# Patient Record
Sex: MALE | Race: WHITE | NOT HISPANIC OR LATINO | URBAN - METROPOLITAN AREA
[De-identification: names, ages, dates, MRNs, and addresses within clinical notes are randomized per-mention and may not be internally consistent; named-entity substitution may affect disease eponyms.]

---

## 2017-06-05 ENCOUNTER — OUTPATIENT (OUTPATIENT)
Dept: OUTPATIENT SERVICES | Facility: HOSPITAL | Age: 67
LOS: 1 days | Discharge: HOME | End: 2017-06-05

## 2017-06-28 DIAGNOSIS — R06.02 SHORTNESS OF BREATH: ICD-10-CM

## 2017-06-28 DIAGNOSIS — I25.10 ATHEROSCLEROTIC HEART DISEASE OF NATIVE CORONARY ARTERY WITHOUT ANGINA PECTORIS: ICD-10-CM

## 2017-07-19 ENCOUNTER — OUTPATIENT (OUTPATIENT)
Dept: OUTPATIENT SERVICES | Facility: HOSPITAL | Age: 67
LOS: 1 days | Discharge: HOME | End: 2017-07-19

## 2017-07-26 DIAGNOSIS — I25.10 ATHEROSCLEROTIC HEART DISEASE OF NATIVE CORONARY ARTERY WITHOUT ANGINA PECTORIS: ICD-10-CM

## 2017-07-26 DIAGNOSIS — R94.39 ABNORMAL RESULT OF OTHER CARDIOVASCULAR FUNCTION STUDY: ICD-10-CM

## 2017-07-26 DIAGNOSIS — I10 ESSENTIAL (PRIMARY) HYPERTENSION: ICD-10-CM

## 2019-04-15 ENCOUNTER — INPATIENT (INPATIENT)
Facility: HOSPITAL | Age: 69
LOS: 6 days | Discharge: HOME | End: 2019-04-22
Attending: SURGERY | Admitting: SURGERY
Payer: MEDICARE

## 2019-04-15 VITALS
RESPIRATION RATE: 20 BRPM | SYSTOLIC BLOOD PRESSURE: 96 MMHG | DIASTOLIC BLOOD PRESSURE: 53 MMHG | HEART RATE: 126 BPM | TEMPERATURE: 98 F | OXYGEN SATURATION: 98 %

## 2019-04-15 DIAGNOSIS — Z98.890 OTHER SPECIFIED POSTPROCEDURAL STATES: Chronic | ICD-10-CM

## 2019-04-15 LAB
ALBUMIN SERPL ELPH-MCNC: 4.5 G/DL — SIGNIFICANT CHANGE UP (ref 3.5–5.2)
ALP SERPL-CCNC: 95 U/L — SIGNIFICANT CHANGE UP (ref 30–115)
ALT FLD-CCNC: 12 U/L — SIGNIFICANT CHANGE UP (ref 0–41)
ANION GAP SERPL CALC-SCNC: 18 MMOL/L — HIGH (ref 7–14)
ANION GAP SERPL CALC-SCNC: 26 MMOL/L — HIGH (ref 7–14)
APTT BLD: 25.3 SEC — LOW (ref 27–39.2)
AST SERPL-CCNC: 13 U/L — SIGNIFICANT CHANGE UP (ref 0–41)
BASE EXCESS BLDV CALC-SCNC: 8.5 MMOL/L — HIGH (ref -2–2)
BASOPHILS # BLD AUTO: 0.01 K/UL — SIGNIFICANT CHANGE UP (ref 0–0.2)
BASOPHILS # BLD AUTO: 0.05 K/UL — SIGNIFICANT CHANGE UP (ref 0–0.2)
BASOPHILS NFR BLD AUTO: 0.1 % — SIGNIFICANT CHANGE UP (ref 0–1)
BASOPHILS NFR BLD AUTO: 0.2 % — SIGNIFICANT CHANGE UP (ref 0–1)
BILIRUB SERPL-MCNC: 0.9 MG/DL — SIGNIFICANT CHANGE UP (ref 0.2–1.2)
BLD GP AB SCN SERPL QL: SIGNIFICANT CHANGE UP
BUN SERPL-MCNC: 66 MG/DL — CRITICAL HIGH (ref 10–20)
BUN SERPL-MCNC: 71 MG/DL — CRITICAL HIGH (ref 10–20)
CA-I SERPL-SCNC: 1.03 MMOL/L — LOW (ref 1.12–1.3)
CALCIUM SERPL-MCNC: 8.2 MG/DL — LOW (ref 8.5–10.1)
CALCIUM SERPL-MCNC: 9.7 MG/DL — SIGNIFICANT CHANGE UP (ref 8.5–10.1)
CHLORIDE SERPL-SCNC: 83 MMOL/L — LOW (ref 98–110)
CHLORIDE SERPL-SCNC: 91 MMOL/L — LOW (ref 98–110)
CO2 SERPL-SCNC: 29 MMOL/L — SIGNIFICANT CHANGE UP (ref 17–32)
CO2 SERPL-SCNC: 29 MMOL/L — SIGNIFICANT CHANGE UP (ref 17–32)
CREAT SERPL-MCNC: 5.3 MG/DL — CRITICAL HIGH (ref 0.7–1.5)
CREAT SERPL-MCNC: 7 MG/DL — CRITICAL HIGH (ref 0.7–1.5)
EOSINOPHIL # BLD AUTO: 0.03 K/UL — SIGNIFICANT CHANGE UP (ref 0–0.7)
EOSINOPHIL # BLD AUTO: 0.07 K/UL — SIGNIFICANT CHANGE UP (ref 0–0.7)
EOSINOPHIL NFR BLD AUTO: 0.1 % — SIGNIFICANT CHANGE UP (ref 0–8)
EOSINOPHIL NFR BLD AUTO: 0.4 % — SIGNIFICANT CHANGE UP (ref 0–8)
GAS PNL BLDV: 137 MMOL/L — SIGNIFICANT CHANGE UP (ref 136–145)
GAS PNL BLDV: SIGNIFICANT CHANGE UP
GLUCOSE SERPL-MCNC: 109 MG/DL — HIGH (ref 70–99)
GLUCOSE SERPL-MCNC: 135 MG/DL — HIGH (ref 70–99)
HCO3 BLDV-SCNC: 34 MMOL/L — HIGH (ref 22–29)
HCT VFR BLD CALC: 43.3 % — SIGNIFICANT CHANGE UP (ref 42–52)
HCT VFR BLD CALC: 54.3 % — HIGH (ref 42–52)
HCT VFR BLDA CALC: 54.2 % — HIGH (ref 34–44)
HGB BLD CALC-MCNC: 17.7 G/DL — SIGNIFICANT CHANGE UP (ref 14–18)
HGB BLD-MCNC: 13.9 G/DL — LOW (ref 14–18)
HGB BLD-MCNC: 17.5 G/DL — SIGNIFICANT CHANGE UP (ref 14–18)
IMM GRANULOCYTES NFR BLD AUTO: 0.7 % — HIGH (ref 0.1–0.3)
IMM GRANULOCYTES NFR BLD AUTO: 0.8 % — HIGH (ref 0.1–0.3)
INR BLD: 1.09 RATIO — SIGNIFICANT CHANGE UP (ref 0.65–1.3)
LACTATE BLDV-MCNC: 2.5 MMOL/L — HIGH (ref 0.5–1.6)
LACTATE SERPL-SCNC: 2.8 MMOL/L — HIGH (ref 0.5–2.2)
LIDOCAIN IGE QN: 19 U/L — SIGNIFICANT CHANGE UP (ref 7–60)
LYMPHOCYTES # BLD AUTO: 1.3 K/UL — SIGNIFICANT CHANGE UP (ref 1.2–3.4)
LYMPHOCYTES # BLD AUTO: 1.55 K/UL — SIGNIFICANT CHANGE UP (ref 1.2–3.4)
LYMPHOCYTES # BLD AUTO: 5.7 % — LOW (ref 20.5–51.1)
LYMPHOCYTES # BLD AUTO: 8.9 % — LOW (ref 20.5–51.1)
MAGNESIUM SERPL-MCNC: 2.1 MG/DL — SIGNIFICANT CHANGE UP (ref 1.8–2.4)
MAGNESIUM SERPL-MCNC: 2.5 MG/DL — HIGH (ref 1.8–2.4)
MCHC RBC-ENTMCNC: 26.1 PG — LOW (ref 27–31)
MCHC RBC-ENTMCNC: 26.3 PG — LOW (ref 27–31)
MCHC RBC-ENTMCNC: 32.1 G/DL — SIGNIFICANT CHANGE UP (ref 32–37)
MCHC RBC-ENTMCNC: 32.2 G/DL — SIGNIFICANT CHANGE UP (ref 32–37)
MCV RBC AUTO: 81 FL — SIGNIFICANT CHANGE UP (ref 80–94)
MCV RBC AUTO: 82 FL — SIGNIFICANT CHANGE UP (ref 80–94)
MONOCYTES # BLD AUTO: 1.67 K/UL — HIGH (ref 0.1–0.6)
MONOCYTES # BLD AUTO: 1.94 K/UL — HIGH (ref 0.1–0.6)
MONOCYTES NFR BLD AUTO: 8.5 % — SIGNIFICANT CHANGE UP (ref 1.7–9.3)
MONOCYTES NFR BLD AUTO: 9.6 % — HIGH (ref 1.7–9.3)
NEUTROPHILS # BLD AUTO: 13.88 K/UL — HIGH (ref 1.4–6.5)
NEUTROPHILS # BLD AUTO: 19.32 K/UL — HIGH (ref 1.4–6.5)
NEUTROPHILS NFR BLD AUTO: 80.2 % — HIGH (ref 42.2–75.2)
NEUTROPHILS NFR BLD AUTO: 84.8 % — HIGH (ref 42.2–75.2)
NRBC # BLD: 0 /100 WBCS — SIGNIFICANT CHANGE UP (ref 0–0)
NRBC # BLD: 0 /100 WBCS — SIGNIFICANT CHANGE UP (ref 0–0)
PCO2 BLDV: 46 MMHG — SIGNIFICANT CHANGE UP (ref 41–51)
PH BLDV: 7.48 — HIGH (ref 7.26–7.43)
PHOSPHATE SERPL-MCNC: 6 MG/DL — HIGH (ref 2.1–4.9)
PLATELET # BLD AUTO: 196 K/UL — SIGNIFICANT CHANGE UP (ref 130–400)
PLATELET # BLD AUTO: 309 K/UL — SIGNIFICANT CHANGE UP (ref 130–400)
PO2 BLDV: 38 MMHG — SIGNIFICANT CHANGE UP (ref 20–40)
POTASSIUM BLDV-SCNC: 4.2 MMOL/L — SIGNIFICANT CHANGE UP (ref 3.3–5.6)
POTASSIUM SERPL-MCNC: 4.3 MMOL/L — SIGNIFICANT CHANGE UP (ref 3.5–5)
POTASSIUM SERPL-MCNC: 4.6 MMOL/L — SIGNIFICANT CHANGE UP (ref 3.5–5)
POTASSIUM SERPL-SCNC: 4.3 MMOL/L — SIGNIFICANT CHANGE UP (ref 3.5–5)
POTASSIUM SERPL-SCNC: 4.6 MMOL/L — SIGNIFICANT CHANGE UP (ref 3.5–5)
PROT SERPL-MCNC: 8.1 G/DL — HIGH (ref 6–8)
PROTHROM AB SERPL-ACNC: 12.5 SEC — SIGNIFICANT CHANGE UP (ref 9.95–12.87)
RBC # BLD: 5.28 M/UL — SIGNIFICANT CHANGE UP (ref 4.7–6.1)
RBC # BLD: 6.7 M/UL — HIGH (ref 4.7–6.1)
RBC # FLD: 15 % — HIGH (ref 11.5–14.5)
RBC # FLD: 16.4 % — HIGH (ref 11.5–14.5)
SAO2 % BLDV: 71 % — SIGNIFICANT CHANGE UP
SODIUM SERPL-SCNC: 138 MMOL/L — SIGNIFICANT CHANGE UP (ref 135–146)
SODIUM SERPL-SCNC: 138 MMOL/L — SIGNIFICANT CHANGE UP (ref 135–146)
TROPONIN T SERPL-MCNC: <0.01 NG/ML — SIGNIFICANT CHANGE UP
TYPE + AB SCN PNL BLD: SIGNIFICANT CHANGE UP
WBC # BLD: 17.32 K/UL — HIGH (ref 4.8–10.8)
WBC # BLD: 22.8 K/UL — HIGH (ref 4.8–10.8)
WBC # FLD AUTO: 17.32 K/UL — HIGH (ref 4.8–10.8)
WBC # FLD AUTO: 22.8 K/UL — HIGH (ref 4.8–10.8)

## 2019-04-15 PROCEDURE — 99291 CRITICAL CARE FIRST HOUR: CPT

## 2019-04-15 PROCEDURE — 71045 X-RAY EXAM CHEST 1 VIEW: CPT | Mod: 26,77

## 2019-04-15 PROCEDURE — 99285 EMERGENCY DEPT VISIT HI MDM: CPT | Mod: AI

## 2019-04-15 PROCEDURE — 74176 CT ABD & PELVIS W/O CONTRAST: CPT | Mod: 26

## 2019-04-15 PROCEDURE — 71045 X-RAY EXAM CHEST 1 VIEW: CPT | Mod: 26

## 2019-04-15 PROCEDURE — 93010 ELECTROCARDIOGRAM REPORT: CPT

## 2019-04-15 RX ORDER — KETOROLAC TROMETHAMINE 30 MG/ML
15 SYRINGE (ML) INJECTION EVERY 6 HOURS
Qty: 0 | Refills: 0 | Status: DISCONTINUED | OUTPATIENT
Start: 2019-04-15 | End: 2019-04-15

## 2019-04-15 RX ORDER — SODIUM CHLORIDE 9 MG/ML
500 INJECTION, SOLUTION INTRAVENOUS ONCE
Qty: 0 | Refills: 0 | Status: COMPLETED | OUTPATIENT
Start: 2019-04-15 | End: 2019-04-15

## 2019-04-15 RX ORDER — HYDROMORPHONE HYDROCHLORIDE 2 MG/ML
0.5 INJECTION INTRAMUSCULAR; INTRAVENOUS; SUBCUTANEOUS EVERY 6 HOURS
Qty: 0 | Refills: 0 | Status: DISCONTINUED | OUTPATIENT
Start: 2019-04-15 | End: 2019-04-16

## 2019-04-15 RX ORDER — SODIUM CHLORIDE 9 MG/ML
1000 INJECTION, SOLUTION INTRAVENOUS
Qty: 0 | Refills: 0 | Status: DISCONTINUED | OUTPATIENT
Start: 2019-04-15 | End: 2019-04-15

## 2019-04-15 RX ORDER — HEPARIN SODIUM 5000 [USP'U]/ML
5000 INJECTION INTRAVENOUS; SUBCUTANEOUS EVERY 8 HOURS
Qty: 0 | Refills: 0 | Status: DISCONTINUED | OUTPATIENT
Start: 2019-04-15 | End: 2019-04-19

## 2019-04-15 RX ORDER — SODIUM CHLORIDE 9 MG/ML
1000 INJECTION INTRAMUSCULAR; INTRAVENOUS; SUBCUTANEOUS
Qty: 0 | Refills: 0 | Status: DISCONTINUED | OUTPATIENT
Start: 2019-04-15 | End: 2019-04-15

## 2019-04-15 RX ORDER — MORPHINE SULFATE 50 MG/1
2 CAPSULE, EXTENDED RELEASE ORAL EVERY 4 HOURS
Qty: 0 | Refills: 0 | Status: DISCONTINUED | OUTPATIENT
Start: 2019-04-15 | End: 2019-04-15

## 2019-04-15 RX ORDER — SODIUM CHLORIDE 9 MG/ML
1000 INJECTION, SOLUTION INTRAVENOUS
Qty: 0 | Refills: 0 | Status: DISCONTINUED | OUTPATIENT
Start: 2019-04-15 | End: 2019-04-16

## 2019-04-15 RX ORDER — SODIUM CHLORIDE 9 MG/ML
1000 INJECTION, SOLUTION INTRAVENOUS ONCE
Qty: 0 | Refills: 0 | Status: COMPLETED | OUTPATIENT
Start: 2019-04-15 | End: 2019-04-15

## 2019-04-15 RX ORDER — CHLORHEXIDINE GLUCONATE 213 G/1000ML
1 SOLUTION TOPICAL
Qty: 0 | Refills: 0 | Status: DISCONTINUED | OUTPATIENT
Start: 2019-04-15 | End: 2019-04-19

## 2019-04-15 RX ORDER — IOHEXOL 300 MG/ML
30 INJECTION, SOLUTION INTRAVENOUS ONCE
Qty: 0 | Refills: 0 | Status: COMPLETED | OUTPATIENT
Start: 2019-04-15 | End: 2019-04-15

## 2019-04-15 RX ORDER — PANTOPRAZOLE SODIUM 20 MG/1
40 TABLET, DELAYED RELEASE ORAL DAILY
Qty: 0 | Refills: 0 | Status: DISCONTINUED | OUTPATIENT
Start: 2019-04-15 | End: 2019-04-19

## 2019-04-15 RX ORDER — SODIUM CHLORIDE 9 MG/ML
1000 INJECTION INTRAMUSCULAR; INTRAVENOUS; SUBCUTANEOUS ONCE
Qty: 0 | Refills: 0 | Status: COMPLETED | OUTPATIENT
Start: 2019-04-15 | End: 2019-04-15

## 2019-04-15 RX ORDER — CEFOTETAN DISODIUM 1 G
2 VIAL (EA) INJECTION ONCE
Qty: 0 | Refills: 0 | Status: COMPLETED | OUTPATIENT
Start: 2019-04-15 | End: 2019-04-15

## 2019-04-15 RX ADMIN — Medication 100 GRAM(S): at 19:07

## 2019-04-15 RX ADMIN — IOHEXOL 30 MILLILITER(S): 300 INJECTION, SOLUTION INTRAVENOUS at 14:05

## 2019-04-15 RX ADMIN — PANTOPRAZOLE SODIUM 40 MILLIGRAM(S): 20 TABLET, DELAYED RELEASE ORAL at 23:08

## 2019-04-15 RX ADMIN — SODIUM CHLORIDE 1000 MILLILITER(S): 9 INJECTION, SOLUTION INTRAVENOUS at 15:35

## 2019-04-15 RX ADMIN — SODIUM CHLORIDE 500 MILLILITER(S): 9 INJECTION, SOLUTION INTRAVENOUS at 18:12

## 2019-04-15 RX ADMIN — SODIUM CHLORIDE 1000 MILLILITER(S): 9 INJECTION, SOLUTION INTRAVENOUS at 22:21

## 2019-04-15 RX ADMIN — HEPARIN SODIUM 5000 UNIT(S): 5000 INJECTION INTRAVENOUS; SUBCUTANEOUS at 23:08

## 2019-04-15 RX ADMIN — SODIUM CHLORIDE 1000 MILLILITER(S): 9 INJECTION, SOLUTION INTRAVENOUS at 23:04

## 2019-04-15 RX ADMIN — SODIUM CHLORIDE 2000 MILLILITER(S): 9 INJECTION INTRAMUSCULAR; INTRAVENOUS; SUBCUTANEOUS at 13:10

## 2019-04-15 NOTE — H&P ADULT - HISTORY OF PRESENT ILLNESS
HPI:    68M PMH HTN, DLD, GERD, PSH panc Ca s/p whipple procedure 2010 presents to the ED c/o 5 DAYS OF n/v and abd pain. Pt states the abdominal pain is focal, located to the mid abdomen, and is mild. Nonradiating. Pt states that he maintained a liquid diet for the past 5 days but had green emesis throughout. Pt states that he has had this hernia since his surg in '10 and he can usually "move it and put it back in" and never reports it being "stuck" until now. Pt says he was passing gas until this AM.     Last BM 2 days ago. Last c-scope 3y ago, last CT scan in Nov 18, both normal. Pt describes intermittent episodes of hematochezia over the past few months which were worked up by [place] [facility] [place]; negative EGD, healing on capsule endoscopy, pt does not know exact location. Pt denies hematochezia and hematemesis at this time, along w/ fever, chills, chest pain. HPI:    68M PMH HTN, DLD, GERD, PSH panc Ca s/p whipple procedure 2010 presents to the ED c/o 5 DAYS OF n/v and abd pain. Pt states the abdominal pain is focal, located to the mid abdomen, and is mild. Nonradiating. Pt states that he maintained a liquid diet for the past 5 days but had green emesis throughout. Pt states that he has had this hernia since his surg in '10 and he can usually "move it and put it back in" and never reports it being "stuck" until now. Pt does, however, describe an episode of similar abdominal pain and "dry heaving" in Feb of this year.     Pt says he was passing gas until this AM. Last BM 2 days ago. Last c-scope 3y ago, last CT scan in Nov 18, both normal. Pt describes intermittent episodes of hematochezia over the past few months which were worked up by his physicians at [facility] [place]; negative EGD, healing on capsule endoscopy, pt does not know exact location. Pt denies hematochezia and hematemesis at this time, along w/ fever, chills, chest pain.

## 2019-04-15 NOTE — ED PROVIDER NOTE - OBJECTIVE STATEMENT
67 Y/O M HTN, DLD, GERD, PANCREATIC CA S/P WHIPPLE PROCEDURE 2010, C/O 5 DAYS OF N/V AND ABD PAIN. ABD PAIN IS LOWER ABD AND IS CONSTANT AND NONRADIATING. PT ONLY ABLE TO TOLERATE LIQUIDS X 4-5 DAYS. LAST BM 2 DAYS AGO WAS NORMAL. NO FEVER, CHILLS. NO HEMATEMESIS. PT WITH 2 SIMILAR EPISODES OVER THE PAST 3 MONTHS AND PT HAD AN EGD WITH FIRST EPISODE WHICH WAS REPORTEDLY NORMAL. NORMAL URINATION. NO BACK PAIN. NO CP, SOB, COUGH.

## 2019-04-15 NOTE — CONSULT NOTE ADULT - SUBJECTIVE AND OBJECTIVE BOX
EVELIO LLAMAS  [id number]  68y Male with pertinent pmhx/pshx of pancreatic cancer s/p whipple 2010 presents with cc of mid abd pain x5 days PTA. He also reports vomiting "green" for the past 5 days and a "bulge" in his mid abdomen since his whipple procedure which he is usually able to reduce on his own but has been unsuccessful lately.  Other pmhx as stated below     On arrival to ED:  - BP stable, + tachycardia  - Hernia reduced by Surgical resident  - Slight diffuse TTP on exam, - peritoneal signs  - NGT placed with copious billious op  -WBC 22, Cr 7 (no hx of CKD, baseline normal), venous lactate 2.5, afebrile  - CT abdomen: Moderate gastric distention and multiple dilated loops of small bowel   extending to the level of a moderate size paraumbilical hernia containing   a short segment of thickened small bowel, compatible with small bowel   obstruction    Patient given 4L of IVF with plans for OR tonight/vladimir    SICU consulted for close monitoring in the setting of SBO, new onset DAISY    Admit Date: 4/15  ICU Date: 4/15  OR Date: ?    No Known Allergies    PAST MEDICAL & SURGICAL HISTORY:  HLD (hyperlipidemia)  HTN (hypertension)  History of pancreatic surgery    Home Medications:    DVT PTX:  Hep SQ    GI PTX: PPI     ***Tubes/Lines/Drains  ***  Peripheral IV  Hathaway      REVIEW OF SYSTEMS    [x ] A ten-point review of systems was otherwise negative except as noted.  [ ] Due to altered mental status/intubation, subjective information were not able to be obtained from the patient. History was obtained, to the extent possible, from review of the chart and collateral sources of information.    Daily     Daily     Diet, NPO (04-15-19 @ 20:21)      CURRENT MEDS:  Neurologic Medications  morphine  - Injectable 2 milliGRAM(s) IV Push every 4 hours PRN Moderate Pain (4 - 6)  Gastrointestinal Medications  lactated ringers. 1000 milliLiter(s) IV Continuous <Continuous>  pantoprazole  Injectable 40 milliGRAM(s) IV Push daily  Hematologic/Oncologic Medications  heparin  Injectable 5000 Unit(s) SubCutaneous every 8 hours  Topical/Other Medications  chlorhexidine 4% Liquid 1 Application(s) Topical <User Schedule>      PHYSICAL EXAM:    General/Neuro  Alert, oriented  Lungs:      clear to auscultation, Normal expansion/effort.     Cardiovascular : S1, S2. +tachycardia   Cardiac Rhythm: Normal Sinus Rhythm    GI: Abdomen soft, non distended, slight diffuse TTP, (-) peritoneal, no hernia appreciated on exam, well-healed midline laparotomy scar.    Extremities: Extremities warm, pink, well-perfused.     Derm: Good skin turgor, no skin breakdown.      :   Hathaway catheter in place.      CXR:     LABS:  CAPILLARY BLOOD GLUCOSE                              13.9   17.32 )-----------( 196      ( 15 Apr 2019 22:50 )             43.3       04-15    138  |  83<L>  |  66<HH>  ----------------------------<  135<H>  4.6   |  29  |  7.0<HH>    Ca    9.7      15 Apr 2019 13:10  Mg     2.5     04-15    TPro  8.1<H>  /  Alb  4.5  /  TBili  0.9  /  DBili  x   /  AST  13  /  ALT  12  /  AlkPhos  95  04-15      PT/INR - ( 15 Apr 2019 19:45 )   PT: 12.50 sec;   INR: 1.09 ratio         PTT - ( 15 Apr 2019 19:45 )  PTT:25.3 sec  CARDIAC MARKERS ( 15 Apr 2019 13:10 )  x     / <0.01 ng/mL / x     / x     / x EVELIO LLAMAS  [id number]  68y Male with pertinent pmhx/pshx of pancreatic cancer s/p whipple 2010 presents with cc of mid abd pain x5 days PTA. He also reports vomiting "green" for the past 5 days and a "bulge" in his mid abdomen since his whipple procedure which he is usually able to reduce on his own but has been unsuccessful lately. Last BM 2 days PTA ,passing gas.    Of note, patient also reports hx of previous GI bleed, but is a poor historian and is unable at providing details. Other pmhx as stated below     On arrival to ED:  - BP stable, + tachycardia  - Hernia reduced by Surgical resident  - Slight diffuse TTP on exam, - peritoneal signs  - NGT placed with copious billious op  -WBC 22, Cr 7 (no hx of CKD, baseline normal), venous lactate 2.5, afebrile  - CT abdomen: Moderate gastric distention and multiple dilated loops of small bowel   extending to the level of a moderate size paraumbilical hernia containing   a short segment of thickened small bowel, compatible with small bowel   obstruction    Patient given 4L of IVF with plans for OR tonight/vladimir    SICU consulted for close monitoring in the setting of SBO, new onset DAISY    Admit Date: 4/15  ICU Date: 4/15  OR Date: ?    No Known Allergies    PAST MEDICAL & SURGICAL HISTORY:  HLD (hyperlipidemia)  HTN (hypertension)  History of pancreatic surgery    Home Medications:    DVT PTX:  Hep SQ    GI PTX: PPI     ***Tubes/Lines/Drains  ***  Peripheral IV  Hathaway      REVIEW OF SYSTEMS    [x ] A ten-point review of systems was otherwise negative except as noted.  [ ] Due to altered mental status/intubation, subjective information were not able to be obtained from the patient. History was obtained, to the extent possible, from review of the chart and collateral sources of information.    Daily     Daily     Diet, NPO (04-15-19 @ 20:21)      CURRENT MEDS:  Neurologic Medications  morphine  - Injectable 2 milliGRAM(s) IV Push every 4 hours PRN Moderate Pain (4 - 6)  Gastrointestinal Medications  lactated ringers. 1000 milliLiter(s) IV Continuous <Continuous>  pantoprazole  Injectable 40 milliGRAM(s) IV Push daily  Hematologic/Oncologic Medications  heparin  Injectable 5000 Unit(s) SubCutaneous every 8 hours  Topical/Other Medications  chlorhexidine 4% Liquid 1 Application(s) Topical <User Schedule>      PHYSICAL EXAM:    General/Neuro  Alert, oriented  Lungs:      clear to auscultation, Normal expansion/effort.     Cardiovascular : S1, S2. +tachycardia   Cardiac Rhythm: Normal Sinus Rhythm    GI: Abdomen soft, non distended, slight diffuse TTP, (-) peritoneal, no hernia appreciated on exam, well-healed midline laparotomy scar.    Extremities: Extremities warm, pink, well-perfused.     Derm: Good skin turgor, no skin breakdown.      :   Hathaway catheter in place.      CXR:     LABS:  CAPILLARY BLOOD GLUCOSE                              13.9   17.32 )-----------( 196      ( 15 Apr 2019 22:50 )             43.3       04-15    138  |  83<L>  |  66<HH>  ----------------------------<  135<H>  4.6   |  29  |  7.0<HH>    Ca    9.7      15 Apr 2019 13:10  Mg     2.5     04-15    TPro  8.1<H>  /  Alb  4.5  /  TBili  0.9  /  DBili  x   /  AST  13  /  ALT  12  /  AlkPhos  95  04-15      PT/INR - ( 15 Apr 2019 19:45 )   PT: 12.50 sec;   INR: 1.09 ratio         PTT - ( 15 Apr 2019 19:45 )  PTT:25.3 sec  CARDIAC MARKERS ( 15 Apr 2019 13:10 )  x     / <0.01 ng/mL / x     / x     / x EVELIO LLAMAS  [id number]  68y Male with pertinent pmhx/pshx of pancreatic cancer s/p whipple 2010 presents with cc of mid abd pain x5 days PTA. He also reports vomiting "green" for the past 5 days and a "bulge" in his mid abdomen since his whipple procedure which he is usually able to reduce on his own but has been unsuccessful lately. Last BM 2 days PTA ,passing gas.    Of note, patient also reports hx of previous GI bleed, but is a poor historian and is unable at providing details. Other pmhx as stated below     On arrival to ED:  - BP stable, + tachycardia  - Hernia reduced by Surgical resident  - Slight diffuse TTP on exam, - peritoneal signs  - NGT placed with copious billious op  -WBC 22, Cr 7 (no hx of CKD, baseline normal), venous lactate 2.5, afebrile  - CT abdomen: Moderate gastric distention and multiple dilated loops of small bowel   extending to the level of a moderate size paraumbilical hernia containing   a short segment of thickened small bowel, compatible with small bowel   obstruction    Patient given 4L of IVF with plans for OR tonight/vladimir    SICU consulted for close monitoring in the setting of SBO likely 2/2 partially reducible hernia, new onset DAISY    Admit Date: 4/15  ICU Date: 4/15  OR Date: ?    No Known Allergies    PAST MEDICAL & SURGICAL HISTORY:  HLD (hyperlipidemia)  HTN (hypertension)  History of pancreatic surgery    Home Medications:    DVT PTX:  Hep SQ    GI PTX: PPI     ***Tubes/Lines/Drains  ***  Peripheral IV  Hathaway      REVIEW OF SYSTEMS    [x ] A ten-point review of systems was otherwise negative except as noted.  [ ] Due to altered mental status/intubation, subjective information were not able to be obtained from the patient. History was obtained, to the extent possible, from review of the chart and collateral sources of information.    Daily     Daily     Diet, NPO (04-15-19 @ 20:21)      CURRENT MEDS:  Neurologic Medications  morphine  - Injectable 2 milliGRAM(s) IV Push every 4 hours PRN Moderate Pain (4 - 6)  Gastrointestinal Medications  lactated ringers. 1000 milliLiter(s) IV Continuous <Continuous>  pantoprazole  Injectable 40 milliGRAM(s) IV Push daily  Hematologic/Oncologic Medications  heparin  Injectable 5000 Unit(s) SubCutaneous every 8 hours  Topical/Other Medications  chlorhexidine 4% Liquid 1 Application(s) Topical <User Schedule>      PHYSICAL EXAM:    General/Neuro  Alert, oriented  Lungs:      clear to auscultation, Normal expansion/effort.     Cardiovascular : S1, S2. +tachycardia   Cardiac Rhythm: Normal Sinus Rhythm    GI: Abdomen soft, non distended, slight diffuse TTP, (-) peritoneal, no hernia appreciated on exam, well-healed midline laparotomy scar.    Extremities: Extremities warm, pink, well-perfused.     Derm: Good skin turgor, no skin breakdown.      :   Hathaway catheter in place.      CXR:     LABS:  CAPILLARY BLOOD GLUCOSE                              13.9   17.32 )-----------( 196      ( 15 Apr 2019 22:50 )             43.3       04-15    138  |  83<L>  |  66<HH>  ----------------------------<  135<H>  4.6   |  29  |  7.0<HH>    Ca    9.7      15 Apr 2019 13:10  Mg     2.5     04-15    TPro  8.1<H>  /  Alb  4.5  /  TBili  0.9  /  DBili  x   /  AST  13  /  ALT  12  /  AlkPhos  95  04-15      PT/INR - ( 15 Apr 2019 19:45 )   PT: 12.50 sec;   INR: 1.09 ratio         PTT - ( 15 Apr 2019 19:45 )  PTT:25.3 sec  CARDIAC MARKERS ( 15 Apr 2019 13:10 )  x     / <0.01 ng/mL / x     / x     / x

## 2019-04-15 NOTE — ED PROVIDER NOTE - NS ED ROS FT
Constitutional:  See HPI.  Eyes:  No visual changes, eye pain or discharge.  ENMT:  No hearing changes, pain, discharge or infections. No neck pain or stiffness.  Cardiac:  No chest pain, SOB or edema. No chest pain with exertion.  Respiratory:  No cough or respiratory distress. No hemoptysis. No history of asthma or RAD.  GI:  see hpi  :  No dysuria, frequency or burning.  MS:  No myalgia, muscle weakness, joint pain or back pain.  Neuro:  No headache or weakness.  No LOC.  Skin:  No skin rash.   Endocrine: No history of thyroid disease or diabetes.  Except as documented in the HPI,  all other systems are negative.

## 2019-04-15 NOTE — ED PROVIDER NOTE - CLINICAL SUMMARY MEDICAL DECISION MAKING FREE TEXT BOX
67 Y/O M PMHX AS DOCUMENTED C/O 5 DAYS OF ABD PAIN, N/V. LABS WITH LEUKOCYTOSIS AND DAISY/DEHYDRATION. CT SCVAN WITH HERNIA/SBO. SURGERY AND NEPHROLOGY CONSULTED. IVFS GIVEN. ABX GIVEN. HUNT PLACED. PT ADMITTED TO SICU.

## 2019-04-15 NOTE — H&P ADULT - ASSESSMENT
Assessment:  68y Male patient admitted w/ incarcerated incisional hernia causing SBO    Plan:    NPO  IVF  ABX  NGT  Preop for OR  Please obtain Type & Screen, coags  DVT/GI ppx  OOBAT  IS  Pain control    Date/Time: 04-15-19 @ 19:14 Assessment:  68y Male patient admitted w/ incarcerated incisional hernia causing SBO    Plan:    Admit to SICU for optimization  NPO  IVF  ABX  NGT  Preop for OR  Please obtain Type & Screen, coags  DVT/GI ppx  OOBAT  IS  Pain control    Date/Time: 04-15-19 @ 19:14 Assessment:  68y Male patient admitted w/ incarcerated incisional hernia causing SBO    Plan:    Admit to SICU for optimization  NPO  IVF  ABX  NGT  Preop for OR  Please obtain Type & Screen, coags  DVT/GI ppx  OOBAT  IS  Pain control  Senior resident on call note : patient was seen and examined. Discussed the case with Dr. Gaspar and he agrees with the plan:  69 y/o m with SBO : secondary to partial recuabale w/o peritoneal sign inc ional hernia p/w abd pain/n/v for 4 days , wbc 21 , exam : reducible  hernia,passig gas , had bm 3 days ago .   LA : 2.5 , emi: cre 7 , bu 66.  - agree on above plan : - serial exam/ICU/ repeat labs q 4 hrs .   - plan for OR after optimizing  renal function or if exam changes.  - npo/ngt to LCS/Hathaway:strict is and os s  - scd/hsq/iv abx       Date/Time: 04-15-19 @ 19:14

## 2019-04-15 NOTE — ED ADULT NURSE REASSESSMENT NOTE - NS ED NURSE REASSESS COMMENT FT1
Pt reassessed A/O times 4 Vs retaken stable CT of abdomen order is done report filing slight better ED attending made aware on going nursing observation. .

## 2019-04-15 NOTE — CONSULT NOTE ADULT - SUBJECTIVE AND OBJECTIVE BOX
NEPHROLOGY CONSULTATION NOTE    Patient is a 69 Y/O M with pmh HTN, DLD, GERD, PANCREATIC CA S/P WHIPPLE PROCEDURE in 2010  presented to hospital with N/V and lower Abd Pain for 5 days. Abd pain is constant and non-radiating. Patient also mentions decreased oral intake, decreased urine output and few episodes of diarrhea and very little amount of blood in vomit. Pt denies any chest pain, SOB, fever, chills, dizziness. Pt had 2 similar episodes in last 2 months.    Patient takes valsartan and amlodipine for BP.      PAST MEDICAL & SURGICAL HISTORY:  HTN (hypertension)    Allergies:  No Known Allergies    Home Medications:    Hospital Medications:   MEDICATIONS  (STANDING):      SOCIAL HISTORY:  Denies ETOH,Smoking,   FAMILY HISTORY:        REVIEW OF SYSTEMS:    All other review of systems is negative unless indicated above.    VITALS:  T(F): 97.4 (04-15-19 @ 15:34), Max: 99.4 (04-15-19 @ 13:24)  HR: 97 (04-15-19 @ 15:34)  BP: 108/89 (04-15-19 @ 15:34)  RR: 20 (04-15-19 @ 15:34)  SpO2: 96% (04-15-19 @ 15:34)        I&O's Detail        PHYSICAL EXAM:  Constitutional: NAD  Respiratory: CTAB, no wheezes, rales or rhonchi  Cardiovascular: S1, S2, RRR  Gastrointestinal: BS+, soft, NT/ND  Extremities: No peripheral edema  Neurological: A/O x 3  : + spencer.     Vascular Access:    LABS:  04-15    138  |  83<L>  |  66<HH>  ----------------------------<  135<H>  4.6   |  29  |  7.0<HH>    Ca    9.7      15 Apr 2019 13:10  Mg     2.5     04-15    TPro  8.1<H>  /  Alb  4.5  /  TBili  0.9  /  DBili      /  AST  13  /  ALT  12  /  AlkPhos  95  04-15    Creatinine Trend: 7.0 <--                        17.5   22.80 )-----------( 309      ( 15 Apr 2019 13:10 )             54.3     Troponin T, Serum: <0.01 ng/mL (04.15.19 @ 13:10)    Blood Gas Venous - Lactate: 2.5 mmoL/L (04.15.19 @ 13:11)  Blood Gas Profile - Venous (04.15.19 @ 13:11)    pH, Venous: 7.48    pCO2, Venous: 46 mmHg    pO2, Venous: 38 mmHg    HCO3, Venous: 34 mmoL/L    Base Excess, Venous: 8.5 mmoL/L    Oxygen Saturation, Venous: 71 %      < from: 12 Lead ECG (04.15.19 @ 13:21) >  Diagnosis Line Sinus tachycardia with Premature atrial complexes  Possible Left atrial enlargement  Borderline ECG    < end of copied text >    Urine Studies:        RADIOLOGY & ADDITIONAL STUDIES:

## 2019-04-15 NOTE — CONSULT NOTE ADULT - ASSESSMENT
Patient with DAISY presented to hospital for n/v, abdominal pain, decreased oral intake, decreased uo.  PMH HTN, GERD, Pancreatic ca s/p whipple procedure    # DAISY (last serum creatinine = 1.06 noted in 2017)   - likely prerenal due to decreased oral intake plus valsartan   - No acute indication for RRT at the moment.   - BP noted on lower side, no need for anti-HTN meds for now   - VBG suggestive of high AG metabolic acidemia plus respiratory alkalosis plus metabolic alkalosis on delta/delta. but can not be completely certain with VBG   - hold valsartan for now, No ACE/ARBs   - cont. IVF with NS at 75 cc/hr   - Strict I/O   - check UA, urine creatinine, Na, urea   - check renal bladder sono   - repeat BMP, trend creatinine   - Avoid nephrotoxins and hypotension   - will follow

## 2019-04-15 NOTE — ED PROVIDER NOTE - CARE PLAN
Principal Discharge DX:	SBO (small bowel obstruction)  Secondary Diagnosis:	Incarcerated hernia Principal Discharge DX:	SBO (small bowel obstruction)  Secondary Diagnosis:	Incarcerated hernia  Secondary Diagnosis:	DAISY (acute kidney injury)  Secondary Diagnosis:	Dehydration  Secondary Diagnosis:	Vomiting

## 2019-04-15 NOTE — ED PROVIDER NOTE - PROGRESS NOTE DETAILS
NEPHROLOGY CONSULTED, SECOND LITER OF FLUID INFUSING. PT COMPLETED ORAL CONTRAST FOR CT SCAN. HUNT PLACED, 20 CC DARK URINE RETURNED. SECOND LITER IVFS COMPLETED. NEPHROLOGY FELLOW AT BEDSIDE. CT SCAN D/W RADIOLOGIST. SURGERY CONSULTED. ABX ORDERED. SURGICAL RESIDENT AT PTS BEDSIDE.

## 2019-04-15 NOTE — H&P ADULT - NSHPPHYSICALEXAM_GEN_ALL_CORE
Vitals: T(F): 97.4 (04-15-19 @ 15:34), Max: 99.4 (04-15-19 @ 13:24)  HR: 97 (04-15-19 @ 15:34)  BP: 108/89 (04-15-19 @ 15:34) (80/50 - 108/89)  RR: 20 (04-15-19 @ 15:34)  SpO2: 96% (04-15-19 @ 15:34)      Physical Examination:  General Appearance: NAD  HEENT: EOMI, sclera non-icteric.  Heart: RRR   Lungs: CTABL.   Abdomen:  Soft, nondistended. Pinpoint tenderness over palpable incarcerated bowel loop beneath well-healed midline laparotomy scar. Otherwise nontender.  MSK/Extremities: Warm & well-perfused.   Skin: Warm, dry. No jaundice.

## 2019-04-15 NOTE — CONSULT NOTE ADULT - ASSESSMENT
ASSESSMENT/PLAN: 68yMale with hx of panc ca s/p whipple in 2010 presents with SBO likely 2/2 to hernia     Neurologic:  Pain control:  -     Respiratory:  - No acute issues  - Nc, good oxygen sat  - IS/PT    Cardiovascular:  Hx of HTN:   - No meds on file but reports taking amlodipine, valsartan and a statin (need to do a med rec)  - BP currently soft do will not restart and meds at present    - No other CV issues    Gastrointestinal/Nutrition:  SBO:   - NGT in place  - Will replace op with 1/2 LR q4-6 hours  - Strict NPO  - Monitor abdominal exam  - PPI  - Possible OR tonight/vladimir    Ventral hernia?  - Patient states was unable to reduce for past couple days (may have caused SBO?)  - Currently reduced, monitor    Genitourinary/Renal:  DAISY:  - Cr 7 on arrival ,lactate 2.5 recieved 4L IVF in ED  - F/u repeat labs  - IVF  - Hathaway, strict I/Os     Hematologic:  - No acute issues  - Monitor H/H  - Hep Sq    Infectious Disease:  Leukocytosis:   - Monitor  - Currently no need for abx    Endocrine:  - No hx of DM  - FS q6h while NPO    Disposition: SICU ASSESSMENT/PLAN: 68yMale with hx of panc ca s/p whipple in 2010 presents with SBO likely 2/2 to hernia     Neurologic:  Pain control:  - Dilaudid prn      Respiratory:  - No acute issues  - Nc, good oxygen sat  - IS/PT    Cardiovascular:  Hx of HTN:   - No meds on file but reports taking amlodipine, valsartan and a statin (need to do a med rec)  - BP currently soft do will not restart and meds at present    - No other CV issues    Gastrointestinal/Nutrition:  SBO:   - NGT in place  - Will replace op with 1/2 LR q4-6 hours  - Strict NPO  - Monitor abdominal exam  - PPI  - Possible OR tonight/vladimir    Ventral hernia?  - Patient states was unable to reduce for past couple days (may have caused SBO?)  - Currently reduced, monitor    Genitourinary/Renal:  DAISY:  - Cr 7 on arrival ,lactate 2.5 recieved 4L IVF in ED  - F/u repeat labs  - IVF  - Hathaway, strict I/Os     Hematologic:  - No acute issues  - Monitor H/H  - Hep Sq    Infectious Disease:  Leukocytosis:   - Monitor  - Currently no need for abx    Endocrine:  - No hx of DM  - FS q6h while NPO    Disposition: SICU ASSESSMENT/PLAN: 68yMale with hx of panc ca s/p whipple in 2010 presents with SBO likely 2/2 to hernia     Neurologic:  Pain control:  - Dilaudid prn      Respiratory:  - No acute issues  - Nc, good oxygen sat  - IS/PT    Cardiovascular:  Hx of HTN:   - No meds on file but reports taking amlodipine, valsartan and a statin (need to do a med rec)  - BP currently soft do will not restart and meds at present    - No other CV issues    Gastrointestinal/Nutrition:  SBO:   - NGT in place  - Will replace op with 1/2 LR q4-6 hours  - Strict NPO  - Monitor abdominal exam  - PPI  - Possible OR tonight/vladimir    Ventral hernia?  - Patient states was unable to reduce for past couple days (may have caused SBO?)  - Currently reduced, monitor    Genitourinary/Renal:  DAISY:  - Cr 7 on arrival ,lactate 2.5 recieved 4L IVF in ED  - F/u repeat labs  - IVF  - Hathaway, strict I/Os     Hematologic:  - No acute issues  - Monitor H/H  - Hep Sq    Infectious Disease:  Leukocytosis:   - Monitor  - Cipro/Flagyl    Endocrine:  - No hx of DM  - FS q6h while NPO    Disposition: SICU ASSESSMENT/PLAN: 68yMale with hx of panc ca s/p whipple in 2010 presents with SBO likely 2/2 to hernia/incarcerated      Neurologic:  Pain control:  - Dilaudid prn      Respiratory:  - No acute issues  - Nc, good oxygen sat  - IS/PT    Cardiovascular:  Hx of HTN:   - No meds on file but reports taking amlodipine, valsartan and a statin (need to do a med rec)  - BP currently soft do will not restart and meds at present    - No other CV issues    Gastrointestinal/Nutrition:  SBO:   - NGT in place  - Will replace op with 1/2 LR q4-6 hours  - Strict NPO  - Monitor abdominal exam  - PPI  - Possible OR tonight/vladimir    Ventral hernia/incarcerated hernia?  - Patient states was unable to reduce for past couple days (may have caused SBO?)  - Currently reduced, monitor    Genitourinary/Renal:  DAISY:  - Cr 7 on arrival ,lactate 2.5 recieved 4L IVF in ED  - F/u repeat labs  - IVF  - Hathaway, strict I/Os     Hematologic:  - No acute issues  - Monitor H/H  - Hep Sq    Infectious Disease:  Leukocytosis:   - Monitor  - Cipro/Flagyl    Endocrine:  - No hx of DM  - FS q6h while NPO    Disposition: SICU ASSESSMENT/PLAN: 68yMale with hx of panc ca s/p whipple in 2010 presents with SBO likely 2/2 to hernia/incarcerated      Neurologic:  Pain control:  - Dilaudid prn      Respiratory:  - No acute issues  - Nc, good oxygen sat  - IS/PT    Cardiovascular:  Hx of HTN:   - No meds on file but reports taking amlodipine, valsartan and a statin (need to do a med rec)  - BP currently soft do will not restart and meds at present    - No other CV issues    Gastrointestinal/Nutrition:  SBO:   - NGT in place  - Will replace op with 1/2 LR q4-6 hours  - Strict NPO  - Monitor abdominal exam  - PPI  - Possible OR tonight/vladimir    Partially reducible ventral hernia  - Patient states was unable to reduce for past couple days (may have caused SBO?)  - Currently not appreciated on exam,    Genitourinary/Renal:  DAISY:  - Cr 7 on arrival ,lactate 2.5 recieved 4L IVF in ED  - F/u repeat labs  - IVF  - Hathaway, strict I/Os     Hematologic:  - No acute issues  - Monitor H/H  - Hep Sq    Infectious Disease:  Leukocytosis:   - Monitor  - Cipro/Flagyl    Endocrine:  - No hx of DM  - FS q6h while NPO    Disposition: SICU

## 2019-04-15 NOTE — H&P ADULT - NSCORESITESY/N_GEN_A_CORE_RD
HPI:  The patient returns for another Cervical epidural steroid injection today.  They have received >50% improvement since their last injection with a pain level of 5/10 at its worst recently.    Conservative measures tried in the interim     Exam:  There were no vitals taken for this visit.  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  Physical therapy exercises at home as prescribed by physical therapy or from the pain clinic handout (given to the patient).  Continuation of these exercises every day, or multiple times per week, even when the patient has good pain relief, was stressed to the patient as a preventative measure to decrease the frequency and severity of future pain episodes.  2.  Continue pain medicines as already prescribed.  If patient not currently taking any, it is recommended to begin Acetaminophen 1000 mg po q 8 hours.  If other medicines containing Acetaminophen are currently prescribed, maintain daily dose at 3000mg.    3.  If they can tolerate NSAIDS, it is recommended to take Ibuprofen 600 mg po q 6 hours for 7 days during pain exacerbations.  Alternatively, they may substitute an NSAID of their choice (e.g. Aleve)  4.  Heat and ice to the affected area as tolerated for pain control.  It was discussed that heating pads can cause burns.  5.  Low impact exercise such as walking or water exercise was recommended to maintain overall health and aid in weight control.   6.  Follow up as needed for subsequent injections.  7.  Patient was counseled to abstain from tobacco products.      
No

## 2019-04-15 NOTE — ED PROVIDER NOTE - PHYSICAL EXAMINATION
CONSTITUTIONAL: Well-appearing; well-nourished; in no apparent distress.   HEAD: Normocephalic; atraumatic.   EYES: PERRL; EOM intact. Conjunctiva normal B/L.   ENT: Normal pharynx with no tonsillar hypertrophy. + dry mucous membranes.  NECK: Supple; non-tender; no cervical lymphadenopathy.   CHEST: Normal chest excursion with respiration.   CARDIOVASCULAR: Tachycardia S1, S2; no murmurs, rubs, or gallops.   RESPIRATORY: Normal chest excursion with respiration; breath sounds clear and equal bilaterally; no wheezes, rhonchi, or rales.  GI/: Normal bowel sounds; + distended; non-tender.  BACK: No evidence of trauma or deformity. Non-tender to palpation. No CVA tenderness.   EXT: Normal ROM in all four extremities; non-tender to palpation; distal pulses are normal. No leg edema B/L.   SKIN: Normal for age and race; warm; dry; good turgor.  NEURO: A & O x 4; CN 2-12 intact. Grossly unremarkable.

## 2019-04-15 NOTE — H&P ADULT - NSHPLABSRESULTS_GEN_ALL_CORE
Labs:                        17.5   22.80 )-----------( 309      ( 15 Apr 2019 13:10 )             54.3     04-15    138  |  83<L>  |  66<HH>  ----------------------------<  135<H>  4.6   |  29  |  7.0<HH>    Ca    9.7      15 Apr 2019 13:10  Mg     2.5     04-15    TPro  8.1<H>  /  Alb  4.5  /  TBili  0.9  /  DBili  x   /  AST  13  /  ALT  12  /  AlkPhos  95  04-15    LIVER FUNCTIONS - ( 15 Apr 2019 13:10 )  Alb: 4.5 g/dL / Pro: 8.1 g/dL / ALK PHOS: 95 U/L / ALT: 12 U/L / AST: 13 U/L / GGT: x               CARDIAC MARKERS ( 15 Apr 2019 13:10 )  x     / <0.01 ng/mL / x     / x     / x          Imaging:     < from: CT Abdomen and Pelvis w/ Oral Cont (04.15.19 @ 17:38) >    1. Moderate gastric distention and multiple dilated loops of small bowel   extending to the level of a moderate size paraumbilical hernia containing   a short segment of thickenedsmall bowel, compatible with small bowel   obstruction; correlation for signs of incarceration is suggested.    2. Status post Whipple procedure with moderate pneumobilia, overall   unchanged since September 14, 2010.    < end of copied text >

## 2019-04-15 NOTE — H&P ADULT - ATTENDING COMMENTS
pt examined multiple times on 4/15  no abdominal pain or tenderness  reducible incisional hernia.   NG tube green bilious 800 ml  Hathaway dark urine     Pt with severe DAISY with likely a combination of dehydration and intrarenal  continue resuscitation.  Serial abdominal exam.   nephrology consult.

## 2019-04-15 NOTE — ED ADULT NURSE NOTE - NSIMPLEMENTINTERV_GEN_ALL_ED
Implemented All Fall with Harm Risk Interventions:  [place] to call system. Call bell, personal items and telephone within reach. Instruct patient to call for assistance. Room bathroom lighting operational. Non-slip footwear when patient is off stretcher. Physically safe environment: no spills, clutter or unnecessary equipment. Stretcher in lowest position, wheels locked, appropriate side rails in place. Provide visual cue, wrist band, yellow gown, etc. Monitor gait and stability. Monitor for mental status changes and reorient to person, place, and time. Review medications for side effects contributing to fall risk. Reinforce activity limits and safety measures with patient and family. Provide visual clues: red socks.

## 2019-04-16 LAB
ANION GAP SERPL CALC-SCNC: 13 MMOL/L — SIGNIFICANT CHANGE UP (ref 7–14)
ANION GAP SERPL CALC-SCNC: 14 MMOL/L — SIGNIFICANT CHANGE UP (ref 7–14)
BUN SERPL-MCNC: 56 MG/DL — HIGH (ref 10–20)
BUN SERPL-MCNC: 63 MG/DL — CRITICAL HIGH (ref 10–20)
CALCIUM SERPL-MCNC: 8.2 MG/DL — LOW (ref 8.5–10.1)
CALCIUM SERPL-MCNC: 8.3 MG/DL — LOW (ref 8.5–10.1)
CHLORIDE SERPL-SCNC: 96 MMOL/L — LOW (ref 98–110)
CHLORIDE SERPL-SCNC: 96 MMOL/L — LOW (ref 98–110)
CO2 SERPL-SCNC: 30 MMOL/L — SIGNIFICANT CHANGE UP (ref 17–32)
CO2 SERPL-SCNC: 31 MMOL/L — SIGNIFICANT CHANGE UP (ref 17–32)
CREAT SERPL-MCNC: 2.8 MG/DL — HIGH (ref 0.7–1.5)
CREAT SERPL-MCNC: 3.6 MG/DL — HIGH (ref 0.7–1.5)
GAS PNL BLDA: SIGNIFICANT CHANGE UP
GAS PNL BLDV: SIGNIFICANT CHANGE UP
GLUCOSE BLDC GLUCOMTR-MCNC: 91 MG/DL — SIGNIFICANT CHANGE UP (ref 70–99)
GLUCOSE BLDC GLUCOMTR-MCNC: 96 MG/DL — SIGNIFICANT CHANGE UP (ref 70–99)
GLUCOSE SERPL-MCNC: 108 MG/DL — HIGH (ref 70–99)
GLUCOSE SERPL-MCNC: 116 MG/DL — HIGH (ref 70–99)
HCT VFR BLD CALC: 39.9 % — LOW (ref 42–52)
HCV AB S/CO SERPL IA: 0.14 S/CO — SIGNIFICANT CHANGE UP (ref 0–0.99)
HCV AB SERPL-IMP: SIGNIFICANT CHANGE UP
HGB BLD-MCNC: 13 G/DL — LOW (ref 14–18)
INR BLD: 1.2 RATIO — SIGNIFICANT CHANGE UP (ref 0.65–1.3)
MAGNESIUM SERPL-MCNC: 2 MG/DL — SIGNIFICANT CHANGE UP (ref 1.8–2.4)
MCHC RBC-ENTMCNC: 26.6 PG — LOW (ref 27–31)
MCHC RBC-ENTMCNC: 32.6 G/DL — SIGNIFICANT CHANGE UP (ref 32–37)
MCV RBC AUTO: 81.6 FL — SIGNIFICANT CHANGE UP (ref 80–94)
NRBC # BLD: 0 /100 WBCS — SIGNIFICANT CHANGE UP (ref 0–0)
PHOSPHATE SERPL-MCNC: 4 MG/DL — SIGNIFICANT CHANGE UP (ref 2.1–4.9)
PLATELET # BLD AUTO: 158 K/UL — SIGNIFICANT CHANGE UP (ref 130–400)
POTASSIUM SERPL-MCNC: 3.6 MMOL/L — SIGNIFICANT CHANGE UP (ref 3.5–5)
POTASSIUM SERPL-MCNC: 4.2 MMOL/L — SIGNIFICANT CHANGE UP (ref 3.5–5)
POTASSIUM SERPL-SCNC: 3.6 MMOL/L — SIGNIFICANT CHANGE UP (ref 3.5–5)
POTASSIUM SERPL-SCNC: 4.2 MMOL/L — SIGNIFICANT CHANGE UP (ref 3.5–5)
PROTHROM AB SERPL-ACNC: 13.8 SEC — HIGH (ref 9.95–12.87)
RBC # BLD: 4.89 M/UL — SIGNIFICANT CHANGE UP (ref 4.7–6.1)
RBC # FLD: 14.8 % — HIGH (ref 11.5–14.5)
SODIUM SERPL-SCNC: 140 MMOL/L — SIGNIFICANT CHANGE UP (ref 135–146)
SODIUM SERPL-SCNC: 140 MMOL/L — SIGNIFICANT CHANGE UP (ref 135–146)
WBC # BLD: 13.39 K/UL — HIGH (ref 4.8–10.8)
WBC # FLD AUTO: 13.39 K/UL — HIGH (ref 4.8–10.8)

## 2019-04-16 PROCEDURE — 71045 X-RAY EXAM CHEST 1 VIEW: CPT | Mod: 26

## 2019-04-16 PROCEDURE — 99233 SBSQ HOSP IP/OBS HIGH 50: CPT

## 2019-04-16 PROCEDURE — 99291 CRITICAL CARE FIRST HOUR: CPT

## 2019-04-16 PROCEDURE — 93010 ELECTROCARDIOGRAM REPORT: CPT

## 2019-04-16 RX ORDER — ATORVASTATIN CALCIUM 80 MG/1
0 TABLET, FILM COATED ORAL
Qty: 30 | Refills: 0 | COMMUNITY

## 2019-04-16 RX ORDER — POTASSIUM CHLORIDE 20 MEQ
10 PACKET (EA) ORAL ONCE
Qty: 0 | Refills: 0 | Status: DISCONTINUED | OUTPATIENT
Start: 2019-04-16 | End: 2019-04-16

## 2019-04-16 RX ORDER — AZELASTINE 137 UG/1
0 SPRAY, METERED NASAL
Qty: 30 | Refills: 0 | COMMUNITY

## 2019-04-16 RX ORDER — SODIUM CHLORIDE 9 MG/ML
500 INJECTION, SOLUTION INTRAVENOUS ONCE
Qty: 0 | Refills: 0 | Status: COMPLETED | OUTPATIENT
Start: 2019-04-16 | End: 2019-04-16

## 2019-04-16 RX ORDER — METRONIDAZOLE 500 MG
500 TABLET ORAL EVERY 8 HOURS
Qty: 0 | Refills: 0 | Status: DISCONTINUED | OUTPATIENT
Start: 2019-04-16 | End: 2019-04-16

## 2019-04-16 RX ORDER — SODIUM CHLORIDE 9 MG/ML
1000 INJECTION, SOLUTION INTRAVENOUS
Qty: 0 | Refills: 0 | Status: DISCONTINUED | OUTPATIENT
Start: 2019-04-16 | End: 2019-04-16

## 2019-04-16 RX ORDER — METRONIDAZOLE 500 MG
500 TABLET ORAL ONCE
Qty: 0 | Refills: 0 | Status: COMPLETED | OUTPATIENT
Start: 2019-04-16 | End: 2019-04-16

## 2019-04-16 RX ORDER — CIPROFLOXACIN LACTATE 400MG/40ML
VIAL (ML) INTRAVENOUS
Qty: 0 | Refills: 0 | Status: DISCONTINUED | OUTPATIENT
Start: 2019-04-16 | End: 2019-04-16

## 2019-04-16 RX ORDER — METRONIDAZOLE 500 MG
TABLET ORAL
Qty: 0 | Refills: 0 | Status: DISCONTINUED | OUTPATIENT
Start: 2019-04-16 | End: 2019-04-16

## 2019-04-16 RX ORDER — CIPROFLOXACIN LACTATE 400MG/40ML
200 VIAL (ML) INTRAVENOUS ONCE
Qty: 0 | Refills: 0 | Status: COMPLETED | OUTPATIENT
Start: 2019-04-16 | End: 2019-04-16

## 2019-04-16 RX ORDER — POTASSIUM CHLORIDE 20 MEQ
20 PACKET (EA) ORAL ONCE
Qty: 0 | Refills: 0 | Status: DISCONTINUED | OUTPATIENT
Start: 2019-04-16 | End: 2019-04-16

## 2019-04-16 RX ORDER — SODIUM CHLORIDE 9 MG/ML
1000 INJECTION, SOLUTION INTRAVENOUS
Qty: 0 | Refills: 0 | Status: DISCONTINUED | OUTPATIENT
Start: 2019-04-16 | End: 2019-04-17

## 2019-04-16 RX ORDER — SODIUM CHLORIDE 9 MG/ML
500 INJECTION, SOLUTION INTRAVENOUS
Qty: 0 | Refills: 0 | Status: DISCONTINUED | OUTPATIENT
Start: 2019-04-16 | End: 2019-04-16

## 2019-04-16 RX ORDER — POTASSIUM CHLORIDE 20 MEQ
20 PACKET (EA) ORAL
Qty: 0 | Refills: 0 | Status: COMPLETED | OUTPATIENT
Start: 2019-04-16 | End: 2019-04-16

## 2019-04-16 RX ADMIN — Medication 100 MILLIGRAM(S): at 01:52

## 2019-04-16 RX ADMIN — SODIUM CHLORIDE 125 MILLILITER(S): 9 INJECTION, SOLUTION INTRAVENOUS at 00:05

## 2019-04-16 RX ADMIN — Medication 100 MILLIGRAM(S): at 06:24

## 2019-04-16 RX ADMIN — Medication 50 MILLIEQUIVALENT(S): at 06:27

## 2019-04-16 RX ADMIN — SODIUM CHLORIDE 150 MILLILITER(S): 9 INJECTION, SOLUTION INTRAVENOUS at 03:28

## 2019-04-16 RX ADMIN — HEPARIN SODIUM 5000 UNIT(S): 5000 INJECTION INTRAVENOUS; SUBCUTANEOUS at 05:16

## 2019-04-16 RX ADMIN — SODIUM CHLORIDE 150 MILLILITER(S): 9 INJECTION, SOLUTION INTRAVENOUS at 10:46

## 2019-04-16 RX ADMIN — SODIUM CHLORIDE 600 MILLILITER(S): 9 INJECTION, SOLUTION INTRAVENOUS at 03:48

## 2019-04-16 RX ADMIN — Medication 100 MILLIGRAM(S): at 03:25

## 2019-04-16 RX ADMIN — PANTOPRAZOLE SODIUM 40 MILLIGRAM(S): 20 TABLET, DELAYED RELEASE ORAL at 12:06

## 2019-04-16 RX ADMIN — HEPARIN SODIUM 5000 UNIT(S): 5000 INJECTION INTRAVENOUS; SUBCUTANEOUS at 13:59

## 2019-04-16 RX ADMIN — Medication 50 MILLIEQUIVALENT(S): at 08:19

## 2019-04-16 RX ADMIN — SODIUM CHLORIDE 75 MILLILITER(S): 9 INJECTION, SOLUTION INTRAVENOUS at 18:59

## 2019-04-16 RX ADMIN — Medication 100 MILLIGRAM(S): at 13:59

## 2019-04-16 RX ADMIN — HEPARIN SODIUM 5000 UNIT(S): 5000 INJECTION INTRAVENOUS; SUBCUTANEOUS at 21:10

## 2019-04-16 NOTE — PROGRESS NOTE ADULT - SUBJECTIVE AND OBJECTIVE BOX
EVELIO LLAMAS  [id number]  68y Male    Indication for ICU admission:    Admit Date:04-15-19  ICU Date:  OR Date:    No Known Allergies    PAST MEDICAL & SURGICAL HISTORY:  HLD (hyperlipidemia)  HTN (hypertension)  History of pancreatic surgery    Home Medications:  amLODIPine 10 mg oral tablet: 1 tab(s) orally once a day (16 Apr 2019 02:15)  atorvastatin 10 mg oral tablet: 1 tab(s) orally once a day (16 Apr 2019 02:16)  ferrous sulfate 325 mg (65 mg elemental iron) oral delayed release tablet: 1 tab(s) orally every other day (16 Apr 2019 02:18)  lansoprazole 15 mg oral delayed release capsule: 1 cap(s) orally once a day (16 Apr 2019 02:16)  valsartan-hydrochlorothiazide 320mg-25mg oral tablet: 1 tab(s) orally once a day (16 Apr 2019 02:15)  Zenpep 15,000 units-47,000 units-63,000 units oral delayed release capsule: 1 tab(s) orally once a day, As Needed (16 Apr 2019 02:17)        24HRS EVENT:  1d        DVT PTX:     GI PTX:    ***Tubes/Lines/Drains  ***  Peripheral IV  Central Venous Line     	Date   Arterial Line		                Date   [] PICC:         	[] Midline		[] Mediport             Urinary Catheter		Indication: Strict I&O    Date Placed:       REVIEW OF SYSTEMS    [ ] A ten-point review of systems was otherwise negative except as noted.  [ ] Due to altered mental status/intubation, subjective information were not able to be obtained from the patient. History was obtained, to the extent possible, from review of the chart and collateral sources of information. EVELIO LLAMAS  [id number]  68y Male    Indication for ICU admission: SBO 2/2 incarcerated ventral hernia with DAISY    Admit Date:04-15-19  ICU Date: 4/15/2019  OR Date: 4/15/2019    No Known Allergies    PAST MEDICAL & SURGICAL HISTORY:  AMY   HLD (hyperlipidemia)  HTN (hypertension)  History of pancreatic surgery    Home Medications:  amLODIPine 10 mg oral tablet: 1 tab(s) orally once a day (16 Apr 2019 02:15)  atorvastatin 10 mg oral tablet: 1 tab(s) orally once a day (16 Apr 2019 02:16)  ferrous sulfate 325 mg (65 mg elemental iron) oral delayed release tablet: 1 tab(s) orally every other day (16 Apr 2019 02:18)  lansoprazole 15 mg oral delayed release capsule: 1 cap(s) orally once a day (16 Apr 2019 02:16)  valsartan-hydrochlorothiazide 320mg-25mg oral tablet: 1 tab(s) orally once a day (16 Apr 2019 02:15)  Zenpep 15,000 units-47,000 units-63,000 units oral delayed release capsule: 1 tab(s) orally once a day, As Needed (16 Apr 2019 02:17)        24HRS EVENT:       GI PTX: ppi    ***Tubes/Lines/Drains  ***  Peripheral IV          Urinary Catheter		Indication: Strict I&O    Date Placed: 4/15/2019      REVIEW OF SYSTEMS    [x ] A ten-point review of systems was otherwise negative except as noted.  [ ] Due to altered mental status/intubation, subjective information were not able to be obtained from the patient. History was obtained, to the extent possible, from review of the chart and collateral sources of information.        Daily     Daily     Diet, NPO (04-15-19 @ 20:21)      CURRENT MEDS:  Neurologic Medications    Respiratory Medications    Cardiovascular Medications    Gastrointestinal Medications  lactated ringers. 1000 milliLiter(s) IV Continuous <Continuous>  pantoprazole  Injectable 40 milliGRAM(s) IV Push daily    Genitourinary Medications    Hematologic/Oncologic Medications  heparin  Injectable 5000 Unit(s) SubCutaneous every 8 hours    Antimicrobial/Immunologic Medications  ciprofloxacin   IVPB      metroNIDAZOLE  IVPB      metroNIDAZOLE  IVPB 500 milliGRAM(s) IV Intermittent every 8 hours    Endocrine/Metabolic Medications    Topical/Other Medications  chlorhexidine 4% Liquid 1 Application(s) Topical <User Schedule>      ICU Vital Signs Last 24 Hrs  T(C): 37.2 (16 Apr 2019 12:00), Max: 37.4 (15 Apr 2019 13:24)  T(F): 98.9 (16 Apr 2019 12:00), Max: 99.4 (15 Apr 2019 13:24)  HR: 103 (16 Apr 2019 12:00) (93 - 115)  BP: 129/70 (16 Apr 2019 12:00) (80/50 - 145/66)  BP(mean): --  ABP: --  ABP(mean): --  RR: 18 (16 Apr 2019 12:00) (18 - 20)  SpO2: 95% (16 Apr 2019 12:00) (95% - 97%)      Adult Advanced Hemodynamics Last 24 Hrs  CVP(mm Hg): --  CVP(cm H2O): --  CO: --  CI: --  PA: --  PA(mean): --  PCWP: --  SVR: --  SVRI: --  PVR: --  PVRI: --      ABG - ( 16 Apr 2019 04:56 )  pH, Arterial: 7.45  pH, Blood: x     /  pCO2: 48    /  pO2: 66    / HCO3: 33    / Base Excess: 8.1   /  SaO2: 93                  I&O's Summary    15 Apr 2019 07:01  -  16 Apr 2019 07:00  --------------------------------------------------------  IN: 2000 mL / OUT: 3240 mL / NET: -1240 mL    16 Apr 2019 07:01  -  16 Apr 2019 12:32  --------------------------------------------------------  IN: 850 mL / OUT: 1010 mL / NET: -160 mL      I&O's Detail    15 Apr 2019 07:01  -  16 Apr 2019 07:00  --------------------------------------------------------  IN:    IV PiggyBack: 300 mL    Lactated Ringers IV Bolus: 500 mL    lactated ringers.: 450 mL    lactated ringers.: 500 mL    lactated ringers.: 250 mL  Total IN: 2000 mL    OUT:    Nasoenteral Tube: 1960 mL    Voided: 1280 mL  Total OUT: 3240 mL    Total NET: -1240 mL      16 Apr 2019 07:01  -  16 Apr 2019 12:32  --------------------------------------------------------  IN:    IV PiggyBack: 100 mL    lactated ringers.: 750 mL  Total IN: 850 mL    OUT:    Indwelling Catheter - Urethral: 510 mL    Nasoenteral Tube: 500 mL  Total OUT: 1010 mL    Total NET: -160 mL          PHYSICAL EXAM:    General/Neuro  RASS:             GCS: 15    Deficits:                             alert & oriented x 3, no focal deficits  Pupils: 3mm    Lungs:      clear to auscultation, Normal expansion/effort.     Cardiovascular : S1, S2.  Regular rate and rhythm.  Peripheral edema   Cardiac Rhythm: Normal Sinus Rhythm    GI: Abdomen soft, Mildly tender, mildly distended. Irreducible ventral paraumbilical hernia     Nasogastric tube in place.       Extremities: Extremities warm, pink, well-perfused. Pulses: 4+ bilaterally    Derm: Good skin turgor, no skin breakdown.      :       Hathaway catheter in place.      CXR:     LABS:  CAPILLARY BLOOD GLUCOSE      POCT Blood Glucose.: 96 mg/dL (16 Apr 2019 02:54)                          13.0   13.39 )-----------( 158      ( 16 Apr 2019 04:50 )             39.9       04-16    140  |  96<L>  |  56<H>  ----------------------------<  108<H>  4.2   |  31  |  2.8<H>    Ca    8.3<L>      16 Apr 2019 10:49  Phos  4.0     04-16  Mg     2.0     04-16    TPro  8.1<H>  /  Alb  4.5  /  TBili  0.9  /  DBili  x   /  AST  13  /  ALT  12  /  AlkPhos  95  04-15      PT/INR - ( 16 Apr 2019 04:50 )   PT: 13.80 sec;   INR: 1.20 ratio         PTT - ( 15 Apr 2019 19:45 )  PTT:25.3 sec  CARDIAC MARKERS ( 15 Apr 2019 13:10 )  x     / <0.01 ng/mL / x     / x     / x

## 2019-04-16 NOTE — PROGRESS NOTE ADULT - SUBJECTIVE AND OBJECTIVE BOX
Nephrology progress note    Patient is seen and examined, events over the last 24 h noted. Good urine output.  Improving abdominal pain. Pt with NG tube still producing bilious output on suction.  No chest pain, shortness of breath.  Still no bowel movements.     Allergies:  No Known Allergies    Hospital Medications:   MEDICATIONS  (STANDING):  chlorhexidine 4% Liquid 1 Application(s) Topical <User Schedule>  ciprofloxacin   IVPB      heparin  Injectable 5000 Unit(s) SubCutaneous every 8 hours  lactated ringers. 1000 milliLiter(s) (150 mL/Hr) IV Continuous <Continuous>  metroNIDAZOLE  IVPB      metroNIDAZOLE  IVPB 500 milliGRAM(s) IV Intermittent every 8 hours  pantoprazole  Injectable 40 milliGRAM(s) IV Push daily        VITALS:  T(F): 99.2 (04-15-19 @ 21:23), Max: 99.4 (04-15-19 @ 13:24)  HR: 98 (04-16-19 @ 10:00)  BP: 128/59 (04-16-19 @ 10:00)  RR: 18 (04-16-19 @ 10:00)  SpO2: 97% (04-16-19 @ 10:00)  Wt(kg): --    04-15 @ 07:01  -  04-16 @ 07:00  --------------------------------------------------------  IN: 2000 mL / OUT: 3240 mL / NET: -1240 mL    04-16 @ 07:01  -  04-16 @ 11:27  --------------------------------------------------------  IN: 700 mL / OUT: 860 mL / NET: -160 mL          PHYSICAL EXAM:  Constitutional: NAD, Resting comfortably  HEENT: anicteric sclera, oropharynx clear, MMM  Neck: No JVD  Respiratory: CTAB, no crackles, wheezes or rubs  Cardiovascular: RRR, S1, S2, tachycardic, systolic murmur  Gastrointestinal: soft, tender upon palpation on ventral scar, non distended. Well healed ventral scar. unable to visualize hernia.   Extremities: No cyanosis or clubbing. No peripheral edema  Neurological: A/O x 3, no focal deficits  Psychiatric: Normal mood, normal affect  : No CVA tenderness. Positive Hathaway  Skin: No rashes    LABS:  04-16    140  |  96<L>  |  63<HH>  ----------------------------<  116<H>  3.6   |  30  |  3.6<H>    Ca    8.2<L>      16 Apr 2019 04:50  Phos  4.0     04-16  Mg     2.0     04-16    TPro  8.1<H>  /  Alb  4.5  /  TBili  0.9  /  DBili      /  AST  13  /  ALT  12  /  AlkPhos  95  04-15    Creatinine, Serum: 3.6 (04-16)  Creatinine, Serum: 5.3 (04-15)  Creatinine, Serum: 7.0 (04-15)                          13.0   13.39 )-----------( 158      ( 16 Apr 2019 04:50 )             39.9       Urine Studies:      RADIOLOGY & ADDITIONAL STUDIES:        < from: CT Abdomen and Pelvis w/ Oral Cont (04.15.19 @ 17:38) >  IMPRESSION:    1. Moderate gastric distention and multiple dilated loops of small bowel extending to the level of a moderate size paraumbilical hernia containing a short segment of thickened small bowel, compatible with small bowel obstruction; correlation for signs of incarceration is suggested.    2. Status post Whipple procedure with moderate pneumobilia, overall unchanged since September 14, 2010.    < end of copied text > Nephrology progress note    Patient is seen and examined, events over the last 24 h noted. Good urine output.  Improving abdominal pain. Pt with NG tube still producing bilious output on suction.  No chest pain, shortness of breath.  Still no bowel movements.     Allergies:  No Known Allergies    Hospital Medications:   MEDICATIONS  (STANDING):  chlorhexidine 4% Liquid 1 Application(s) Topical <User Schedule>  ciprofloxacin   IVPB      heparin  Injectable 5000 Unit(s) SubCutaneous every 8 hours  lactated ringers. 1000 milliLiter(s) (150 mL/Hr) IV Continuous <Continuous>  metroNIDAZOLE  IVPB      metroNIDAZOLE  IVPB 500 milliGRAM(s) IV Intermittent every 8 hours  pantoprazole  Injectable 40 milliGRAM(s) IV Push daily        VITALS:  T(F): 99.2 (04-15-19 @ 21:23), Max: 99.4 (04-15-19 @ 13:24)  HR: 98 (04-16-19 @ 10:00)  BP: 128/59 (04-16-19 @ 10:00)  RR: 18 (04-16-19 @ 10:00)  SpO2: 97% (04-16-19 @ 10:00)  Wt(kg): --    04-15 @ 07:01  -  04-16 @ 07:00  --------------------------------------------------------  IN: 2000 mL / OUT: 3240 mL / NET: -1240 mL    04-16 @ 07:01  -  04-16 @ 11:27  --------------------------------------------------------  IN: 700 mL / OUT: 860 mL / NET: -160 mL          PHYSICAL EXAM:  Constitutional: NAD, Resting comfortably  HEENT: anicteric sclera, oropharynx clear, MMM  Neck: No JVD  Respiratory: CTAB, no crackles, wheezes or rubs  Cardiovascular: RRR, S1, S2, tachycardic, systolic murmur  Gastrointestinal: soft, tender upon palpation on ventral scar, non distended. Well healed ventral scar. unable to visualize hernia.   Extremities: No cyanosis or clubbing. No peripheral edema  Neurological: A/O x 3, no focal deficits  : No CVA tenderness. Positive Hathaway  Skin: No rashes    LABS:  04-16    140  |  96<L>  |  63<HH>  ----------------------------<  116<H>  3.6   |  30  |  3.6<H>  Creatinine Trend: 2.8<--, 3.6<--, 5.3<--, 7.0<--  Ca    8.2<L>      16 Apr 2019 04:50  Phos  4.0     04-16  Mg     2.0     04-16    TPro  8.1<H>  /  Alb  4.5  /  TBili  0.9  /  DBili      /  AST  13  /  ALT  12  /  AlkPhos  95  04-15    Creatinine, Serum: 3.6 (04-16)  Creatinine, Serum: 5.3 (04-15)  Creatinine, Serum: 7.0 (04-15)                          13.0   13.39 )-----------( 158      ( 16 Apr 2019 04:50 )             39.9       Urine Studies:      RADIOLOGY & ADDITIONAL STUDIES:        < from: CT Abdomen and Pelvis w/ Oral Cont (04.15.19 @ 17:38) >  IMPRESSION:    1. Moderate gastric distention and multiple dilated loops of small bowel extending to the level of a moderate size paraumbilical hernia containing a short segment of thickened small bowel, compatible with small bowel obstruction; correlation for signs of incarceration is suggested.    2. Status post Whipple procedure with moderate pneumobilia, overall unchanged since September 14, 2010.    < end of copied text >

## 2019-04-16 NOTE — CONSULT NOTE ADULT - SUBJECTIVE AND OBJECTIVE BOX
NEPHROLOGY CONSULTATION NOTE    Patient is a 68y Male whom presented to the hospital with nausea, vomiting and abdominal pain.   HPI:  68M PMH HTN, DLD, GERD, PSH panc Ca s/p whipple procedure 2010 presents to the ED c/o 5 DAYS OF n/v and abd pain. Pt states the abdominal pain is focal, located to the mid abdomen, and is mild, nonradiating. Pt states that he maintained a liquid diet for the past 5 days but had green emesis throughout. Pt states that he has had this hernia since his surg in '10 and he can usually "move it and put it back in" and never reports it being "stuck" until now. Pt does, however, describe an episode of similar abdominal pain and "dry heaving" in Feb of this year.     Pt says he was passing gas until this AM. Last BM 2 days ago. Last c-scope 3y ago, last CT scan in Nov 18, both normal. Pt describes intermittent episodes of hematochezia over the past few months which were worked up by his physicians at [facility] [place]; negative EGD, healing on capsule endoscopy, pt does not know exact location. Pt denies hematochezia and hematemesis at this time, along w/ fever, chills, chest pain. (15 Apr 2019 18:53)      PAST MEDICAL & SURGICAL HISTORY:  HLD (hyperlipidemia)  HTN (hypertension)  History of pancreatic surgery    Allergies:  No Known Allergies      Home Medications:  amLODIPine 10 mg oral tablet: 1 tab(s) orally once a day (16 Apr 2019 02:15)  atorvastatin 10 mg oral tablet: 1 tab(s) orally once a day (16 Apr 2019 02:16)  ferrous sulfate 325 mg (65 mg elemental iron) oral delayed release tablet: 1 tab(s) orally every other day (16 Apr 2019 02:18)  lansoprazole 15 mg oral delayed release capsule: 1 cap(s) orally once a day (16 Apr 2019 02:16)  valsartan-hydrochlorothiazide 320mg-25mg oral tablet: 1 tab(s) orally once a day (16 Apr 2019 02:15)  Zenpep 15,000 units-47,000 units-63,000 units oral delayed release capsule: 1 tab(s) orally once a day, As Needed (16 Apr 2019 02:17)      Hospital Medications:   MEDICATIONS  (STANDING):  ciprofloxacin   IVPB      heparin  Injectable 5000 Unit(s) SubCutaneous every 8 hours  lactated ringers. 1000 milliLiter(s) (150 mL/Hr) IV Continuous <Continuous>  metroNIDAZOLE  IVPB      metroNIDAZOLE  IVPB 500 milliGRAM(s) IV Intermittent every 8 hours  pantoprazole  Injectable 40 milliGRAM(s) IV Push daily  potassium chloride  10 mEq/50 mL IVPB 10 milliEquivalent(s) IV Intermittent once  potassium chloride  20 mEq/100 mL IVPB 20 milliEquivalent(s) IV Intermittent once      SOCIAL HISTORY:  Denies ETOH,Smoking,   FAMILY HISTORY:  No pertinent family history in first degree relatives        REVIEW OF SYSTEMS:  CONSTITUTIONAL: No weakness, fevers or chills  EYES/ENT: No visual changes;  No vertigo or throat pain   NECK: No pain or stiffness  RESPIRATORY:   CARDIOVASCULAR:   GASTROINTESTINAL:   GENITOURINARY:   NEUROLOGICAL: No numbness or weakness  SKIN: No itching, burning, rashes, or lesions   VASCULAR:   All other review of systems is negative unless indicated above.    VITALS:  T(F): 99.2 (04-15-19 @ 21:23), Max: 99.4 (04-15-19 @ 13:24)  HR: 100 (04-15-19 @ 22:25)  BP: 104/55 (04-15-19 @ 22:25)  RR: 20 (04-15-19 @ 22:25)  SpO2: 96% (04-15-19 @ 22:25)    04-15 @ 07:01  -  04-16 @ 07:00  --------------------------------------------------------  IN: 2000 mL / OUT: 3240 mL / NET: -1240 mL        I&O's Detail    15 Apr 2019 07:01  -  16 Apr 2019 07:00  --------------------------------------------------------  IN:    IV PiggyBack: 300 mL    Lactated Ringers IV Bolus: 500 mL    lactated ringers.: 450 mL    lactated ringers.: 500 mL    lactated ringers.: 250 mL  Total IN: 2000 mL    OUT:    Nasoenteral Tube: 1960 mL    Voided: 1280 mL  Total OUT: 3240 mL    Total NET: -1240 mL            PHYSICAL EXAM:  Constitutional: NAD  HEENT: anicteric sclera, oropharynx clear, MMM  Neck:  Respiratory:   Cardiovascular:   Gastrointestinal:   Extremities: No cyanosis or clubbing. No peripheral edema  Neurological: A/O x 3, no focal deficits  Psychiatric: Normal mood, normal affect  : No CVA tenderness. No spencer.   Skin: No rashes  Vascular Access:    LABS:  04-16    140  |  96<L>  |  63<HH>  ----------------------------<  116<H>  3.6   |  30  |  3.6<H>    Ca    8.2<L>      16 Apr 2019 04:50  Phos  4.0     04-16  Mg     2.0     04-16    TPro  8.1<H>  /  Alb  4.5  /  TBili  0.9  /  DBili      /  AST  13  /  ALT  12  /  AlkPhos  95  04-15    Creatinine Trend: 3.6 <--, 5.3 <--, 7.0 <--                        13.0   13.39 )-----------( 158      ( 16 Apr 2019 04:50 )             39.9     Urine Studies:              RADIOLOGY & ADDITIONAL STUDIES:    < from: CT Abdomen and Pelvis w/ Oral Cont (04.15.19 @ 17:38) >  IMPRESSION:    1. Moderate gastric distention and multiple dilated loops of small bowel extending to the level of a moderate size paraumbilical hernia containing   a short segment of thickenedsmall bowel, compatible with small bowel obstruction; correlation for signs of incarceration is suggested.    2. Status post Whipple procedure with moderate pneumobilia, overall unchanged since September 14, 2010.    < end of copied text > NEPHROLOGY CONSULTATION NOTE    Patient is a 68y Male whom presented to the hospital with nausea, vomiting and abdominal pain.   HPI:  68M PMH HTN, DLD, GERD, PSH panc Ca s/p whipple procedure 2010 presents to the ED c/o 5 DAYS OF n/v and abd pain. Pt states the abdominal pain is focal, located to the mid abdomen, and is mild, nonradiating. Pt states that he maintained a liquid diet for the past 5 days but had green emesis throughout. Pt states that he has had this hernia since his surg in '10 and he can usually "move it and put it back in" and never reports it being "stuck" until now. Pt does, however, describe an episode of similar abdominal pain and "dry heaving" in Feb of this year.   Pt says he was passing gas until this AM. Last BM 2 days ago. Last c-scope 3y ago, last CT scan in Nov 18, both normal. Pt describes intermittent episodes of hematochezia over the past few months which were worked up by his physicians at [facility] [place]; negative EGD, healing on capsule endoscopy, pt does not know exact location. Pt denies hematochezia and hematemesis at this time, along w/ fever, chills, chest pain. (15 Apr 2019 18:53)    Hospital Course: Patient admitted to SICU for SBO secondary to bowel incarceration evidenced by ct scan and clinical signs/ symptoms.  He was made NPO, s/p NG tube placement and 4 Liters of LR/NS boluses and now on maintenance fluids of LR @150cc/hr. He also received antibiotics, intravenous ciprofloxacin and flagyl.  Plan for surgical intervention this evening.      Nephrology History:  Patient reports having an DAISY ten years ago when he was first diagnosed with pancreatic cancer and he does not remember the etiology.  He reports his kidney function improved soon after and he was never told he had any chronic kidney disease.  He recently had blood work in [facility] and reports his Cr was normal. His son will bring the labs.    PAST MEDICAL & SURGICAL HISTORY:  HLD (hyperlipidemia)  HTN (hypertension)  History of pancreatic surgery    Allergies:  No Known Allergies      Home Medications:  amLODIPine 10 mg oral tablet: 1 tab(s) orally once a day (16 Apr 2019 02:15)  atorvastatin 10 mg oral tablet: 1 tab(s) orally once a day (16 Apr 2019 02:16)  ferrous sulfate 325 mg (65 mg elemental iron) oral delayed release tablet: 1 tab(s) orally every other day (16 Apr 2019 02:18)  lansoprazole 15 mg oral delayed release capsule: 1 cap(s) orally once a day (16 Apr 2019 02:16)  valsartan-hydrochlorothiazide 320mg-25mg oral tablet: 1 tab(s) orally once a day (16 Apr 2019 02:15)  Zenpep 15,000 units-47,000 units-63,000 units oral delayed release capsule: 1 tab(s) orally once a day, As Needed (16 Apr 2019 02:17)      Hospital Medications:   MEDICATIONS  (STANDING):  ciprofloxacin   IVPB      heparin  Injectable 5000 Unit(s) SubCutaneous every 8 hours  lactated ringers. 1000 milliLiter(s) (150 mL/Hr) IV Continuous <Continuous>  metroNIDAZOLE  IVPB      metroNIDAZOLE  IVPB 500 milliGRAM(s) IV Intermittent every 8 hours  pantoprazole  Injectable 40 milliGRAM(s) IV Push daily  potassium chloride  10 mEq/50 mL IVPB 10 milliEquivalent(s) IV Intermittent once  potassium chloride  20 mEq/100 mL IVPB 20 milliEquivalent(s) IV Intermittent once      SOCIAL HISTORY:  Denies ETOH and illicit drug use.   Remote smoking history over 20 years ago.     FAMILY HISTORY:  No pertinent family history in first degree relatives        REVIEW OF SYSTEMS:   CONSTITUTIONAL: No weakness, fevers or chills  EYES/ENT: No visual changes;  No vertigo or throat pain   NECK: No pain or stiffness  RESPIRATORY: No shortness of breath, cough  CARDIOVASCULAR: No chest pain or palpitations  GASTROINTESTINAL: Severe abdominal pain prior to admission now under control. Also admits nausea, vomiting - Green bilious vomiting. No hematemesis, hematochezia.  Admits to one episode of diarrhea a few days ago.   GENITOURINARY: Admits to having very low urinary output for 2 days prior to presentation.  Positive spencer now, placed in ED. Admits to Flank pain  NEUROLOGICAL: No numbness or weakness  SKIN: No itching, burning, rashes, or lesions   All other review of systems is negative unless indicated above.    VITALS:  T(F): 99.2 (04-15-19 @ 21:23), Max: 99.4 (04-15-19 @ 13:24)  HR: 100 (04-15-19 @ 22:25)  BP: 104/55 (04-15-19 @ 22:25)  RR: 20 (04-15-19 @ 22:25)  SpO2: 96% (04-15-19 @ 22:25)    04-15 @ 07:01  -  04-16 @ 07:00  --------------------------------------------------------  IN: 2000 mL / OUT: 3240 mL / NET: -1240 mL        I&O's Detail    15 Apr 2019 07:01  -  16 Apr 2019 07:00  --------------------------------------------------------  IN:    IV PiggyBack: 300 mL    Lactated Ringers IV Bolus: 500 mL    lactated ringers.: 450 mL    lactated ringers.: 500 mL    lactated ringers.: 250 mL  Total IN: 2000 mL    OUT:    Nasoenteral Tube: 1960 mL    Voided: 1280 mL  Total OUT: 3240 mL    Total NET: -1240 mL            PHYSICAL EXAM:  Constitutional: NAD, Resting comfortably  HEENT: anicteric sclera, oropharynx clear, MMM  Neck: No JVD  Respiratory: CTAB, no crackles, wheezes or rubs  Cardiovascular: RRR, S1, S2, tachycardic  Gastrointestinal: soft, tender upon palpation on ventral scar, non distended. Well healed ventral scar. unable to visualize hernia.   Extremities: No cyanosis or clubbing. No peripheral edema  Neurological: A/O x 3, no focal deficits  Psychiatric: Normal mood, normal affect  : No CVA tenderness. Positive Spencer  Skin: No rashes    LABS:  04-16    140  |  96<L>  |  63<HH>  ----------------------------<  116<H>  3.6   |  30  |  3.6<H>    Ca    8.2<L>      16 Apr 2019 04:50  Phos  4.0     04-16  Mg     2.0     04-16    TPro  8.1<H>  /  Alb  4.5  /  TBili  0.9  /  DBili      /  AST  13  /  ALT  12  /  AlkPhos  95  04-15    Creatinine Trend: 3.6 <--, 5.3 <--, 7.0 <--                        13.0   13.39 )-----------( 158      ( 16 Apr 2019 04:50 )             39.9     Urine Studies:              RADIOLOGY & ADDITIONAL STUDIES:    < from: CT Abdomen and Pelvis w/ Oral Cont (04.15.19 @ 17:38) >  IMPRESSION:    1. Moderate gastric distention and multiple dilated loops of small bowel extending to the level of a moderate size paraumbilical hernia containing a short segment of thickened small bowel, compatible with small bowel obstruction; correlation for signs of incarceration is suggested.    2. Status post Whipple procedure with moderate pneumobilia, overall unchanged since September 14, 2010.    < end of copied text > NEPHROLOGY CONSULTATION NOTE    Patient is a 68y Male whom presented to the hospital with nausea, vomiting and abdominal pain.   HPI:  68M PMH HTN, DLD, GERD, PSH panc Ca s/p whipple procedure 2010 presents to the ED c/o 5 DAYS OF n/v and abd pain. Pt states the abdominal pain is focal, located to the mid abdomen, and is mild, nonradiating. Pt states that he maintained a liquid diet for the past 5 days but had green emesis throughout. Pt states that he has had this hernia since his surg in '10 and he can usually "move it and put it back in" and never reports it being "stuck" until now. Pt does, however, describe an episode of similar abdominal pain and "dry heaving" in Feb of this year.   Pt says he was passing gas until this AM. Last BM 2 days ago. Last c-scope 3y ago, last CT scan in Nov 18, both normal. Pt describes intermittent episodes of hematochezia over the past few months which were worked up by his physicians at [facility] [place]; negative EGD, healing on capsule endoscopy, pt does not know exact location. Pt denies hematochezia and hematemesis at this time, along w/ fever, chills, chest pain. (15 Apr 2019 18:53)    Hospital Course: Patient admitted to SICU for SBO secondary to bowel incarceration evidenced by ct scan and clinical signs/ symptoms.  He was made NPO, s/p NG tube placement and 4 Liters of LR/NS boluses and now on maintenance fluids of LR @150cc/hr. He also received antibiotics, intravenous ciprofloxacin and flagyl.  Plan for surgical intervention this evening.      Nephrology History:  Patient reports having an DAISY ten years ago when he was first diagnosed with pancreatic cancer and he does not remember the etiology.  He reports his kidney function improved soon after and he was never told he had any chronic kidney disease.  He recently had blood work in [facility] and reports his Cr was normal. His son will bring the labs.    PAST MEDICAL & SURGICAL HISTORY:  HLD (hyperlipidemia)  HTN (hypertension)  History of pancreatic surgery    Allergies:  No Known Allergies      Home Medications:  amLODIPine 10 mg oral tablet: 1 tab(s) orally once a day (16 Apr 2019 02:15)  atorvastatin 10 mg oral tablet: 1 tab(s) orally once a day (16 Apr 2019 02:16)  ferrous sulfate 325 mg (65 mg elemental iron) oral delayed release tablet: 1 tab(s) orally every other day (16 Apr 2019 02:18)  lansoprazole 15 mg oral delayed release capsule: 1 cap(s) orally once a day (16 Apr 2019 02:16)  valsartan-hydrochlorothiazide 320mg-25mg oral tablet: 1 tab(s) orally once a day (16 Apr 2019 02:15)  Zenpep 15,000 units-47,000 units-63,000 units oral delayed release capsule: 1 tab(s) orally once a day, As Needed (16 Apr 2019 02:17)      Hospital Medications:   MEDICATIONS  (STANDING):  ciprofloxacin   IVPB      heparin  Injectable 5000 Unit(s) SubCutaneous every 8 hours  lactated ringers. 1000 milliLiter(s) (150 mL/Hr) IV Continuous <Continuous>  metroNIDAZOLE  IVPB      metroNIDAZOLE  IVPB 500 milliGRAM(s) IV Intermittent every 8 hours  pantoprazole  Injectable 40 milliGRAM(s) IV Push daily  potassium chloride  10 mEq/50 mL IVPB 10 milliEquivalent(s) IV Intermittent once  potassium chloride  20 mEq/100 mL IVPB 20 milliEquivalent(s) IV Intermittent once      SOCIAL HISTORY:  Denies ETOH and illicit drug use.   Remote smoking history over 20 years ago.     FAMILY HISTORY:  No pertinent family history in first degree relatives        REVIEW OF SYSTEMS:   CONSTITUTIONAL: No weakness, fevers or chills  EYES/ENT: No visual changes;  No vertigo or throat pain   NECK: No pain or stiffness  RESPIRATORY: No shortness of breath, cough  CARDIOVASCULAR: No chest pain or palpitations  GASTROINTESTINAL: Severe abdominal pain prior to admission now under control. Also admits nausea, vomiting - Green bilious vomiting. No hematemesis, hematochezia.  Admits to one episode of diarrhea a few days ago.   GENITOURINARY: Admits to having very low urinary output for 2 days prior to presentation.  Positive spencer now, placed in ED. Admits to Flank pain  NEUROLOGICAL: No numbness or weakness  SKIN: No itching, burning, rashes, or lesions   All other review of systems is negative unless indicated above.    VITALS:  T(F): 99.2 (04-15-19 @ 21:23), Max: 99.4 (04-15-19 @ 13:24)  HR: 100 (04-15-19 @ 22:25)  BP: 104/55 (04-15-19 @ 22:25)  RR: 20 (04-15-19 @ 22:25)  SpO2: 96% (04-15-19 @ 22:25)    04-15 @ 07:01  -  04-16 @ 07:00  --------------------------------------------------------  IN: 2000 mL / OUT: 3240 mL / NET: -1240 mL        I&O's Detail    15 Apr 2019 07:01  -  16 Apr 2019 07:00  --------------------------------------------------------  IN:    IV PiggyBack: 300 mL    Lactated Ringers IV Bolus: 500 mL    lactated ringers.: 450 mL    lactated ringers.: 500 mL    lactated ringers.: 250 mL  Total IN: 2000 mL    OUT:    Nasoenteral Tube: 1960 mL    Voided: 1280 mL  Total OUT: 3240 mL    Total NET: -1240 mL            PHYSICAL EXAM:  Constitutional: NAD, Resting comfortably  HEENT: anicteric sclera, oropharynx clear, MMM  Neck: No JVD  Respiratory: CTAB, no crackles, wheezes or rubs  Cardiovascular: RRR, S1, S2, tachycardic, systolic murmur  Gastrointestinal: soft, tender upon palpation on ventral scar, non distended. Well healed ventral scar. unable to visualize hernia.   Extremities: No cyanosis or clubbing. No peripheral edema  Neurological: A/O x 3, no focal deficits  Psychiatric: Normal mood, normal affect  : No CVA tenderness. Positive Spencer  Skin: No rashes    LABS:  04-16    140  |  96<L>  |  63<HH>  ----------------------------<  116<H>  3.6   |  30  |  3.6<H>    Ca    8.2<L>      16 Apr 2019 04:50  Phos  4.0     04-16  Mg     2.0     04-16    TPro  8.1<H>  /  Alb  4.5  /  TBili  0.9  /  DBili      /  AST  13  /  ALT  12  /  AlkPhos  95  04-15    Creatinine Trend: 3.6 <--, 5.3 <--, 7.0 <--                        13.0   13.39 )-----------( 158      ( 16 Apr 2019 04:50 )             39.9     ABG - ( 16 Apr 2019 04:56 )  pH: 7.45  /  pCO2: 48    /  pO2: 66    / HCO3: 33    / Base Excess: 8.1   /  SaO2: 93          Urine Studies:      RADIOLOGY & ADDITIONAL STUDIES:    < from: CT Abdomen and Pelvis w/ Oral Cont (04.15.19 @ 17:38) >  IMPRESSION:    1. Moderate gastric distention and multiple dilated loops of small bowel extending to the level of a moderate size paraumbilical hernia containing a short segment of thickened small bowel, compatible with small bowel obstruction; correlation for signs of incarceration is suggested.    2. Status post Whipple procedure with moderate pneumobilia, overall unchanged since September 14, 2010.    < end of copied text >

## 2019-04-16 NOTE — PROGRESS NOTE ADULT - ASSESSMENT
ASSESSMENT/PLAN: 68yMale with hx of pancreatic ca s/p whipple in 2010 presents with SBO likely 2/2 to hernia/incarcerated      Neurologic:  Pain control:  - Dilaudid prn      Respiratory:  - No acute issues  - Nc, good oxygen sat  - IS/PT    Cardiovascular:  Hx of HTN:   - No meds on file but reports taking amlodipine, valsartan and a statin  - BP currently soft do will not restart and meds at present    - No other CV issues    Gastrointestinal/Nutrition:  SBO 2/2 to ventral hernia:   - NGT in place to suction  - Strict NPO  - Monitor abdominal exam  - PPI  - Possible OR today    Genitourinary/Renal:  DAISY:  - Cr 7 > 3.6   -Lactate 2.5>0.8  - LR at 150cc/hr  - Hathaway in place, strict I/Os     Hematologic:  - No acute issues  - Monitor H/H, drop in Hg likely due to hemodilution  - Hep Sq    Infectious Disease:  Leukocytosis: 22>17>13   - Monitor  - Cipro(renal dosing)/Flagyl    Endocrine:  - No hx of DM  - FS q6h while NPO        Disposition: SICU ASSESSMENT/PLAN: 68yMale with hx of pancreatic ca s/p whipple in 2010 presents with SBO likely 2/2 to hernia/incarcerated      Neurologic:  Pain control:  - Dilaudid prn      Respiratory:  - No acute issues  - Nc, good oxygen sat  - IS/PT    Cardiovascular:  Hx of HTN:   - No meds on file but reports taking amlodipine, valsartan and a statin  - BP currently soft do will not restart and meds at present    - No other CV dx    Gastrointestinal/Nutrition:  SBO 2/2 to ventral hernia:   - NGT in place to suction  - Strict NPO  - Monitor abdominal exam  - PPI  - OR today    Genitourinary/Renal:  DAISY:  - Cr 7 > 3.6   -Lactate 2.5>0.8  - LR at 150cc/hr  - Hathaway in place, strict I/Os     Hematologic:  - No acute issues  - Monitor H/H, drop in Hg likely due to hemodilution  - Hep Sq    Infectious Disease:  Leukocytosis: 22>17>13   - Monitor  - Cipro(renal dosing)/Flagyl    Endocrine:  - No hx of DM  - FS q6h while NPO        Disposition: SICU ASSESSMENT/PLAN: 68yMale with hx of pancreatic ca s/p whipple in 2010 presents with SBO likely 2/2 to hernia/incarcerated      Neurologic:  Pain control:  - Dilaudid prn      Respiratory:  - No acute issues  - Nc, good oxygen sat  - IS/PT    Cardiovascular:  Hx of HTN:   - No meds on file but reports taking amlodipine, valsartan and a statin  - BP currently soft do will not restart and meds at present    - No other CV dx    Gastrointestinal/Nutrition:  SBO 2/2 to ventral hernia:   - NGT in place to suction  - Strict NPO  - Monitor abdominal exam  - PPI  - OR today    Genitourinary/Renal:  DAISY:  - Cr 7 > 3.6   -Lactate 2.5>0.8  - LR at 150cc/hr  - Hathaway in place, strict I/Os     Hematologic:  - No acute issues  - Monitor H/H, drop in Hg likely due to hemodilution 17>13>13  - Hep Sq    Infectious Disease:  Leukocytosis: 22>17>13   - Monitor  - Cipro(renal dosing)/Flagyl    Endocrine:  - No hx of DM  - FS q6h while NPO    Disposition: SICU

## 2019-04-16 NOTE — CONSULT NOTE ADULT - ASSESSMENT
68yMale with hx of pancreatic cancer s/p whipple in 2010 presents with abdominal pain, nausea, bilious emesis for 5 days.  CT abdomen and pelvis showed evidence of SBO with multiple dilated loops of SB and gastric distention. Concerns for Incarcerated hernia.  Currently pt NPO s/p NG tube to suction with bilious output.  Admission Cr is 7 and last known baseline Cr is 1.06 from May 2017.      1) DAISY on CKD 2  -Etiology likely pre-renal vs. ATN    Plan  -Cr improving with IV fluids  -recommend UA, urine osmolality, Urine sodium, Urine creatinine, Urine urea nitrogen.   -continue with fluid resuscitation.

## 2019-04-16 NOTE — PHARMACOTHERAPY INTERVENTION NOTE - COMMENTS
Spoke with MD regarding a renal dose adjustment for ciprofloxacin. Recommended 200mg every 24 hours since eGFR = 10.

## 2019-04-16 NOTE — PROGRESS NOTE ADULT - ASSESSMENT
Patient with DAISY presented to hospital for n/v, abdominal pain, decreased oral intake, decreased urine output.   PMH HTN, GERD, Pancreatic ca s/p whipple procedure    1. DAISY on CKD 2 - likely pre-renal  Creatinine improving.   Recommend UA, urine osmolality, Urine sodium, Urine creatinine, Urine urea nitrogen.   Renal bladder US  Continue with fluid resuscitation  will follow Patient with DAISY presented to hospital for n/v, abdominal pain, decreased oral intake, decreased urine output.   PMH HTN, GERD, Pancreatic ca s/p whipple procedure    1. DAISY on CKD 2 - likely pre-renal  Creatinine improving with IVF   Recommend UA, urine osmolality, Urine sodium, Urine creatinine, Urine urea nitrogen.   Renal bladder US  Continue with fluid resuscitation  will follow

## 2019-04-16 NOTE — CHART NOTE - NSCHARTNOTEFT_GEN_A_CORE
68M presented with abdominal pain and bilious vomiting for 5 days admitted to the ICU with SBO likely secondary to ventral incarcerated hernia with increased lactate and DAISY.   Pt presents tachycardic without hypotension, afebrile. During this admission, patient's was resuscitated with 4 L IVF bolus and maintained on IVF 150cc/hr. Pt's creatinine trended from 7>2.8, lactate 2.5>.8 and wbc 22 to 13a and tachycardia improved. Hathaway placed with good urine output after resuscitation.       PAST MEDICAL & SURGICAL HISTORY:  AMY (on cpap)  HLD (hyperlipidemia)  HTN (hypertension)  History of pancreatic surgery s/p whipple procedure  GERD      Allergies: No Known Allergies    ASSESSMENT/PLAN: 68yMale with hx of pancreatic ca s/p whipple in 2010 presents with SBO likely 2/2 to incarcerated hernia     Neurologic:  -Pain control: dilaudid prn     Respiratory: on RA saturating well  - AMY on cpap (home cpap brought in)  -no other acute diagnoses  - IS/PT    Cardiovascular:  Hx of HTN:   - home BP meds currently on hold  - No other CV dx    Gastrointestinal/Nutrition:  SBO 2/2 to ventral hernia: pt had small BM and passing small amount of gas  - NGT dc  - Strict NPO  - Serial abdominal exams  - PPI  - OR postponed and will treat conservatively for now    Genitourinary/Renal:  DAISY:  - Cr 7 > 3.6 >2.8  -Lactate 2.5>0.8  - LR at 150cc/hr  - Hathaway in place, strict I/Os     Hematologic:  - No acute issues  - Monitor H/H, drop in Hg likely due to hemodilution 17>13>13  - Hep Sq    Infectious Disease:  Leukocytosis: 22>17>13   - Monitor  - Cipro(renal dosing)/Flagyl    Endocrine:  - No hx of DM  - FS q6h while NPO    Disposition: downgrade 68M presented with abdominal pain and bilious vomiting for 5 days admitted to the ICU with SBO likely secondary to ventral incarcerated hernia with increased lactate and DAISY.   Pt presents tachycardic without hypotension, afebrile. During this admission, patient's was resuscitated with 4 L IVF bolus and maintained on IVF 150cc/hr. Pt's creatinine trended from 7>2.8, lactate 2.5>.8 and wbc 22 to 13a and tachycardia improved. Hathaway placed with good urine output after resuscitation.       PAST MEDICAL & SURGICAL HISTORY:  AMY (on cpap)  HLD (hyperlipidemia)  HTN (hypertension)  History of pancreatic surgery s/p whipple procedure  GERD      Allergies: No Known Allergies    ASSESSMENT/PLAN: 68yMale with hx of pancreatic ca s/p whipple in 2010 presents with SBO likely 2/2 to incarcerated hernia     Neurologic:  -Pain control: dilaudid prn     Respiratory: on RA saturating well  - AMY on cpap (home cpap brought in)  -no other acute diagnoses  - IS/PT    Cardiovascular:  Hx of HTN:   - home BP meds currently on hold  - No other CV dx    Gastrointestinal/Nutrition:  SBO 2/2 to ventral hernia: pt had small BM and passing small amount of gas  - NGT dc  - Strict NPO  - Serial abdominal exams  - PPI  - OR postponed and will treat conservatively for now    Genitourinary/Renal:  DAISY:  - Cr 7 > 3.6 >2.8  -Lactate 2.5>0.8  - LR at 150cc/hr changed to75mm/hr  - Hatahway dc, trial to void pending     Hematologic:  - No acute issues  - Monitor H/H, drop in Hg likely due to hemodilution 17>13>13  - Hep Sq    Infectious Disease:  Leukocytosis: 22>17>13   - Monitor  - Cipro(renal dosing)/Flagyl    Endocrine:  - No hx of DM  - FS q6h while NPO    Disposition: downgrade. Signed out to Dr. Agustin

## 2019-04-17 LAB
ANION GAP SERPL CALC-SCNC: 14 MMOL/L — SIGNIFICANT CHANGE UP (ref 7–14)
APTT BLD: 28.8 SEC — SIGNIFICANT CHANGE UP (ref 27–39.2)
BUN SERPL-MCNC: 37 MG/DL — HIGH (ref 10–20)
CALCIUM SERPL-MCNC: 8.5 MG/DL — SIGNIFICANT CHANGE UP (ref 8.5–10.1)
CHLORIDE SERPL-SCNC: 101 MMOL/L — SIGNIFICANT CHANGE UP (ref 98–110)
CO2 SERPL-SCNC: 29 MMOL/L — SIGNIFICANT CHANGE UP (ref 17–32)
CREAT SERPL-MCNC: 1.6 MG/DL — HIGH (ref 0.7–1.5)
GLUCOSE BLDC GLUCOMTR-MCNC: 89 MG/DL — SIGNIFICANT CHANGE UP (ref 70–99)
GLUCOSE SERPL-MCNC: 98 MG/DL — SIGNIFICANT CHANGE UP (ref 70–99)
HCT VFR BLD CALC: 41.3 % — LOW (ref 42–52)
HGB BLD-MCNC: 13.1 G/DL — LOW (ref 14–18)
INR BLD: 1.22 RATIO — SIGNIFICANT CHANGE UP (ref 0.65–1.3)
MAGNESIUM SERPL-MCNC: 1.9 MG/DL — SIGNIFICANT CHANGE UP (ref 1.8–2.4)
MCHC RBC-ENTMCNC: 26.5 PG — LOW (ref 27–31)
MCHC RBC-ENTMCNC: 31.7 G/DL — LOW (ref 32–37)
MCV RBC AUTO: 83.6 FL — SIGNIFICANT CHANGE UP (ref 80–94)
NRBC # BLD: 0 /100 WBCS — SIGNIFICANT CHANGE UP (ref 0–0)
PHOSPHATE SERPL-MCNC: 2.4 MG/DL — SIGNIFICANT CHANGE UP (ref 2.1–4.9)
PLATELET # BLD AUTO: 137 K/UL — SIGNIFICANT CHANGE UP (ref 130–400)
POTASSIUM SERPL-MCNC: 4.4 MMOL/L — SIGNIFICANT CHANGE UP (ref 3.5–5)
POTASSIUM SERPL-SCNC: 4.4 MMOL/L — SIGNIFICANT CHANGE UP (ref 3.5–5)
PROTHROM AB SERPL-ACNC: 14 SEC — HIGH (ref 9.95–12.87)
RBC # BLD: 4.94 M/UL — SIGNIFICANT CHANGE UP (ref 4.7–6.1)
RBC # FLD: 14.6 % — HIGH (ref 11.5–14.5)
SODIUM SERPL-SCNC: 144 MMOL/L — SIGNIFICANT CHANGE UP (ref 135–146)
WBC # BLD: 9.63 K/UL — SIGNIFICANT CHANGE UP (ref 4.8–10.8)
WBC # FLD AUTO: 9.63 K/UL — SIGNIFICANT CHANGE UP (ref 4.8–10.8)

## 2019-04-17 PROCEDURE — 71045 X-RAY EXAM CHEST 1 VIEW: CPT | Mod: 26

## 2019-04-17 PROCEDURE — 99233 SBSQ HOSP IP/OBS HIGH 50: CPT

## 2019-04-17 PROCEDURE — 74176 CT ABD & PELVIS W/O CONTRAST: CPT | Mod: 26

## 2019-04-17 RX ORDER — IOHEXOL 300 MG/ML
30 INJECTION, SOLUTION INTRAVENOUS ONCE
Qty: 0 | Refills: 0 | Status: COMPLETED | OUTPATIENT
Start: 2019-04-17 | End: 2019-04-17

## 2019-04-17 RX ADMIN — PANTOPRAZOLE SODIUM 40 MILLIGRAM(S): 20 TABLET, DELAYED RELEASE ORAL at 10:38

## 2019-04-17 RX ADMIN — HEPARIN SODIUM 5000 UNIT(S): 5000 INJECTION INTRAVENOUS; SUBCUTANEOUS at 16:13

## 2019-04-17 RX ADMIN — HEPARIN SODIUM 5000 UNIT(S): 5000 INJECTION INTRAVENOUS; SUBCUTANEOUS at 21:26

## 2019-04-17 RX ADMIN — IOHEXOL 30 MILLILITER(S): 300 INJECTION, SOLUTION INTRAVENOUS at 09:26

## 2019-04-17 RX ADMIN — HEPARIN SODIUM 5000 UNIT(S): 5000 INJECTION INTRAVENOUS; SUBCUTANEOUS at 05:30

## 2019-04-17 NOTE — CONSULT NOTE ADULT - ASSESSMENT
68M PMH HTN, DLD, GERD, PSH panc Ca s/p whipple procedure 2010 presents to the ED c/o 5 DAYS OF n/v and abd pain found to have an incarcerated inguinal hernia causing SBO    Pre-op for incarcerated inguinal hernia  -RCRI 1; 6.0 % 30-day risk of death, MI, or cardiac arrest  -hold valsartan in light of concomitant DAISY  -intraoperatively maintain I>O, f/u routine EKG postoperatively to monitor for perioperative cardiac events.  -METS>4, participates/does not participate independently in all ADL's. 68M PMH HTN, DLD, GERD, PSH pancreatic Ca s/p whipple procedure 2010 presents to the ED c/o 5 DAYS OF n/v and abd pain found to have an incarcerated inguinal hernia causing SBO    Pre-op for incarcerated inguinal hernia  -RCRI 1; 6.0 % 30-day risk of death, MI, or cardiac arrest  -hold valsartan in light of concomitant DAISY  -intraoperatively maintain I>O, f/u routine EKG postoperatively to monitor for perioperative cardiac events.  -METS>4, participates/does not participate independently in all ADL's. 68M PMH HTN, DLD, GERD, PSH pancreatic Ca s/p whipple procedure 2010 presents to the ED c/o 5 DAYS OF n/v and abd pain found to have an incarcerated inguinal hernia causing SBO    Pre-op for incarcerated inguinal hernia  -RCRI 2; 10.1 % 30-day risk of death, MI, or cardiac arrest  -no active cardiac decompensation  -EKG(4/16/19): sinus with sinus arrhythmia  -hold valsartan in light of concomitant DAISY  -METS>4, participates independently in all ADL's. 68M PMH HTN, DLD, GERD, PSH pancreatic Ca s/p whipple procedure 2010 presents to the ED c/o 5 DAYS OF n/v and abd pain found to have an incarcerated inguinal hernia causing SBO    Pre-op for incarcerated inguinal hernia  -RCRI 2; 10.1 % 30-day risk of death, MI, or cardiac arrest  -moderate risk for a relatively intermediate risk procedure  -no active cardiac decompensation  -EKG(4/16/19): sinus with sinus arrhythmia  -hold valsartan in light of concomitant DAISY  -METS>4, participates independently in all ADL's.

## 2019-04-17 NOTE — PROVIDER CONTACT NOTE (OTHER) - SITUATION
While doing skin assessment, a rash on the patient's buttock was noted. Pt does not have any itchiness.

## 2019-04-17 NOTE — CONSULT NOTE ADULT - SUBJECTIVE AND OBJECTIVE BOX
Date of Admission: 4/15/19    CHIEF COMPLAINT: nausea, vomiting, abd pain x 5 days    HISTORY OF PRESENT ILLNESS: 68yMale with PMH below presented to the hospital for c/o 5 DAYS OF n/v and abd pain. Pt states the abdominal pain is focal, located to the mid abdomen, and is mild. Nonradiating. Pt states that he maintained a liquid diet for the past 5 days but had green emesis throughout. Pt states that he has had this hernia since his surg in '10 and he can usually "move it and put it back in" and never reports it being "stuck" until now. Pt does, however, describe an episode of similar abdominal pain and "dry heaving" in Feb of this year.     Pt says he was passing gas until this AM. Last BM 2 days ago. Last c-scope 3y ago, last CT scan in Nov 18, both normal. Pt describes intermittent episodes of hematochezia over the past few months which were worked up by his physicians at [facility] [place]; negative EGD, healing on capsule endoscopy, pt does not know exact location. Pt denies hematochezia and hematemesis at this time, along w/ fever, chills, chest pain.    PAST MEDICAL & SURGICAL HISTORY:  HLD (hyperlipidemia)  HTN (hypertension)  History of pancreatic surgery  FAMILY HISTORY:  No pertinent family history in first degree relatives    Allergies  No Known Allergies    Home Medications:  amLODIPine 10 mg oral tablet: 1 tab(s) orally once a day (16 Apr 2019 02:15)  atorvastatin 10 mg oral tablet: 1 tab(s) orally once a day (16 Apr 2019 02:16)  ferrous sulfate 325 mg (65 mg elemental iron) oral delayed release tablet: 1 tab(s) orally every other day (16 Apr 2019 02:18)  lansoprazole 15 mg oral delayed release capsule: 1 cap(s) orally once a day (16 Apr 2019 02:16)  valsartan-hydrochlorothiazide 320mg-25mg oral tablet: 1 tab(s) orally once a day (16 Apr 2019 02:15)  Zenpep 15,000 units-47,000 units-63,000 units oral delayed release capsule: 1 tab(s) orally once a day, As Needed (16 Apr 2019 02:17)    MEDICATIONS  (STANDING):  chlorhexidine 4% Liquid 1 Application(s) Topical <User Schedule>  heparin  Injectable 5000 Unit(s) SubCutaneous every 8 hours  lactated ringers. 1000 milliLiter(s) (75 mL/Hr) IV Continuous <Continuous>  pantoprazole  Injectable 40 milliGRAM(s) IV Push daily    MEDICATIONS  (PRN):      SOCIAL HISTORY:    [ ] Non-smoker  [ ] Smoker  [ ] Alcohol    REVIEW OF SYSTEMS:  CONSTITUTIONAL: No fever, weight loss, or fatigue  CARDIOLOGY: PAtient denies chest pain, shortness of breath or syncopal episodes.   RESPIRATORY: denies shortness of breath, wheezeing.   NEUROLOGICAL: NO weakness, no focal deficits to report.  ENDOCRINOLOGICAL: no recent change in diabetic medications.   GI: no BRBPR, no N,V,diarrhea.    PSYCHIATRY: normal mood and affect  HEENT: no nasal discharge, no ecchymosis  SKIN: no ecchymosis, no breakdown  MUSCULOSKELETAL: Full range of motion x4.      PHYSICAL EXAM:  T(C): 36.7 (04-17-19 @ 07:30), Max: 37.1 (04-16-19 @ 16:00)  HR: 77 (04-17-19 @ 07:30) (77 - 100)  BP: 161/69 (04-17-19 @ 07:30) (111/53 - 161/69)  RR: 20 (04-17-19 @ 07:30) (16 - 20)  SpO2: 97% (04-17-19 @ 07:30) (95% - 97%)  Wt(kg): --  I&O's Summary    16 Apr 2019 07:01  -  17 Apr 2019 07:00  --------------------------------------------------------  IN: 2600 mL / OUT: 2190 mL / NET: 410 mL    17 Apr 2019 07:01  -  17 Apr 2019 12:56  --------------------------------------------------------  IN: 225 mL / OUT: 0 mL / NET: 225 mL    General Appearance: Normal	  Cardiovascular: Normal S1 S2, No JVD, No murmurs, No edema  Respiratory: Lungs clear to auscultation	  Psychiatry: A & O x 3, Mood & affect appropriate  Gastrointestinal:  Soft, Non-tender  Skin: No rashes, No ecchymoses, No cyanosis	  Neurologic: Non-focal  Extremities: Normal range of motion, No clubbing, cyanosis or edema  Vascular: Peripheral pulses palpable 2+ bilaterally    LABS:	 	                          13.1   9.63  )-----------( 137      ( 17 Apr 2019 00:09 )             41.3     04-17    144  |  101  |  37<H>  ----------------------------<  98  4.4   |  29  |  1.6<H>    Ca    8.5      17 Apr 2019 00:09  Phos  2.4     04-17  Mg     1.9     04-17    TPro  8.1<H>  /  Alb  4.5  /  TBili  0.9  /  DBili  x   /  AST  13  /  ALT  12  /  AlkPhos  95  04-15    CARDIAC MARKERS ( 15 Apr 2019 13:10 )  x     / <0.01 ng/mL / x     / x     / x          PT/INR - ( 17 Apr 2019 00:09 )   PT: 14.00 sec;   INR: 1.22 ratio         PTT - ( 17 Apr 2019 00:09 )  PTT:28.8 sec    proBNP:   Lipid Profile:   HgA1c:   TSH:     CARDIAC MARKERS:  Troponin T, Serum (04.15.19 @ 13:10)    Troponin T, Serum: <0.01 ng/mL    TELEMETRY EVENTS: N/A	    ECG:  	  < from: 12 Lead ECG (04.16.19 @ 11:08) >  Diagnosis Line Normal sinus rhythmwith sinus arrhythmia  Possible Anterior infarct , age undetermined  Abnormal ECG    < end of copied text >    < from: 12 Lead ECG (04.15.19 @ 13:21) >  Diagnosis Line Sinus tachycardia with Premature atrial complexes  Possible Left atrial enlargement  Borderline ECG    < end of copied text >    RADIOLOGY:  < from: CT Abdomen and Pelvis w/ Oral Cont (04.15.19 @ 17:38) >  IMPRESSION:    1. Moderate gastric distention and multiple dilated loops of small bowel   extending to the level of a moderate size paraumbilical hernia containing   a short segment of thickenedsmall bowel, compatible with small bowel   obstruction; correlation for signs of incarceration is suggested.    2. Status post Whipple procedure with moderate pneumobilia, overall   unchanged since September 14, 2010.    < end of copied text >    OTHER: 	    PREVIOUS DIAGNOSTIC TESTING:    [ ] Echocardiogram:  [ ] Catheterization:  [ ] Stress Test:  	  	  ASSESSMENT/PLAN: Date of Admission: 4/15/19    CHIEF COMPLAINT: nausea, vomiting, abd pain x 5 days    HISTORY OF PRESENT ILLNESS: 68yMale with PMH below presented to the hospital for c/o 5 DAYS OF n/v and abd pain. Pt states the abdominal pain is focal, located to the mid abdomen, and is mild. Nonradiating. Pt states that he maintained a liquid diet for the past 5 days but had green emesis throughout. Pt states that he has had this hernia since his surg in '10 and he can usually "move it and put it back in" and never reports it being "stuck" until now. Pt does, however, describe an episode of similar abdominal pain and "dry heaving" in Feb of this year.     Pt says he was passing gas until this AM. Last BM 2 days ago. Last c-scope 3y ago, last CT scan in Nov 18, both normal. Pt describes intermittent episodes of hematochezia over the past few months which were worked up by his physicians at [facility] [place]; negative EGD, healing on capsule endoscopy, pt does not know exact location. Pt denies hematochezia and hematemesis at this time, along w/ fever, chills, chest pain.    PAST MEDICAL & SURGICAL HISTORY:  HLD (hyperlipidemia)  HTN (hypertension)  History of pancreatic surgery  FAMILY HISTORY:  No pertinent family history in first degree relatives    Allergies  No Known Allergies    Home Medications:  amLODIPine 10 mg oral tablet: 1 tab(s) orally once a day (16 Apr 2019 02:15)  atorvastatin 10 mg oral tablet: 1 tab(s) orally once a day (16 Apr 2019 02:16)  ferrous sulfate 325 mg (65 mg elemental iron) oral delayed release tablet: 1 tab(s) orally every other day (16 Apr 2019 02:18)  lansoprazole 15 mg oral delayed release capsule: 1 cap(s) orally once a day (16 Apr 2019 02:16)  valsartan-hydrochlorothiazide 320mg-25mg oral tablet: 1 tab(s) orally once a day (16 Apr 2019 02:15)  Zenpep 15,000 units-47,000 units-63,000 units oral delayed release capsule: 1 tab(s) orally once a day, As Needed (16 Apr 2019 02:17)    MEDICATIONS  (STANDING):  chlorhexidine 4% Liquid 1 Application(s) Topical <User Schedule>  heparin  Injectable 5000 Unit(s) SubCutaneous every 8 hours  lactated ringers. 1000 milliLiter(s) (75 mL/Hr) IV Continuous <Continuous>  pantoprazole  Injectable 40 milliGRAM(s) IV Push daily    SOCIAL HISTORY:    [X ] Non-smoker  [ ] Smoker  [ ] Alcohol    REVIEW OF SYSTEMS:  CONSTITUTIONAL: No fever, weight loss, or fatigue  CARDIOLOGY: PAtient denies chest pain, shortness of breath or syncopal episodes.   RESPIRATORY: denies shortness of breath, wheezeing.   NEUROLOGICAL: NO weakness, no focal deficits to report.  ENDOCRINOLOGICAL: no recent change in diabetic medications.   GI: no BRBPR, no N,V,diarrhea.    PSYCHIATRY: normal mood and affect  HEENT: no nasal discharge, no ecchymosis  SKIN: no ecchymosis, no breakdown  MUSCULOSKELETAL: Full range of motion x4.      PHYSICAL EXAM:  T(C): 36.7 (04-17-19 @ 07:30), Max: 37.1 (04-16-19 @ 16:00)  HR: 77 (04-17-19 @ 07:30) (77 - 100)  BP: 161/69 (04-17-19 @ 07:30) (111/53 - 161/69)  RR: 20 (04-17-19 @ 07:30) (16 - 20)  SpO2: 97% (04-17-19 @ 07:30) (95% - 97%)  Wt(kg): --  I&O's Summary    16 Apr 2019 07:01  -  17 Apr 2019 07:00  --------------------------------------------------------  IN: 2600 mL / OUT: 2190 mL / NET: 410 mL    17 Apr 2019 07:01  -  17 Apr 2019 12:56  --------------------------------------------------------  IN: 225 mL / OUT: 0 mL / NET: 225 mL    General Appearance: Normal	  Cardiovascular: Normal S1 S2, No JVD, No murmurs, No edema  Respiratory: Lungs clear to auscultation	  Psychiatry: A & O x 3, Mood & affect appropriate  Gastrointestinal:  Soft, Non-tender  Skin: No rashes, No ecchymoses, No cyanosis	  Neurologic: Non-focal  Extremities: Normal range of motion, No clubbing, cyanosis or edema  Vascular: Peripheral pulses palpable 2+ bilaterally    LABS:	 	                          13.1   9.63  )-----------( 137      ( 17 Apr 2019 00:09 )             41.3     04-17    144  |  101  |  37<H>  ----------------------------<  98  4.4   |  29  |  1.6<H>    Ca    8.5      17 Apr 2019 00:09  Phos  2.4     04-17  Mg     1.9     04-17    TPro  8.1<H>  /  Alb  4.5  /  TBili  0.9  /  DBili  x   /  AST  13  /  ALT  12  /  AlkPhos  95  04-15    CARDIAC MARKERS ( 15 Apr 2019 13:10 )  x     / <0.01 ng/mL / x     / x     / x        PT/INR - ( 17 Apr 2019 00:09 )   PT: 14.00 sec;   INR: 1.22 ratio      PTT - ( 17 Apr 2019 00:09 )  PTT:28.8 sec    proBNP:   Lipid Profile:   HgA1c:   TSH:     CARDIAC MARKERS:  Troponin T, Serum (04.15.19 @ 13:10)   Troponin T, Serum: <0.01 ng/mL    TELEMETRY EVENTS: N/A	    ECG:  	  < from: 12 Lead ECG (04.16.19 @ 11:08) >  Diagnosis Line Normal sinus rhythmwith sinus arrhythmia  Possible Anterior infarct , age undetermined  Abnormal ECG    < end of copied text >    < from: 12 Lead ECG (04.15.19 @ 13:21) >  Diagnosis Line Sinus tachycardia with Premature atrial complexes  Possible Left atrial enlargement  Borderline ECG    < end of copied text >    RADIOLOGY:  < from: CT Abdomen and Pelvis w/ Oral Cont (04.15.19 @ 17:38) >  IMPRESSION:    1. Moderate gastric distention and multiple dilated loops of small bowel   extending to the level of a moderate size paraumbilical hernia containing   a short segment of thickenedsmall bowel, compatible with small bowel   obstruction; correlation for signs of incarceration is suggested.    2. Status post Whipple procedure with moderate pneumobilia, overall   unchanged since September 14, 2010.    < end of copied text >    OTHER: 	    PREVIOUS DIAGNOSTIC TESTING:    [ ] Echocardiogram:  [ ] Catheterization:  [ ] Stress Test:  	  	  ASSESSMENT/PLAN:

## 2019-04-17 NOTE — PROGRESS NOTE ADULT - SUBJECTIVE AND OBJECTIVE BOX
Nephrology progress note    Patient is seen and examined, events over the last 24 h noted .  Denies SOB or abd pain, On IVF and CPAP at night  Allergies:  No Known Allergies    Hospital Medications:   MEDICATIONS  (STANDING):  chlorhexidine 4% Liquid 1 Application(s) Topical <User Schedule>  heparin  Injectable 5000 Unit(s) SubCutaneous every 8 hours  lactated ringers. 1000 milliLiter(s) (75 mL/Hr) IV Continuous <Continuous>  pantoprazole  Injectable 40 milliGRAM(s) IV Push daily        VITALS:  T(F): 98 (04-17-19 @ 07:30), Max: 98.9 (04-16-19 @ 12:00)  HR: 77 (04-17-19 @ 07:30)  BP: 161/69 (04-17-19 @ 07:30)  RR: 20 (04-17-19 @ 07:30)  SpO2: 97% (04-17-19 @ 07:30)  Wt(kg): --    04-15 @ 07:01  -  04-16 @ 07:00  --------------------------------------------------------  IN: 2000 mL / OUT: 3240 mL / NET: -1240 mL    04-16 @ 07:01  -  04-17 @ 07:00  --------------------------------------------------------  IN: 2600 mL / OUT: 2190 mL / NET: 410 mL    04-17 @ 07:01  -  04-17 @ 11:48  --------------------------------------------------------  IN: 225 mL / OUT: 0 mL / NET: 225 mL          PHYSICAL EXAM:  Constitutional: NAD  HEENT: anicteric sclera, oropharynx clear, MMM  Neck: No JVD  Respiratory: CTAB, no wheezes, rales or rhonchi  Cardiovascular: S1, S2, RRR  Gastrointestinal: BS+, soft, NT/ND  Extremities:  No peripheral edema  Neurological: A/O x 3  : No CVA tenderness. No spencer.   Skin: No rashes  Vascular Access:    LABS:  04-17    144  |  101  |  37<H>  ----------------------------<  98  4.4   |  29  |  1.6<H>  Creatinine Trend: 1.6<--, 2.8<--, 3.6<--, 5.3<--, 7.0<--    Ca    8.5      17 Apr 2019 00:09  Phos  2.4     04-17  Mg     1.9     04-17    TPro  8.1<H>  /  Alb  4.5  /  TBili  0.9  /  DBili      /  AST  13  /  ALT  12  /  AlkPhos  95  04-15                          13.1   9.63  )-----------( 137      ( 17 Apr 2019 00:09 )             41.3       Urine Studies:      RADIOLOGY & ADDITIONAL STUDIES:  < from: CT Abdomen and Pelvis w/ Oral Cont (04.15.19 @ 17:38) >  KIDNEYS: No hydronephrosis.    ABDOMINOPELVIC NODES: No enlarged abdominal or pelvic lymph nodes.    PELVIC ORGANS: Collapsed urinary bladder with Spencer catheter in place.    PERITONEUM/MESENTERY/BOWEL: Moderate gastric distention and multiple   dilated loops of small bowel extending to the level of a moderate size   paraumbilical hernia containing a short segment of thickened small bowel,   compatible with small bowel obstruction. There is mild stranding of the   fat within the hernia sac. Collapsed bowel loops are noted to exit the   hernia sac.     BONES/SOFT TISSUES: Multilevel degenerative changes of the spine.    OTHER: Atherosclerotic vascular calcification.      IMPRESSION:    1. Moderate gastric distention and multiple dilated loops of small bowel   extending to the level of a moderate size paraumbilical hernia containing   a short segment of thickenedsmall bowel, compatible with small bowel   obstruction; correlation for signs of incarceration is suggested.    2. Status post Whipple procedure with moderate pneumobilia, overall   unchanged since September 14, 2010.      < end of copied text >

## 2019-04-17 NOTE — PROGRESS NOTE ADULT - SUBJECTIVE AND OBJECTIVE BOX
Progress Note: Trauma Surgery  Patient: EVELIO LLAMAS , 68y (1950)Male   MRN: [id number]  Location: [facility] 011 A  Visit: 04-15-19 Inpatient  Date: 04-17-19 @ 07:38  Hospital Day: 3  Post-op Day: 2    Procedure/Injury: Incarcerated hernia with SBO  Events over 24h: No acute event overnight. No new complaint. Pt was 'ed from ICU yesterday. Cr has been improving. NGT was removed yesterday and spencer was DC'ed. Pt has voided 350 cc so far. Pt is vitally stable. Still NPO, tolerating well. Denies nausea, vomiting, and/or abdominal pain. Ambulating inadequately. Not using Incentive spirometer. Voiding adequately. +Flatus, -Bowel movement.    Vitals: T(F): 98 (04-17-19 @ 07:30), Max: 98.9 (04-16-19 @ 12:00)  HR: 77 (04-17-19 @ 07:30)  BP: 161/69 (04-17-19 @ 07:30) (110/63 - 161/69)  RR: 20 (04-17-19 @ 07:30)  SpO2: 97% (04-17-19 @ 07:30)      Diet: Diet, NPO (04-15-19 @ 20:21)    IV Fluid: lactated ringers. 1000 milliLiter(s) (75 mL/Hr) IV Continuous <Continuous>      In:   04-16-19 @ 07:01  -  04-17-19 @ 07:00  --------------------------------------------------------  IN: 2525 mL      Out:   04-16-19 @ 07:01  -  04-17-19 @ 07:00  --------------------------------------------------------  OUT:    Indwelling Catheter - Urethral: 1340 mL    Nasoenteral Tube: 650 mL    Voided: 200 mL  Total OUT: 2190 mL        Net:   04-16-19 @ 07:01  -  04-17-19 @ 07:00  --------------------------------------------------------  NET: 335 mL    Physical Examination:  General Appearance: NAD, alert and cooperative  HEENT: NCAT, WNL  Heart: S1 and S2. No murmurs. Rhythm is regular  Lungs: Clear to auscultation BL without rales, rhonchi, wheezing, crackles or diminished breath sounds.  Abdomen: Positive bowel sounds. Soft, nondistended, nontender.     Medications: [Standing]  chlorhexidine 4% Liquid 1 Application(s) Topical <User Schedule>  heparin  Injectable 5000 Unit(s) SubCutaneous every 8 hours  lactated ringers. 1000 milliLiter(s) (75 mL/Hr) IV Continuous <Continuous>  pantoprazole  Injectable 40 milliGRAM(s) IV Push daily    Medications:[PRN]    Labs:                        13.1   9.63  )-----------( 137      ( 17 Apr 2019 00:09 )             41.3   04-17    144  |  101  |  37<H>  ----------------------------<  98  4.4   |  29  |  1.6<H>    Ca    8.5      17 Apr 2019 00:09  Phos  2.4     04-17  Mg     1.9     04-17    TPro  8.1<H>  /  Alb  4.5  /  TBili  0.9  /  DBili  x   /  AST  13  /  ALT  12  /  AlkPhos  95  04-15  LIVER FUNCTIONS - ( 15 Apr 2019 13:10 )  Alb: 4.5 g/dL / Pro: 8.1 g/dL / ALK PHOS: 95 U/L / ALT: 12 U/L / AST: 13 U/L / GGT: x         PT/INR - ( 17 Apr 2019 00:09 )   PT: 14.00 sec;   INR: 1.22 ratio         PTT - ( 17 Apr 2019 00:09 )  PTT:28.8 secABG - ( 16 Apr 2019 04:56 )  pH: 7.45  /  pCO2: 48    /  pO2: 66    / HCO3: 33    / Base Excess: 8.1   /  SaO2: 93              CARDIAC MARKERS ( 15 Apr 2019 13:10 )  x     / <0.01 ng/mL / x     / x     / x          Micro/Urine:    Imaging:  None/24h

## 2019-04-17 NOTE — PROGRESS NOTE ADULT - ASSESSMENT
Assessment:  68y Male patient admitted S/P  Incarcerated hernia with SBO, with the above physical exam, labs, and imaging findings.    Plan:  -CT PO and IV contrast  -F/U Medical and cardiology clearances  -If CT A/P is okay, will start clears  -F/U nephrology recommendations  -No need for Abx  -Pain control as needed  -Hemodynamic monitoring as per routine  -Encourage ambulation and incentive spirometer use (10x/hr when awake)  -GI and DVT prophylaxis  -Check and replete CBC and BMP q daily  -Strict input and output monitoring  -Continue current management    Date/Time: 04-17-19 @ 07:38 Assessment:  68y Male patient admitted S/P  Incarcerated hernia with SBO, SIRS with organ dysfunction given DAISY that is now resolving     Plan:  -CT PO and IV contrast  -F/U Medical and cardiology clearances  -If CT A/P is okay, will start clears  -F/U nephrology recommendations  -No need for Abx  -Pain control as needed  -Hemodynamic monitoring as per routine  -Encourage ambulation and incentive spirometer use (10x/hr when awake)  -GI and DVT prophylaxis  -Check and replete CBC and BMP q daily  -Strict input and output monitoring  -Continue current management    Date/Time: 04-17-19 @ 07:38

## 2019-04-17 NOTE — PROGRESS NOTE ADULT - ASSESSMENT
Patient with DAISY presented to hospital for n/v, abdominal pain, decreased oral intake, decreased urine output.   PMH HTN, GERD, Pancreatic ca s/p whipple procedure     Severe DAISY on CKD 2 - pre-renal  Creatinine improving with IVF from 7.0 -> 1.6 mg% today  CT abdomen 4/15 - no hydro, Hathaway d/kaed  cont IVF LR while pt is NPO    Incarcerated hernia with SBO - followed by sx  strict Is and Os  close monitoring of electrolytes    Will sign off  call prn

## 2019-04-18 LAB
ANION GAP SERPL CALC-SCNC: 14 MMOL/L — SIGNIFICANT CHANGE UP (ref 7–14)
APTT BLD: 30 SEC — SIGNIFICANT CHANGE UP (ref 27–39.2)
BASOPHILS # BLD AUTO: 0.03 K/UL — SIGNIFICANT CHANGE UP (ref 0–0.2)
BASOPHILS NFR BLD AUTO: 0.3 % — SIGNIFICANT CHANGE UP (ref 0–1)
BLD GP AB SCN SERPL QL: SIGNIFICANT CHANGE UP
BUN SERPL-MCNC: 20 MG/DL — SIGNIFICANT CHANGE UP (ref 10–20)
CALCIUM SERPL-MCNC: 8.5 MG/DL — SIGNIFICANT CHANGE UP (ref 8.5–10.1)
CHLORIDE SERPL-SCNC: 102 MMOL/L — SIGNIFICANT CHANGE UP (ref 98–110)
CO2 SERPL-SCNC: 27 MMOL/L — SIGNIFICANT CHANGE UP (ref 17–32)
CREAT SERPL-MCNC: 0.9 MG/DL — SIGNIFICANT CHANGE UP (ref 0.7–1.5)
EOSINOPHIL # BLD AUTO: 0.29 K/UL — SIGNIFICANT CHANGE UP (ref 0–0.7)
EOSINOPHIL NFR BLD AUTO: 3.1 % — SIGNIFICANT CHANGE UP (ref 0–8)
GLUCOSE BLDC GLUCOMTR-MCNC: 103 MG/DL — HIGH (ref 70–99)
GLUCOSE BLDC GLUCOMTR-MCNC: 116 MG/DL — HIGH (ref 70–99)
GLUCOSE BLDC GLUCOMTR-MCNC: 130 MG/DL — HIGH (ref 70–99)
GLUCOSE BLDC GLUCOMTR-MCNC: 78 MG/DL — SIGNIFICANT CHANGE UP (ref 70–99)
GLUCOSE BLDC GLUCOMTR-MCNC: 91 MG/DL — SIGNIFICANT CHANGE UP (ref 70–99)
GLUCOSE SERPL-MCNC: 87 MG/DL — SIGNIFICANT CHANGE UP (ref 70–99)
HCT VFR BLD CALC: 39.6 % — LOW (ref 42–52)
HGB BLD-MCNC: 12.5 G/DL — LOW (ref 14–18)
IMM GRANULOCYTES NFR BLD AUTO: 0.3 % — SIGNIFICANT CHANGE UP (ref 0.1–0.3)
INR BLD: 1.34 RATIO — HIGH (ref 0.65–1.3)
LYMPHOCYTES # BLD AUTO: 1.24 K/UL — SIGNIFICANT CHANGE UP (ref 1.2–3.4)
LYMPHOCYTES # BLD AUTO: 13.1 % — LOW (ref 20.5–51.1)
MAGNESIUM SERPL-MCNC: 1.8 MG/DL — SIGNIFICANT CHANGE UP (ref 1.8–2.4)
MCHC RBC-ENTMCNC: 26.4 PG — LOW (ref 27–31)
MCHC RBC-ENTMCNC: 31.6 G/DL — LOW (ref 32–37)
MCV RBC AUTO: 83.7 FL — SIGNIFICANT CHANGE UP (ref 80–94)
MONOCYTES # BLD AUTO: 0.74 K/UL — HIGH (ref 0.1–0.6)
MONOCYTES NFR BLD AUTO: 7.8 % — SIGNIFICANT CHANGE UP (ref 1.7–9.3)
NEUTROPHILS # BLD AUTO: 7.14 K/UL — HIGH (ref 1.4–6.5)
NEUTROPHILS NFR BLD AUTO: 75.4 % — HIGH (ref 42.2–75.2)
NRBC # BLD: 0 /100 WBCS — SIGNIFICANT CHANGE UP (ref 0–0)
PHOSPHATE SERPL-MCNC: 2 MG/DL — LOW (ref 2.1–4.9)
PLATELET # BLD AUTO: 131 K/UL — SIGNIFICANT CHANGE UP (ref 130–400)
POTASSIUM SERPL-MCNC: 4.6 MMOL/L — SIGNIFICANT CHANGE UP (ref 3.5–5)
POTASSIUM SERPL-SCNC: 4.6 MMOL/L — SIGNIFICANT CHANGE UP (ref 3.5–5)
PROTHROM AB SERPL-ACNC: 15.4 SEC — HIGH (ref 9.95–12.87)
RBC # BLD: 4.73 M/UL — SIGNIFICANT CHANGE UP (ref 4.7–6.1)
RBC # FLD: 14.1 % — SIGNIFICANT CHANGE UP (ref 11.5–14.5)
SODIUM SERPL-SCNC: 143 MMOL/L — SIGNIFICANT CHANGE UP (ref 135–146)
TYPE + AB SCN PNL BLD: SIGNIFICANT CHANGE UP
WBC # BLD: 9.47 K/UL — SIGNIFICANT CHANGE UP (ref 4.8–10.8)
WBC # FLD AUTO: 9.47 K/UL — SIGNIFICANT CHANGE UP (ref 4.8–10.8)

## 2019-04-18 PROCEDURE — 99233 SBSQ HOSP IP/OBS HIGH 50: CPT | Mod: 57

## 2019-04-18 RX ORDER — SODIUM CHLORIDE 9 MG/ML
1000 INJECTION INTRAMUSCULAR; INTRAVENOUS; SUBCUTANEOUS
Qty: 0 | Refills: 0 | Status: DISCONTINUED | OUTPATIENT
Start: 2019-04-18 | End: 2019-04-19

## 2019-04-18 RX ADMIN — HEPARIN SODIUM 5000 UNIT(S): 5000 INJECTION INTRAVENOUS; SUBCUTANEOUS at 05:29

## 2019-04-18 RX ADMIN — HEPARIN SODIUM 5000 UNIT(S): 5000 INJECTION INTRAVENOUS; SUBCUTANEOUS at 14:31

## 2019-04-18 RX ADMIN — PANTOPRAZOLE SODIUM 40 MILLIGRAM(S): 20 TABLET, DELAYED RELEASE ORAL at 11:52

## 2019-04-18 RX ADMIN — SODIUM CHLORIDE 75 MILLILITER(S): 9 INJECTION INTRAMUSCULAR; INTRAVENOUS; SUBCUTANEOUS at 23:38

## 2019-04-18 RX ADMIN — Medication 62.5 MILLIMOLE(S): at 05:29

## 2019-04-18 RX ADMIN — HEPARIN SODIUM 5000 UNIT(S): 5000 INJECTION INTRAVENOUS; SUBCUTANEOUS at 21:05

## 2019-04-18 RX ADMIN — CHLORHEXIDINE GLUCONATE 1 APPLICATION(S): 213 SOLUTION TOPICAL at 05:29

## 2019-04-18 NOTE — PRE-ANESTHESIA EVALUATION ADULT - NSANTHOSAYNRD_GEN_A_CORE
No. AMY screening performed.  STOP BANG Legend: 0-2 = LOW Risk; 3-4 = INTERMEDIATE Risk; 5-8 = HIGH Risk Yes

## 2019-04-18 NOTE — PRE-ANESTHESIA EVALUATION ADULT - NSANTHPMHFT_GEN_ALL_CORE
Hx of Pancreatic CA s/p Whipple procedure 2010  Partial SBO  Hx DAISY - resolving Hx of Pancreatic CA s/p Whipple procedure 2010  Partial SBO  Hx DAISY - resolving  Hx GI bleed 2014 & 2017, s/p Colonoscopy/ EGD  Sleep Apnea - on CPAP

## 2019-04-18 NOTE — PROGRESS NOTE ADULT - SUBJECTIVE AND OBJECTIVE BOX
Progress Note: Surgery  Patient: EVELIO LLAMAS , 68y (1950)Male   MRN: [id number]  Location: [address]  Visit: 04-15-19 Inpatient  Date: 04-18-19 @ 05:26    Procedure/Injury: Incarcerated ventral hernia     Events over 24h: KHUSHBU overnight, Cr is now 0.9, CT yesterday showed contrast through large bowel and what was called partial SBO, patient is tolerating CLD, passing gas and small bowel movemnts     Vitals: T(F): 98.4 (04-18-19 @ 00:00), Max: 98.4 (04-18-19 @ 00:00)  HR: 72 (04-18-19 @ 00:00)  BP: 135/57 (04-18-19 @ 00:00) (135/57 - 161/69)  RR: 20 (04-18-19 @ 00:00)  SpO2: 97% (04-17-19 @ 07:30)    Diet: Diet, Clear Liquid (04-17-19 @ 15:32)      Bowel function:   Bowel movement [x]   Flatus [x]    Out:   04-16-19 @ 07:01  -  04-17-19 @ 07:00  --------------------------------------------------------  OUT:    Indwelling Catheter - Urethral: 1340 mL    Nasoenteral Tube: 650 mL    Voided: 200 mL  Total OUT: 2190 mL      04-17-19 @ 07:01  -  04-18-19 @ 05:26  --------------------------------------------------------  OUT:    Voided: 475 mL  Total OUT: 475 mL      Physical Examination:  General Appearance: NAD, alert and cooperative  HEENT: NCAT, WNL  Heart: S1 and S2. No murmurs. Rhythm is regular  Lungs: Clear to auscultation BL without rales, rhonchi, wheezing, crackles or diminished breath sounds.  Abdomen: Positive bowel sounds. Soft, nondistended, nontender.     Medications: [Standing]  chlorhexidine 4% Liquid 1 Application(s) Topical <User Schedule>  heparin  Injectable 5000 Unit(s) SubCutaneous every 8 hours  pantoprazole  Injectable 40 milliGRAM(s) IV Push daily  sodium phosphate IVPB 15 milliMole(s) IV Intermittent once    Medications:[PRN]    Labs:                        12.5   9.47  )-----------( 131      ( 18 Apr 2019 00:45 )             39.6     04-18    143  |  102  |  20  ----------------------------<  87  4.6   |  27  |  0.9    Ca    8.5      18 Apr 2019 00:45  Phos  2.0     04-18  Mg     1.8     04-18    PT/INR - ( 18 Apr 2019 00:45 )   PT: 15.40 sec;   INR: 1.34 ratio    PTT - ( 18 Apr 2019 00:45 )  PTT:30.0 sec    Imaging:  < from: CT Abdomen and Pelvis w/ Oral Cont (04.17.19 @ 12:02) >  IMPRESSION:    Persistent dilatation of small bowel loops proximal to a periumbilical   hernia containing short segment of small bowel, without significant   change since 4/15/2019. There is transit of enteric contrast through the   small bowel distal to the hernia and throughout the colon. Findings   consistent with partial small bowel obstruction.    Otherwise stable examination.    < end of copied text >      Date/Time: 04-18-19 @ 05:26

## 2019-04-18 NOTE — PRE-ANESTHESIA EVALUATION ADULT - NSPROPOSEDPROCEDFT_GEN_ALL_CORE
Diadnostic Laparoscopy, possible robotic assisted bowel resection; Incisional hernia repair Diagnostic Laparoscopy, possible robotic assisted bowel resection; Incisional hernia repair

## 2019-04-18 NOTE — PROGRESS NOTE ADULT - ASSESSMENT
Assessment:  68y Male patient admitted S/P  Incarcerated hernia with SBO, with the above physical exam, labs, and imaging findings.    Plan:  -F/U Medical and cardiology clearances  -F/U nephrology recommendations  -Pain control as needed  -Encourage ambulation and incentive spirometer use (10x/hr when awake)  -GI and DVT prophylaxis  -Strict input and output monitoring  -Continue current management

## 2019-04-18 NOTE — PRE-ANESTHESIA EVALUATION ADULT - NSWEIGHTCALCTOOLDRUG_GEN_A_CORE
2 WEEK  CHECK    Nursing notes reviewed and accepted.    Nathan Menjivar is a 2 week old female who presents for 2 week well child exam.  Patient presents with Mother.    Concerns raised today include: umbilical partially fell off    Feeding:  15 minutes per side and bottle EBM 2 oz every 2 hours  Umbilical stump: normal  Urinary stream is strong.  Water Supply:  none  Sleeping: No sleep or behavioral concerns.  Elimination:  Normal wet diapers and bowel movements.    SOCIAL:  Primary caretakers: mom and dad  Concern for safety or violence in the home:  No  Support at home:  Yes  Smoke exposure: outside home - Dad  Any concerns for post-partum depression or anxiety:  No    DEVELOPMENT:  responds to bright light, responds to voice, moves arms and legs equally, raises head slightly when prone and cries to display discomfort    Birth history, medical history, surgical history, and family history reviewed and updated.    [place]  Screen: Normal     REVIEW OF SYSTEMS see HPI; otherwise denies HEENT, NECK, RESP, CARDIAC, GI, , NEURO or PSYCH Sx    PHYSICAL EXAM:  Temperature 98.3 °F (36.8 °C), temperature source Axillary, height 20.47\" (52 cm), weight 3.955 kg, head circumference 35 cm (13.78\").  GEN:  Well appearing  female, nontoxic, no acute distress.  Alert and     interactive.  SKIN: Warm, normal turgor.  No cyanosis.  No bruises or lesions.  HEAD:  Normocephalic, atraumatic.  Anterior fontanelle open, soft and flat.  EYES:  Conjunctiva appear normal with neither icterus nor subconjunctival hemorrhage.  PERRL, EOMI, positive red reflex bilaterally.  NOSE:  Appears normal, no flaring.  EARS:  Normal pinnae, no preauricular skin tags or pit.  TMs are transparent with good    landmarks.  THROAT:  Oropharynx with moist mucous membranes and no lesions.  NECK:  Supple, no lymphadenopathy or masses.  HEART:  Regular rate and rhythm.  Quiet precordium.  Normal S1, S2.   No murmurs, rubs, gallops.   LUNGS:  Clear to auscultation bilaterally.  No wheezes, rales, rhonchi.  Normal work of breathing.  ABD:  Umbilical stump is normal and second scab present.  Soft, nontender.  No organomegaly or masses.  :  normal female genitalia, no labial adhesions or lesions  MSK:  Hips within normal range of motion.  Negative Ford, Ortolani.  Spine straight.  Normal sacrum.  EXT:  Warm, dry, without abnormalities.  NEURO:  Normal tone, bulk, strength.    ASSESSMENT:  2 week old female well infant.    PLAN:  All parental concerns and questions discussed.  Anticipatory guidance provide and handout given.              Fever management              Sleep management              SIDS prevention/back to sleep              Accident prevention: carseat, crib, water heater temperature              Feeding on demand   Vitamin D:  Recommended.  Mom taking.   Umbilical/foreskin care   Caregiver Support              Tobacco-free home   Vaccines discussed  Questions addressed.      Return for 2 month well infant exam or sooner for illness/concerns.        Birgit Dunbar MD   2018  11:58 AM             used

## 2019-04-19 ENCOUNTER — RESULT REVIEW (OUTPATIENT)
Age: 69
End: 2019-04-19

## 2019-04-19 LAB
ANION GAP SERPL CALC-SCNC: 13 MMOL/L — SIGNIFICANT CHANGE UP (ref 7–14)
BASOPHILS # BLD AUTO: 0.03 K/UL — SIGNIFICANT CHANGE UP (ref 0–0.2)
BASOPHILS NFR BLD AUTO: 0.3 % — SIGNIFICANT CHANGE UP (ref 0–1)
BUN SERPL-MCNC: 10 MG/DL — SIGNIFICANT CHANGE UP (ref 10–20)
CALCIUM SERPL-MCNC: 8.4 MG/DL — LOW (ref 8.5–10.1)
CHLORIDE SERPL-SCNC: 101 MMOL/L — SIGNIFICANT CHANGE UP (ref 98–110)
CO2 SERPL-SCNC: 24 MMOL/L — SIGNIFICANT CHANGE UP (ref 17–32)
CREAT SERPL-MCNC: 0.8 MG/DL — SIGNIFICANT CHANGE UP (ref 0.7–1.5)
EOSINOPHIL # BLD AUTO: 0.2 K/UL — SIGNIFICANT CHANGE UP (ref 0–0.7)
EOSINOPHIL NFR BLD AUTO: 2.2 % — SIGNIFICANT CHANGE UP (ref 0–8)
GLUCOSE BLDC GLUCOMTR-MCNC: 110 MG/DL — HIGH (ref 70–99)
GLUCOSE BLDC GLUCOMTR-MCNC: 125 MG/DL — HIGH (ref 70–99)
GLUCOSE SERPL-MCNC: 125 MG/DL — HIGH (ref 70–99)
HCT VFR BLD CALC: 39 % — LOW (ref 42–52)
HGB BLD-MCNC: 12.9 G/DL — LOW (ref 14–18)
IMM GRANULOCYTES NFR BLD AUTO: 0.6 % — HIGH (ref 0.1–0.3)
LYMPHOCYTES # BLD AUTO: 0.79 K/UL — LOW (ref 1.2–3.4)
LYMPHOCYTES # BLD AUTO: 8.5 % — LOW (ref 20.5–51.1)
MAGNESIUM SERPL-MCNC: 1.7 MG/DL — LOW (ref 1.8–2.4)
MCHC RBC-ENTMCNC: 26.7 PG — LOW (ref 27–31)
MCHC RBC-ENTMCNC: 33.1 G/DL — SIGNIFICANT CHANGE UP (ref 32–37)
MCV RBC AUTO: 80.6 FL — SIGNIFICANT CHANGE UP (ref 80–94)
MONOCYTES # BLD AUTO: 0.67 K/UL — HIGH (ref 0.1–0.6)
MONOCYTES NFR BLD AUTO: 7.2 % — SIGNIFICANT CHANGE UP (ref 1.7–9.3)
NEUTROPHILS # BLD AUTO: 7.51 K/UL — HIGH (ref 1.4–6.5)
NEUTROPHILS NFR BLD AUTO: 81.2 % — HIGH (ref 42.2–75.2)
NRBC # BLD: 0 /100 WBCS — SIGNIFICANT CHANGE UP (ref 0–0)
PHOSPHATE SERPL-MCNC: 2.4 MG/DL — SIGNIFICANT CHANGE UP (ref 2.1–4.9)
PLATELET # BLD AUTO: 145 K/UL — SIGNIFICANT CHANGE UP (ref 130–400)
POTASSIUM SERPL-MCNC: 4.1 MMOL/L — SIGNIFICANT CHANGE UP (ref 3.5–5)
POTASSIUM SERPL-SCNC: 4.1 MMOL/L — SIGNIFICANT CHANGE UP (ref 3.5–5)
RBC # BLD: 4.84 M/UL — SIGNIFICANT CHANGE UP (ref 4.7–6.1)
RBC # FLD: 13.9 % — SIGNIFICANT CHANGE UP (ref 11.5–14.5)
SODIUM SERPL-SCNC: 138 MMOL/L — SIGNIFICANT CHANGE UP (ref 135–146)
WBC # BLD: 9.26 K/UL — SIGNIFICANT CHANGE UP (ref 4.8–10.8)
WBC # FLD AUTO: 9.26 K/UL — SIGNIFICANT CHANGE UP (ref 4.8–10.8)

## 2019-04-19 PROCEDURE — 99233 SBSQ HOSP IP/OBS HIGH 50: CPT | Mod: 57

## 2019-04-19 PROCEDURE — 49653: CPT

## 2019-04-19 PROCEDURE — 88302 TISSUE EXAM BY PATHOLOGIST: CPT | Mod: 26

## 2019-04-19 PROCEDURE — 49655: CPT

## 2019-04-19 PROCEDURE — S2900 ROBOTIC SURGICAL SYSTEM: CPT | Mod: NC

## 2019-04-19 PROCEDURE — 15734 MUSCLE-SKIN GRAFT TRUNK: CPT

## 2019-04-19 RX ORDER — HYDROMORPHONE HYDROCHLORIDE 2 MG/ML
0.5 INJECTION INTRAMUSCULAR; INTRAVENOUS; SUBCUTANEOUS
Qty: 0 | Refills: 0 | Status: DISCONTINUED | OUTPATIENT
Start: 2019-04-20 | End: 2019-04-20

## 2019-04-19 RX ORDER — MEPERIDINE HYDROCHLORIDE 50 MG/ML
12.5 INJECTION INTRAMUSCULAR; INTRAVENOUS; SUBCUTANEOUS
Qty: 0 | Refills: 0 | Status: DISCONTINUED | OUTPATIENT
Start: 2019-04-20 | End: 2019-04-20

## 2019-04-19 RX ORDER — CHLORHEXIDINE GLUCONATE 213 G/1000ML
1 SOLUTION TOPICAL
Qty: 0 | Refills: 0 | Status: DISCONTINUED | OUTPATIENT
Start: 2019-04-19 | End: 2019-04-22

## 2019-04-19 RX ORDER — HEPARIN SODIUM 5000 [USP'U]/ML
5000 INJECTION INTRAVENOUS; SUBCUTANEOUS EVERY 8 HOURS
Qty: 0 | Refills: 0 | Status: DISCONTINUED | OUTPATIENT
Start: 2019-04-19 | End: 2019-04-22

## 2019-04-19 RX ORDER — HYDROMORPHONE HYDROCHLORIDE 2 MG/ML
1 INJECTION INTRAMUSCULAR; INTRAVENOUS; SUBCUTANEOUS
Qty: 0 | Refills: 0 | Status: DISCONTINUED | OUTPATIENT
Start: 2019-04-20 | End: 2019-04-20

## 2019-04-19 RX ORDER — SODIUM CHLORIDE 9 MG/ML
1000 INJECTION, SOLUTION INTRAVENOUS
Qty: 0 | Refills: 0 | Status: DISCONTINUED | OUTPATIENT
Start: 2019-04-19 | End: 2019-04-20

## 2019-04-19 RX ORDER — PANTOPRAZOLE SODIUM 20 MG/1
40 TABLET, DELAYED RELEASE ORAL DAILY
Qty: 0 | Refills: 0 | Status: DISCONTINUED | OUTPATIENT
Start: 2019-04-19 | End: 2019-04-22

## 2019-04-19 RX ORDER — ACETAMINOPHEN 500 MG
1000 TABLET ORAL ONCE
Qty: 0 | Refills: 0 | Status: DISCONTINUED | OUTPATIENT
Start: 2019-04-19 | End: 2019-04-19

## 2019-04-19 RX ORDER — BUPIVACAINE 13.3 MG/ML
20 INJECTION, SUSPENSION, LIPOSOMAL INFILTRATION ONCE
Qty: 0 | Refills: 0 | Status: DISCONTINUED | OUTPATIENT
Start: 2019-04-19 | End: 2019-04-19

## 2019-04-19 RX ORDER — METOCLOPRAMIDE HCL 10 MG
10 TABLET ORAL ONCE
Qty: 0 | Refills: 0 | Status: DISCONTINUED | OUTPATIENT
Start: 2019-04-20 | End: 2019-04-20

## 2019-04-19 RX ADMIN — HEPARIN SODIUM 5000 UNIT(S): 5000 INJECTION INTRAVENOUS; SUBCUTANEOUS at 06:34

## 2019-04-19 RX ADMIN — CHLORHEXIDINE GLUCONATE 1 APPLICATION(S): 213 SOLUTION TOPICAL at 06:35

## 2019-04-19 NOTE — PROGRESS NOTE ADULT - ASSESSMENT
Assessment:  68y Male patient admitted S/P  Incarcerated hernia with SBO, with the above physical exam, labs, and imaging findings.    Plan:  -OR today for repair of incisional hernia   -Pain control as needed  -Encourage ambulation and incentive spirometer use (10x/hr when awake)  -GI and DVT prophylaxis  -Strict input and output monitoring  -Continue current management

## 2019-04-19 NOTE — PROGRESS NOTE ADULT - SUBJECTIVE AND OBJECTIVE BOX
Progress Note: Surgery  Patient: EVELIO LLAMAS , 68y (1950)Male   MRN: [id number]  Location: [address]  Visit: 04-15-19 Inpatient  Date: 04-19-19 @ 00:53    Procedure/Injury: Incarcerated ventral hernia     Events over 24h: KHUSHBU overnight, Cr has normalized, patient is tolerating CLD but now NPO for the OR today, passing gas and small bowel movemnts     Vitals: T(F): 97.7 (04-19-19 @ 00:00), Max: 97.8 (04-18-19 @ 07:14)  HR: 82 (04-19-19 @ 00:00)  BP: 131/73 (04-19-19 @ 00:00) (130/65 - 135/66)  RR: 18 (04-19-19 @ 00:00)  SpO2: --    Diet: Diet, Clear Liquid (04-17-19 @ 15:32)  Diet, NPO after Midnight:      NPO Start Date: 18-Apr-2019,   NPO Start Time: 23:59  Except Medications (04-18-19 @ 10:07)      Bowel function:   Bowel movement [x]   Flatus [x]    Out:   04-17-19 @ 07:01  -  04-18-19 @ 07:00  --------------------------------------------------------  OUT:    Voided: 475 mL  Total OUT: 475 mL      04-18-19 @ 07:01  -  04-19-19 @ 00:53  --------------------------------------------------------  OUT:    Voided: 1725 mL  Total OUT: 1725 mL    Physical Examination:  General Appearance: NAD, alert and cooperative  HEENT: NCAT, WNL  Heart: S1 and S2. No murmurs. Rhythm is regular  Lungs: Clear to auscultation BL without rales, rhonchi, wheezing, crackles or diminished breath sounds.  Abdomen: Positive bowel sounds. Soft, nondistended, nontender. large hernia defect palpable     Medications: [Standing]  chlorhexidine 4% Liquid 1 Application(s) Topical <User Schedule>  heparin  Injectable 5000 Unit(s) SubCutaneous every 8 hours  pantoprazole  Injectable 40 milliGRAM(s) IV Push daily  sodium chloride 0.9%. 1000 milliLiter(s) (75 mL/Hr) IV Continuous <Continuous>    Medications:[PRN]    Labs:                        12.5   9.47  )-----------( 131      ( 18 Apr 2019 00:45 )             39.6     04-18    143  |  102  |  20  ----------------------------<  87  4.6   |  27  |  0.9    Ca    8.5      18 Apr 2019 00:45  Phos  2.0     04-18  Mg     1.8     04-18    PT/INR - ( 18 Apr 2019 00:45 )   PT: 15.40 sec;   INR: 1.34 ratio    PTT - ( 18 Apr 2019 00:45 )  PTT:30.0 sec    Imaging:  < from: CT Abdomen and Pelvis w/ Oral Cont (04.17.19 @ 12:02) >  IMPRESSION:    Persistent dilatation of small bowel loops proximal to a periumbilical   hernia containing short segment of small bowel, without significant   change since 4/15/2019. There is transit of enteric contrast through the   small bowel distal to the hernia and throughout the colon. Findings   consistent with partial small bowel obstruction.    Otherwise stable examination.      < end of copied text >    Date/Time: 04-19-19 @ 00:53

## 2019-04-19 NOTE — PRE-ANESTHESIA EVALUATION ADULT - MALLAMPATI CLASS
Class III - visualization of the soft palate and the base of the uvula
Class III - visualization of the soft palate and the base of the uvula
Class II - visualization of the soft palate, fauces, and uvula

## 2019-04-19 NOTE — DIETITIAN INITIAL EVALUATION ADULT. - ORAL INTAKE PTA
Pt is NOT a vegetarian but prefers to eat like vegetarians at times because he aims mostly vegetables based foods but sometimes will have eggs, fish, and chicken. takes MVI, NKFA. Iron supplement. b12 once a week/good

## 2019-04-19 NOTE — DIETITIAN INITIAL EVALUATION ADULT. - OTHER INFO
Pmhx s/p vandana 2010, now p/w 5 days of n/v + abd pain. found w/ incarcerated hernia w/ SBO. Sx following. OR for repair. pt is tolerating clear liquids, passed gas and small BM.

## 2019-04-19 NOTE — BRIEF OPERATIVE NOTE - NSICDXBRIEFPROCEDURE_GEN_ALL_CORE_FT
PROCEDURES:  Ventral herniorrhaphy using component separation technique 19-Apr-2019 23:22:36  Raphael Puri  Robot-assisted incisional herniorrhaphy 19-Apr-2019 23:22:18  Raphael Puri PROCEDURES:  Block, transverse abdominis plane (TAP) 22-Apr-2019 06:43:51  Kevin Gaspar  Enterolysis, laparoscopic 22-Apr-2019 06:43:27  Kevin Gaspar  Ventral herniorrhaphy using component separation technique 19-Apr-2019 23:22:36  Raphael Puri  Robot-assisted incisional herniorrhaphy 19-Apr-2019 23:22:18  Raphael Puri

## 2019-04-20 LAB
ANION GAP SERPL CALC-SCNC: 13 MMOL/L — SIGNIFICANT CHANGE UP (ref 7–14)
ANISOCYTOSIS BLD QL: SLIGHT — SIGNIFICANT CHANGE UP
BASE EXCESS BLDA CALC-SCNC: -0.3 MMOL/L — SIGNIFICANT CHANGE UP (ref -2–2)
BASE EXCESS BLDA CALC-SCNC: -0.6 MMOL/L — SIGNIFICANT CHANGE UP (ref -2–2)
BASE EXCESS BLDA CALC-SCNC: 1.2 MMOL/L — SIGNIFICANT CHANGE UP (ref -2–2)
BASOPHILS # BLD AUTO: 0 K/UL — SIGNIFICANT CHANGE UP (ref 0–0.2)
BASOPHILS NFR BLD AUTO: 0 % — SIGNIFICANT CHANGE UP (ref 0–1)
BUN SERPL-MCNC: 10 MG/DL — SIGNIFICANT CHANGE UP (ref 10–20)
BURR CELLS BLD QL SMEAR: PRESENT — SIGNIFICANT CHANGE UP
BURR CELLS BLD QL SMEAR: SIGNIFICANT CHANGE UP
CALCIUM SERPL-MCNC: 7.6 MG/DL — LOW (ref 8.5–10.1)
CHLORIDE SERPL-SCNC: 103 MMOL/L — SIGNIFICANT CHANGE UP (ref 98–110)
CK MB CFR SERPL CALC: 4.2 NG/ML — SIGNIFICANT CHANGE UP (ref 0.6–6.3)
CK SERPL-CCNC: 506 U/L — HIGH (ref 0–225)
CO2 SERPL-SCNC: 22 MMOL/L — SIGNIFICANT CHANGE UP (ref 17–32)
CREAT SERPL-MCNC: 1 MG/DL — SIGNIFICANT CHANGE UP (ref 0.7–1.5)
EOSINOPHIL # BLD AUTO: 0 K/UL — SIGNIFICANT CHANGE UP (ref 0–0.7)
EOSINOPHIL NFR BLD AUTO: 0 % — SIGNIFICANT CHANGE UP (ref 0–8)
GIANT PLATELETS BLD QL SMEAR: PRESENT — SIGNIFICANT CHANGE UP
GLUCOSE BLDC GLUCOMTR-MCNC: 107 MG/DL — HIGH (ref 70–99)
GLUCOSE BLDC GLUCOMTR-MCNC: 120 MG/DL — HIGH (ref 70–99)
GLUCOSE BLDC GLUCOMTR-MCNC: 120 MG/DL — HIGH (ref 70–99)
GLUCOSE BLDC GLUCOMTR-MCNC: 126 MG/DL — HIGH (ref 70–99)
GLUCOSE BLDC GLUCOMTR-MCNC: 157 MG/DL — HIGH (ref 70–99)
GLUCOSE SERPL-MCNC: 152 MG/DL — HIGH (ref 70–99)
HCO3 BLDA-SCNC: 25 MMOL/L — SIGNIFICANT CHANGE UP (ref 21–29)
HCO3 BLDA-SCNC: 25 MMOL/L — SIGNIFICANT CHANGE UP (ref 21–29)
HCO3 BLDA-SCNC: 26 MMOL/L — SIGNIFICANT CHANGE UP (ref 21–29)
HCT VFR BLD CALC: 40.2 % — LOW (ref 42–52)
HGB BLD-MCNC: 13 G/DL — LOW (ref 14–18)
HOROWITZ INDEX BLDA+IHG-RTO: 50 — SIGNIFICANT CHANGE UP
LACTATE SERPL-SCNC: 1 MMOL/L — SIGNIFICANT CHANGE UP (ref 0.5–2.2)
LYMPHOCYTES # BLD AUTO: 0 % — LOW (ref 20.5–51.1)
LYMPHOCYTES # BLD AUTO: 0 K/UL — LOW (ref 1.2–3.4)
MAGNESIUM SERPL-MCNC: 1.4 MG/DL — LOW (ref 1.8–2.4)
MANUAL SMEAR VERIFICATION: SIGNIFICANT CHANGE UP
MCHC RBC-ENTMCNC: 26.6 PG — LOW (ref 27–31)
MCHC RBC-ENTMCNC: 32.3 G/DL — SIGNIFICANT CHANGE UP (ref 32–37)
MCV RBC AUTO: 82.2 FL — SIGNIFICANT CHANGE UP (ref 80–94)
MICROCYTES BLD QL: SLIGHT — SIGNIFICANT CHANGE UP
MONOCYTES # BLD AUTO: 0.51 K/UL — SIGNIFICANT CHANGE UP (ref 0.1–0.6)
MONOCYTES NFR BLD AUTO: 2.6 % — SIGNIFICANT CHANGE UP (ref 1.7–9.3)
NEUTROPHILS # BLD AUTO: 18.97 K/UL — HIGH (ref 1.4–6.5)
NEUTROPHILS NFR BLD AUTO: 96.5 % — HIGH (ref 42.2–75.2)
OVALOCYTES BLD QL SMEAR: SLIGHT — SIGNIFICANT CHANGE UP
PCO2 BLDA: 41 MMHG — SIGNIFICANT CHANGE UP (ref 38–42)
PCO2 BLDA: 42 MMHG — SIGNIFICANT CHANGE UP (ref 38–42)
PCO2 BLDA: 42 MMHG — SIGNIFICANT CHANGE UP (ref 38–42)
PH BLDA: 7.38 — SIGNIFICANT CHANGE UP (ref 7.38–7.42)
PH BLDA: 7.38 — SIGNIFICANT CHANGE UP (ref 7.38–7.42)
PH BLDA: 7.41 — SIGNIFICANT CHANGE UP (ref 7.38–7.42)
PHOSPHATE SERPL-MCNC: 3.2 MG/DL — SIGNIFICANT CHANGE UP (ref 2.1–4.9)
PLAT MORPH BLD: NORMAL — SIGNIFICANT CHANGE UP
PLATELET # BLD AUTO: 146 K/UL — SIGNIFICANT CHANGE UP (ref 130–400)
PO2 BLDA: 104 MMHG — HIGH (ref 78–95)
PO2 BLDA: 111 MMHG — HIGH (ref 78–95)
PO2 BLDA: 85 MMHG — SIGNIFICANT CHANGE UP (ref 78–95)
POIKILOCYTOSIS BLD QL AUTO: SIGNIFICANT CHANGE UP
POTASSIUM SERPL-MCNC: 4.9 MMOL/L — SIGNIFICANT CHANGE UP (ref 3.5–5)
POTASSIUM SERPL-SCNC: 4.9 MMOL/L — SIGNIFICANT CHANGE UP (ref 3.5–5)
RBC # BLD: 4.89 M/UL — SIGNIFICANT CHANGE UP (ref 4.7–6.1)
RBC # FLD: 14.5 % — SIGNIFICANT CHANGE UP (ref 11.5–14.5)
RBC BLD AUTO: ABNORMAL
SAO2 % BLDA: 96 % — SIGNIFICANT CHANGE UP (ref 92–96)
SAO2 % BLDA: 98 % — HIGH (ref 92–96)
SAO2 % BLDA: 98 % — HIGH (ref 92–96)
SODIUM SERPL-SCNC: 138 MMOL/L — SIGNIFICANT CHANGE UP (ref 135–146)
TROPONIN T SERPL-MCNC: <0.01 NG/ML — SIGNIFICANT CHANGE UP
VARIANT LYMPHS # BLD: 0.9 % — SIGNIFICANT CHANGE UP (ref 0–5)
WBC # BLD: 19.66 K/UL — HIGH (ref 4.8–10.8)
WBC # FLD AUTO: 19.66 K/UL — HIGH (ref 4.8–10.8)

## 2019-04-20 PROCEDURE — 93010 ELECTROCARDIOGRAM REPORT: CPT

## 2019-04-20 PROCEDURE — 71045 X-RAY EXAM CHEST 1 VIEW: CPT | Mod: 26

## 2019-04-20 RX ORDER — SODIUM CHLORIDE 9 MG/ML
1000 INJECTION, SOLUTION INTRAVENOUS
Qty: 0 | Refills: 0 | Status: DISCONTINUED | OUTPATIENT
Start: 2019-04-20 | End: 2019-04-20

## 2019-04-20 RX ORDER — HYDROCHLOROTHIAZIDE 25 MG
25 TABLET ORAL DAILY
Qty: 0 | Refills: 0 | Status: DISCONTINUED | OUTPATIENT
Start: 2019-04-20 | End: 2019-04-22

## 2019-04-20 RX ORDER — PROPOFOL 10 MG/ML
30.36 INJECTION, EMULSION INTRAVENOUS
Qty: 500 | Refills: 0 | Status: DISCONTINUED | OUTPATIENT
Start: 2019-04-20 | End: 2019-04-21

## 2019-04-20 RX ORDER — SODIUM CHLORIDE 9 MG/ML
1000 INJECTION, SOLUTION INTRAVENOUS
Qty: 0 | Refills: 0 | Status: DISCONTINUED | OUTPATIENT
Start: 2019-04-20 | End: 2019-04-22

## 2019-04-20 RX ORDER — AMLODIPINE BESYLATE 2.5 MG/1
10 TABLET ORAL DAILY
Qty: 0 | Refills: 0 | Status: DISCONTINUED | OUTPATIENT
Start: 2019-04-20 | End: 2019-04-22

## 2019-04-20 RX ORDER — VALSARTAN 80 MG/1
320 TABLET ORAL DAILY
Qty: 0 | Refills: 0 | Status: DISCONTINUED | OUTPATIENT
Start: 2019-04-20 | End: 2019-04-22

## 2019-04-20 RX ORDER — ATORVASTATIN CALCIUM 80 MG/1
10 TABLET, FILM COATED ORAL AT BEDTIME
Qty: 0 | Refills: 0 | Status: DISCONTINUED | OUTPATIENT
Start: 2019-04-20 | End: 2019-04-22

## 2019-04-20 RX ORDER — MAGNESIUM SULFATE 500 MG/ML
2 VIAL (ML) INJECTION ONCE
Qty: 0 | Refills: 0 | Status: COMPLETED | OUTPATIENT
Start: 2019-04-20 | End: 2019-04-20

## 2019-04-20 RX ADMIN — SODIUM CHLORIDE 100 MILLILITER(S): 9 INJECTION, SOLUTION INTRAVENOUS at 00:01

## 2019-04-20 RX ADMIN — Medication 50 GRAM(S): at 02:30

## 2019-04-20 RX ADMIN — SODIUM CHLORIDE 150 MILLILITER(S): 9 INJECTION, SOLUTION INTRAVENOUS at 01:00

## 2019-04-20 RX ADMIN — CHLORHEXIDINE GLUCONATE 1 APPLICATION(S): 213 SOLUTION TOPICAL at 06:45

## 2019-04-20 RX ADMIN — HEPARIN SODIUM 5000 UNIT(S): 5000 INJECTION INTRAVENOUS; SUBCUTANEOUS at 05:50

## 2019-04-20 RX ADMIN — HEPARIN SODIUM 5000 UNIT(S): 5000 INJECTION INTRAVENOUS; SUBCUTANEOUS at 13:11

## 2019-04-20 RX ADMIN — ATORVASTATIN CALCIUM 10 MILLIGRAM(S): 80 TABLET, FILM COATED ORAL at 21:33

## 2019-04-20 RX ADMIN — PANTOPRAZOLE SODIUM 40 MILLIGRAM(S): 20 TABLET, DELAYED RELEASE ORAL at 12:57

## 2019-04-20 RX ADMIN — SODIUM CHLORIDE 75 MILLILITER(S): 9 INJECTION, SOLUTION INTRAVENOUS at 18:05

## 2019-04-20 RX ADMIN — HEPARIN SODIUM 5000 UNIT(S): 5000 INJECTION INTRAVENOUS; SUBCUTANEOUS at 21:33

## 2019-04-20 RX ADMIN — SODIUM CHLORIDE 75 MILLILITER(S): 9 INJECTION, SOLUTION INTRAVENOUS at 12:24

## 2019-04-20 NOTE — PROGRESS NOTE ADULT - ASSESSMENT
68 year old male s/p robotic assisted repair of incisional hernia/lysis of adhesions/repair with mesh/component separation/TAP block  plan:  1.SBT- extubation  2.Hemodynamic monitoring   3.Strict is and os   4.Sicu care   5.Out of bed to chair after extubation

## 2019-04-20 NOTE — ANESTHESIA FOLLOW-UP NOTE - NSEVALATIONFT_GEN_ALL_CORE
Pt still on Vent, Awake and alert. Hr 88, /71, SaO2 100%  Plan; Repeat ABG, and the ABG is satisfactory, trial of CPAP, and if pt tolerates the CPAP trial pt may be extubated.
Pt tolerated the CPAP trial and pt extubated at 5;25am. pt doing well, vital stable. On O2 505 via FM.
Pt is still on Vent, Vt 550, RR 14, FiO2 50,PEEP +5 CM H2O.  Pt awake, alert, head lift > 5 sec. AB.38/42/85/26/96. based on the PaO2, pt can not be extubated at this moment,  Pt sign out to Dr. Alfred, ICU resident to valuate the pt for ICU admission and extubate the pt when criteria met. PACU - RN aware.

## 2019-04-20 NOTE — PROGRESS NOTE ADULT - SUBJECTIVE AND OBJECTIVE BOX
GENERAL SURGERY PROGRESS NOTE     EVELIO LLAMAS  68y  Male  Hospital day :5d  POD:1  Procedure: Ventral herniorrhaphy using component separation technique  Robot-assisted incisional herniorrhaphy    OVERNIGHT EVENTS:Patient was transferred to pacu from the OR at 11:45pm, patient is intubated, vitals stable, awake and following commands, minimal pain.    T(F): 99 (04-20-19 @ 04:00), Max: 99.2 (04-20-19 @ 00:01)  HR: 90 (04-20-19 @ 05:00) (81 - 100)  BP: 163/71 (04-20-19 @ 05:00) (136/70 - 163/71)  RR: 15 (04-20-19 @ 05:00) (14 - 18)  SpO2: 100% (04-20-19 @ 02:00) (94% - 100%)    DIET/FLUIDS: lactated ringers. 1000 milliLiter(s) IV Continuous <Continuous>        URINE:    Indwelling Urethral Catheter:     Connect To:  Straight Drainage/Gravity    Indication:  Urine Output Monitoring in Critically Ill (04-20-19 @ 01:41)    GI proph:  pantoprazole  Injectable 40 milliGRAM(s) IV Push daily    AC/ proph: heparin  Injectable 5000 Unit(s) SubCutaneous every 8 hours    ABx:     PHYSICAL EXAM:  GENERAL: NAD,intubated, on ventilator   CHEST/LUNG: Clear to auscultation bilaterally  HEART: Regular rate and rhythm  ABDOMEN: Soft, Nontender, Nondistended; surgical site clean dry and intact         LABS  Labs:  CAPILLARY BLOOD GLUCOSE      POCT Blood Glucose.: 157 mg/dL (20 Apr 2019 00:34)  POCT Blood Glucose.: 125 mg/dL (19 Apr 2019 11:41)  POCT Blood Glucose.: 110 mg/dL (19 Apr 2019 06:49)                          13.0   19.66 )-----------( 146      ( 20 Apr 2019 00:00 )             40.2       Auto Neutrophil %: 96.5 % (04-20-19 @ 00:00)    04-20    138  |  103  |  10  ----------------------------<  152<H>  4.9   |  22  |  1.0      Calcium, Total Serum: 7.6 mg/dL (04-20-19 @ 00:00)      LFTs:     Blood Gas Arterial, Lactate: 0.7 mmoL/L (04-20-19 @ 02:03)  Blood Gas Arterial, Lactate: 0.8 mmoL/L (04-20-19 @ 00:05)  Lactate, Blood: 1.0 mmol/L (04-20-19 @ 00:00)    ABG - ( 20 Apr 2019 02:03 )  pH: 7.38  /  pCO2: 42    /  pO2: 104   / HCO3: 25    / Base Excess: -0.3  /  SaO2: 98              ABG - ( 20 Apr 2019 00:05 )  pH: 7.38  /  pCO2: 42    /  pO2: 85    / HCO3: 25    / Base Excess: -0.6  /  SaO2: 96              ABG - ( 16 Apr 2019 04:56 )  pH: 7.45  /  pCO2: 48    /  pO2: 66    / HCO3: 33    / Base Excess: 8.1   /  SaO2: 93                Coags:    CARDIAC MARKERS ( 20 Apr 2019 00:00 )  x     / x     / 506 U/L / x     / x      CARDIAC MARKERS ( 20 Apr 2019 00:00 )  x     / <0.01 ng/mL / x     / x     / 4.2 ng/mL

## 2019-04-20 NOTE — CHART NOTE - NSCHARTNOTEFT_GEN_A_CORE
PACU ANESTHESIA ADMISSION NOTE      Procedure: Ventral herniorrhaphy using component separation technique  Robot-assisted incisional herniorrhaphy    Post op diagnosis:  Incarcerated incisional hernia      __x__  Intubated  TV:___600___       Rate: ___14___      FiO2: ___50%___ PEEP +5    ____  Patent Airway    ____  Full return of protective reflexes    ____  Full recovery from anesthesia / back to baseline status    Vitals:  T(F): 99.2  HR: 100  BP: 148/79  RR: 14  SpO2: 95%    Mental Status:  ___x_ Awake   _____ Alert   _____ Drowsy   _____ Sedated    Nausea/Vomiting:  _x___ NO  ______Yes,   See Post - Op Orders          Pain Scale (0-10):  _____    Treatment: ____ None    __x__ See Post - Op/PCA Orders    Post - Operative Fluids:   ____ Oral   __x__ See Post - Op Orders    Plan: Discharge:   ____Home       __x___Floor     _____Critical Care    _____  Other:_________________    Comments: Patient tolerated procedure  Failed extubation criteria   Transfer to PACU for continued extubation trial

## 2019-04-21 LAB
ANION GAP SERPL CALC-SCNC: 9 MMOL/L — SIGNIFICANT CHANGE UP (ref 7–14)
BASOPHILS # BLD AUTO: 0.03 K/UL — SIGNIFICANT CHANGE UP (ref 0–0.2)
BASOPHILS NFR BLD AUTO: 0.2 % — SIGNIFICANT CHANGE UP (ref 0–1)
BUN SERPL-MCNC: 6 MG/DL — LOW (ref 10–20)
CALCIUM SERPL-MCNC: 7.5 MG/DL — LOW (ref 8.5–10.1)
CHLORIDE SERPL-SCNC: 107 MMOL/L — SIGNIFICANT CHANGE UP (ref 98–110)
CO2 SERPL-SCNC: 24 MMOL/L — SIGNIFICANT CHANGE UP (ref 17–32)
CREAT SERPL-MCNC: 0.9 MG/DL — SIGNIFICANT CHANGE UP (ref 0.7–1.5)
EOSINOPHIL # BLD AUTO: 0.14 K/UL — SIGNIFICANT CHANGE UP (ref 0–0.7)
EOSINOPHIL NFR BLD AUTO: 1.1 % — SIGNIFICANT CHANGE UP (ref 0–8)
GLUCOSE SERPL-MCNC: 138 MG/DL — HIGH (ref 70–99)
HCT VFR BLD CALC: 35.7 % — LOW (ref 42–52)
HGB BLD-MCNC: 11.6 G/DL — LOW (ref 14–18)
IMM GRANULOCYTES NFR BLD AUTO: 1 % — HIGH (ref 0.1–0.3)
LYMPHOCYTES # BLD AUTO: 1.02 K/UL — LOW (ref 1.2–3.4)
LYMPHOCYTES # BLD AUTO: 8 % — LOW (ref 20.5–51.1)
MAGNESIUM SERPL-MCNC: 1.6 MG/DL — LOW (ref 1.8–2.4)
MCHC RBC-ENTMCNC: 26.5 PG — LOW (ref 27–31)
MCHC RBC-ENTMCNC: 32.5 G/DL — SIGNIFICANT CHANGE UP (ref 32–37)
MCV RBC AUTO: 81.7 FL — SIGNIFICANT CHANGE UP (ref 80–94)
MONOCYTES # BLD AUTO: 1.14 K/UL — HIGH (ref 0.1–0.6)
MONOCYTES NFR BLD AUTO: 9 % — SIGNIFICANT CHANGE UP (ref 1.7–9.3)
NEUTROPHILS # BLD AUTO: 10.25 K/UL — HIGH (ref 1.4–6.5)
NEUTROPHILS NFR BLD AUTO: 80.7 % — HIGH (ref 42.2–75.2)
NRBC # BLD: 0 /100 WBCS — SIGNIFICANT CHANGE UP (ref 0–0)
PHOSPHATE SERPL-MCNC: 1.6 MG/DL — LOW (ref 2.1–4.9)
PLATELET # BLD AUTO: 140 K/UL — SIGNIFICANT CHANGE UP (ref 130–400)
POTASSIUM SERPL-MCNC: 3.9 MMOL/L — SIGNIFICANT CHANGE UP (ref 3.5–5)
POTASSIUM SERPL-SCNC: 3.9 MMOL/L — SIGNIFICANT CHANGE UP (ref 3.5–5)
RBC # BLD: 4.37 M/UL — LOW (ref 4.7–6.1)
RBC # FLD: 14.6 % — HIGH (ref 11.5–14.5)
SODIUM SERPL-SCNC: 140 MMOL/L — SIGNIFICANT CHANGE UP (ref 135–146)
WBC # BLD: 12.71 K/UL — HIGH (ref 4.8–10.8)
WBC # FLD AUTO: 12.71 K/UL — HIGH (ref 4.8–10.8)

## 2019-04-21 PROCEDURE — 99232 SBSQ HOSP IP/OBS MODERATE 35: CPT

## 2019-04-21 RX ORDER — MAGNESIUM SULFATE 500 MG/ML
2 VIAL (ML) INJECTION ONCE
Qty: 0 | Refills: 0 | Status: COMPLETED | OUTPATIENT
Start: 2019-04-21 | End: 2019-04-21

## 2019-04-21 RX ORDER — POTASSIUM PHOSPHATE, MONOBASIC POTASSIUM PHOSPHATE, DIBASIC 236; 224 MG/ML; MG/ML
15 INJECTION, SOLUTION INTRAVENOUS ONCE
Qty: 0 | Refills: 0 | Status: COMPLETED | OUTPATIENT
Start: 2019-04-21 | End: 2019-04-21

## 2019-04-21 RX ADMIN — SODIUM CHLORIDE 75 MILLILITER(S): 9 INJECTION, SOLUTION INTRAVENOUS at 05:08

## 2019-04-21 RX ADMIN — VALSARTAN 320 MILLIGRAM(S): 80 TABLET ORAL at 05:07

## 2019-04-21 RX ADMIN — HEPARIN SODIUM 5000 UNIT(S): 5000 INJECTION INTRAVENOUS; SUBCUTANEOUS at 21:05

## 2019-04-21 RX ADMIN — AMLODIPINE BESYLATE 10 MILLIGRAM(S): 2.5 TABLET ORAL at 05:08

## 2019-04-21 RX ADMIN — POTASSIUM PHOSPHATE, MONOBASIC POTASSIUM PHOSPHATE, DIBASIC 62.5 MILLIMOLE(S): 236; 224 INJECTION, SOLUTION INTRAVENOUS at 17:37

## 2019-04-21 RX ADMIN — PANTOPRAZOLE SODIUM 40 MILLIGRAM(S): 20 TABLET, DELAYED RELEASE ORAL at 12:14

## 2019-04-21 RX ADMIN — SODIUM CHLORIDE 75 MILLILITER(S): 9 INJECTION, SOLUTION INTRAVENOUS at 12:11

## 2019-04-21 RX ADMIN — Medication 25 MILLIGRAM(S): at 05:07

## 2019-04-21 RX ADMIN — HEPARIN SODIUM 5000 UNIT(S): 5000 INJECTION INTRAVENOUS; SUBCUTANEOUS at 05:07

## 2019-04-21 RX ADMIN — SODIUM CHLORIDE 75 MILLILITER(S): 9 INJECTION, SOLUTION INTRAVENOUS at 17:37

## 2019-04-21 RX ADMIN — Medication 50 GRAM(S): at 12:13

## 2019-04-21 RX ADMIN — ATORVASTATIN CALCIUM 10 MILLIGRAM(S): 80 TABLET, FILM COATED ORAL at 21:06

## 2019-04-21 RX ADMIN — HEPARIN SODIUM 5000 UNIT(S): 5000 INJECTION INTRAVENOUS; SUBCUTANEOUS at 14:45

## 2019-04-21 NOTE — PROGRESS NOTE ADULT - ASSESSMENT
A/P:  EVELIO LLAMAS is a 68yMale HD7/POD1 from Ventral herniorrhaphy using component separation technique  Robot-assisted incisional herniorrhaphy      Plan:   -on fulls, advance to RD t/d  -awaiting return of bowel function  -pain control  -dispo planning

## 2019-04-22 ENCOUNTER — TRANSCRIPTION ENCOUNTER (OUTPATIENT)
Age: 69
End: 2019-04-22

## 2019-04-22 VITALS
DIASTOLIC BLOOD PRESSURE: 66 MMHG | HEART RATE: 87 BPM | RESPIRATION RATE: 18 BRPM | TEMPERATURE: 98 F | SYSTOLIC BLOOD PRESSURE: 128 MMHG

## 2019-04-22 LAB
ANION GAP SERPL CALC-SCNC: 10 MMOL/L — SIGNIFICANT CHANGE UP (ref 7–14)
BASOPHILS # BLD AUTO: 0.03 K/UL — SIGNIFICANT CHANGE UP (ref 0–0.2)
BASOPHILS NFR BLD AUTO: 0.2 % — SIGNIFICANT CHANGE UP (ref 0–1)
BUN SERPL-MCNC: 5 MG/DL — LOW (ref 10–20)
CALCIUM SERPL-MCNC: 7.6 MG/DL — LOW (ref 8.5–10.1)
CHLORIDE SERPL-SCNC: 105 MMOL/L — SIGNIFICANT CHANGE UP (ref 98–110)
CO2 SERPL-SCNC: 25 MMOL/L — SIGNIFICANT CHANGE UP (ref 17–32)
CREAT SERPL-MCNC: 1 MG/DL — SIGNIFICANT CHANGE UP (ref 0.7–1.5)
EOSINOPHIL # BLD AUTO: 0.18 K/UL — SIGNIFICANT CHANGE UP (ref 0–0.7)
EOSINOPHIL NFR BLD AUTO: 1.4 % — SIGNIFICANT CHANGE UP (ref 0–8)
GLUCOSE SERPL-MCNC: 126 MG/DL — HIGH (ref 70–99)
HCT VFR BLD CALC: 34.2 % — LOW (ref 42–52)
HGB BLD-MCNC: 10.9 G/DL — LOW (ref 14–18)
IMM GRANULOCYTES NFR BLD AUTO: 1.1 % — HIGH (ref 0.1–0.3)
LYMPHOCYTES # BLD AUTO: 1.11 K/UL — LOW (ref 1.2–3.4)
LYMPHOCYTES # BLD AUTO: 8.8 % — LOW (ref 20.5–51.1)
MAGNESIUM SERPL-MCNC: 1.9 MG/DL — SIGNIFICANT CHANGE UP (ref 1.8–2.4)
MCHC RBC-ENTMCNC: 26.3 PG — LOW (ref 27–31)
MCHC RBC-ENTMCNC: 31.9 G/DL — LOW (ref 32–37)
MCV RBC AUTO: 82.6 FL — SIGNIFICANT CHANGE UP (ref 80–94)
MONOCYTES # BLD AUTO: 1.25 K/UL — HIGH (ref 0.1–0.6)
MONOCYTES NFR BLD AUTO: 9.9 % — HIGH (ref 1.7–9.3)
NEUTROPHILS # BLD AUTO: 9.9 K/UL — HIGH (ref 1.4–6.5)
NEUTROPHILS NFR BLD AUTO: 78.6 % — HIGH (ref 42.2–75.2)
NRBC # BLD: 0 /100 WBCS — SIGNIFICANT CHANGE UP (ref 0–0)
PHOSPHATE SERPL-MCNC: 2 MG/DL — LOW (ref 2.1–4.9)
PLATELET # BLD AUTO: 144 K/UL — SIGNIFICANT CHANGE UP (ref 130–400)
POTASSIUM SERPL-MCNC: 3.6 MMOL/L — SIGNIFICANT CHANGE UP (ref 3.5–5)
POTASSIUM SERPL-SCNC: 3.6 MMOL/L — SIGNIFICANT CHANGE UP (ref 3.5–5)
RBC # BLD: 4.14 M/UL — LOW (ref 4.7–6.1)
RBC # FLD: 14.8 % — HIGH (ref 11.5–14.5)
SODIUM SERPL-SCNC: 140 MMOL/L — SIGNIFICANT CHANGE UP (ref 135–146)
WBC # BLD: 12.61 K/UL — HIGH (ref 4.8–10.8)
WBC # FLD AUTO: 12.61 K/UL — HIGH (ref 4.8–10.8)

## 2019-04-22 RX ORDER — SENNA PLUS 8.6 MG/1
2 TABLET ORAL
Qty: 30 | Refills: 0
Start: 2019-04-22

## 2019-04-22 RX ORDER — POTASSIUM PHOSPHATE, MONOBASIC POTASSIUM PHOSPHATE, DIBASIC 236; 224 MG/ML; MG/ML
30 INJECTION, SOLUTION INTRAVENOUS ONCE
Qty: 0 | Refills: 0 | Status: COMPLETED | OUTPATIENT
Start: 2019-04-22 | End: 2019-04-22

## 2019-04-22 RX ORDER — DOCUSATE SODIUM 100 MG
1 CAPSULE ORAL
Qty: 30 | Refills: 0
Start: 2019-04-22

## 2019-04-22 RX ADMIN — AMLODIPINE BESYLATE 10 MILLIGRAM(S): 2.5 TABLET ORAL at 05:45

## 2019-04-22 RX ADMIN — Medication 25 MILLIGRAM(S): at 05:45

## 2019-04-22 RX ADMIN — CHLORHEXIDINE GLUCONATE 1 APPLICATION(S): 213 SOLUTION TOPICAL at 05:44

## 2019-04-22 RX ADMIN — VALSARTAN 320 MILLIGRAM(S): 80 TABLET ORAL at 05:45

## 2019-04-22 RX ADMIN — HEPARIN SODIUM 5000 UNIT(S): 5000 INJECTION INTRAVENOUS; SUBCUTANEOUS at 05:44

## 2019-04-22 RX ADMIN — POTASSIUM PHOSPHATE, MONOBASIC POTASSIUM PHOSPHATE, DIBASIC 83.33 MILLIMOLE(S): 236; 224 INJECTION, SOLUTION INTRAVENOUS at 05:44

## 2019-04-22 NOTE — PROGRESS NOTE ADULT - ASSESSMENT
Assessment:  68y Male patient admitted s/p Ventral herniorrhaphy using component separation technique, Robot-assisted incisional herniorrhaphy, with the above physical exam, labs, and imaging findings.    Plan:  -Advance to soft diet today  -Pain control as needed  -Hemodynamic monitoring as per routine  -Encourage ambulation and incentive spirometer use (10x/hr when awake)  -GI and DVT prophylaxis  -Check and replete CBC and BMP q daily  -Strict input and output monitoring  -Continue current management  -If tolerates, can be discharged home    Date/Time: 04-22-19 @ 01:59

## 2019-04-22 NOTE — DISCHARGE NOTE PROVIDER - HOSPITAL COURSE
68 male with PMH HTN, DLD, GERD, PSH panc Ca s/p whipple procedure 2010 presented to the ED with 5 DAYS OF nausea and vomiting and abdominal pain. He has had this hernia since his surgery in '10 and he can usually "move it and put it back in" and never reported it being "stuck" until now. He was admitted and underwent a robotic assisted incarcerated hernia repair with bilateral component separation and lysis of adhesions.. He tolerated the procedure well. He was transferred back to the floor following surgery. He is ambulating, voiding without issue, and passing gas. He is tolerating a regular diet and is now medically cleared for discharge. He will follow up with Dr. Gaspar in clinic within the next week.

## 2019-04-22 NOTE — PROGRESS NOTE ADULT - REASON FOR ADMISSION
Incarcerated hernia

## 2019-04-22 NOTE — DISCHARGE NOTE NURSING/CASE MANAGEMENT/SOCIAL WORK - NSDCDPATPORTLINK_GEN_ALL_CORE
You can access the [facility] Patient Portal, offered by [facility], by registering with the following website: [email or web address]

## 2019-04-22 NOTE — PROGRESS NOTE ADULT - ATTENDING COMMENTS
Stable and afebrile.  No n/v.  Tolerates fulls.  Not passing flatus or Bm.  Abdomen mildly tender to pupation, wound intact, mildly distended.    a/p:  progressing well.  Fulls and IVF.  OOB, IS.  May go home later today if comfortable.
examined on 4/20  s/p lap/eli component separation with placement of retrorectus mesh  ambulating     Stable and afebrile.  No n/v.  Not passing flatus or Bm.  Abdomen soft , mildly incisional tenderenss, wound clean clean and dry.    a/p:  doing well post op .  start liquids  and IVF.  OOB, IS.  dc planning
pt examined on 4/17  no abdominal pain or tenderness  reducible incisional hernia.   Hathaway  urine - much lighter    Pt with severe DAISY with likely a combination of dehydration and intrarenal - improving   continue resuscitation.  CT scan shows passage of contrast to colon.  Serial abdominal exam.   will start clears
pt examined on 4/18  no abdominal pain or tenderness  reducible incisional hernia.   Hathaway  urine - much lighter    Pt with severe DAISY with likely a combination of dehydration and intrarenal - improving   continue resuscitation.  CT scan shows passage of contrast to colon.  Serial abdominal exam.   tolerating clears  will continue clears
pt examined on 4/19  no abdominal pain or tenderness  reducible incisional hernia.   Hathaway  urine - much lighter    Pt with severe DAISY with likely a combination of dehydration and intrarenal - improved    CT scan shows passage of contrast to colon. had bowel movement   risk benefit examined.     pt and family wishes to have surgery .     will do lap /eli/open ventral hernia with component separation and mesh
s/p lap/eli component separation with placement of retrorectus mesh  ambulating     Stable and afebrile.  No n/v.  passing flatus  tolerating liquids  Abdomen soft  wound clean and dry.    a/p:  doing well post op .  OOB, IS.  dc planning
awaiting or
I was Physically Present for the key portions of the evaluation   I agree with the above History  , Physical examination Assessment and plan   I have Reviewed , Modified or appended where appropriate.  Please check A and P as above   1- DAISY most likely prerenal due to SBO non oliguric improving with hydration   continue IVF   follow UO and BMP  surgery on case  will follow

## 2019-04-22 NOTE — DISCHARGE NOTE PROVIDER - NSDCCPCAREPLAN_GEN_ALL_CORE_FT
PRINCIPAL DISCHARGE DIAGNOSIS  Diagnosis: SBO (small bowel obstruction)  Assessment and Plan of Treatment:       SECONDARY DISCHARGE DIAGNOSES  Diagnosis: Vomiting  Assessment and Plan of Treatment:     Diagnosis: Dehydration  Assessment and Plan of Treatment:     Diagnosis: DAISY (acute kidney injury)  Assessment and Plan of Treatment:     Diagnosis: Incarcerated hernia  Assessment and Plan of Treatment:

## 2019-04-22 NOTE — PROGRESS NOTE ADULT - SUBJECTIVE AND OBJECTIVE BOX
Progress Note: Trauma Surgery  Patient: EVELIO LLAMAS , 68y (1950)Male   MRN: [id number]  Location: [address]  Visit: 04-15-19 Inpatient  Date: 04-22-19 @ 01:59  Hospital Day: 8  Post-op Day: 3    Procedure/Injury: s/p Ventral herniorrhaphy using component separation technique, Robot-assisted incisional herniorrhaphy  Events over 24h: Pt passed TOV. No acute event overnight. No new complaint. Pt is vitally stable. On full liquid diet, tolerating well. Denies nausea, vomiting, and/or abdominal pain. Ambulating inadequately. Using Incentive spirometer. Voiding adequately. -Flatus, -Bowel movement. Anticipated for today.    Vitals: T(F): 98.8 (04-21-19 @ 23:02), Max: 98.8 (04-21-19 @ 23:02)  HR: 94 (04-21-19 @ 23:02)  BP: 131/63 (04-21-19 @ 23:02) (109/62 - 163/74)  RR: 18 (04-21-19 @ 23:02)    Diet: Diet, Full Liquid:   DASH/TLC Sodium & Cholesterol Restricted (04-20-19 @ 15:48)    IV Fluid: dextrose 5% + sodium chloride 0.45%. 1000 milliLiter(s) (75 mL/Hr) IV Continuous <Continuous>    In:   04-20-19 @ 07:01  -  04-21-19 @ 07:00  --------------------------------------------------------  IN: 1895 mL    04-21-19 @ 07:01  -  04-22-19 @ 01:59  --------------------------------------------------------  IN: 1295 mL      Out:   04-20-19 @ 07:01  -  04-21-19 @ 07:00  --------------------------------------------------------  OUT:    Indwelling Catheter - Urethral: 250 mL    Voided: 550 mL  Total OUT: 800 mL      04-21-19 @ 07:01  -  04-22-19 @ 01:59  --------------------------------------------------------  OUT:    Voided: 1450 mL  Total OUT: 1450 mL        Net:   04-20-19 @ 07:01  -  04-21-19 @ 07:00  --------------------------------------------------------  NET: 1095 mL    04-21-19 @ 07:01  -  04-22-19 @ 01:59  --------------------------------------------------------  NET: -155 mL    Physical Examination:  General Appearance: NAD, alert and cooperative  HEENT: NCAT, WNL  Heart: S1 and S2. No murmurs. Rhythm is regular  Lungs: Clear to auscultation BL  Abdomen: Positive bowel sounds. Soft, nondistended, pranav-incisional tenderness       Medications: [Standing]  amLODIPine   Tablet 10 milliGRAM(s) Oral daily  atorvastatin 10 milliGRAM(s) Oral at bedtime  chlorhexidine 4% Liquid 1 Application(s) Topical <User Schedule>  dextrose 5% + sodium chloride 0.45%. 1000 milliLiter(s) (75 mL/Hr) IV Continuous <Continuous>  heparin  Injectable 5000 Unit(s) SubCutaneous every 8 hours  hydrochlorothiazide 25 milliGRAM(s) Oral daily  pantoprazole  Injectable 40 milliGRAM(s) IV Push daily  valsartan 320 milliGRAM(s) Oral daily    Medications:[PRN]    Labs:                        11.6   12.71 )-----------( 140      ( 21 Apr 2019 06:05 )             35.7   04-21    140  |  107  |  6<L>  ----------------------------<  138<H>  3.9   |  24  |  0.9    Ca    7.5<L>      21 Apr 2019 06:05  Phos  1.6     04-21  Mg     1.6     04-21    ABG - ( 20 Apr 2019 05:05 )  pH: 7.41  /  pCO2: 41    /  pO2: 111   / HCO3: 26    / Base Excess: 1.2   /  SaO2: 98                  Micro/Urine:    Imaging:  None/24h

## 2019-04-22 NOTE — DISCHARGE NOTE PROVIDER - CARE PROVIDER_API CALL
Kevin Gaspar)  Surgery  [address] [place] [address], [address] [place] [address]  Phone: [phone]  Fax: [phone]  Follow Up Time:

## 2019-04-23 PROBLEM — Z00.00 ENCOUNTER FOR PREVENTIVE HEALTH EXAMINATION: Status: ACTIVE | Noted: 2019-04-23

## 2019-04-23 PROBLEM — I10 ESSENTIAL (PRIMARY) HYPERTENSION: Chronic | Status: ACTIVE | Noted: 2019-04-15

## 2019-04-23 PROBLEM — E78.5 HYPERLIPIDEMIA, UNSPECIFIED: Chronic | Status: ACTIVE | Noted: 2019-04-15

## 2019-04-24 LAB — SURGICAL PATHOLOGY STUDY: SIGNIFICANT CHANGE UP

## 2019-04-29 DIAGNOSIS — N18.2 CHRONIC KIDNEY DISEASE, STAGE 2 (MILD): ICD-10-CM

## 2019-04-29 DIAGNOSIS — G47.33 OBSTRUCTIVE SLEEP APNEA (ADULT) (PEDIATRIC): ICD-10-CM

## 2019-04-29 DIAGNOSIS — E86.0 DEHYDRATION: ICD-10-CM

## 2019-04-29 DIAGNOSIS — I12.9 HYPERTENSIVE CHRONIC KIDNEY DISEASE WITH STAGE 1 THROUGH STAGE 4 CHRONIC KIDNEY DISEASE, OR UNSPECIFIED CHRONIC KIDNEY DISEASE: ICD-10-CM

## 2019-04-29 DIAGNOSIS — K21.9 GASTRO-ESOPHAGEAL REFLUX DISEASE WITHOUT ESOPHAGITIS: ICD-10-CM

## 2019-04-29 DIAGNOSIS — K43.0 INCISIONAL HERNIA WITH OBSTRUCTION, WITHOUT GANGRENE: ICD-10-CM

## 2019-04-29 DIAGNOSIS — Z85.07 PERSONAL HISTORY OF MALIGNANT NEOPLASM OF PANCREAS: ICD-10-CM

## 2019-04-29 DIAGNOSIS — R65.11 SYSTEMIC INFLAMMATORY RESPONSE SYNDROME (SIRS) OF NON-INFECTIOUS ORIGIN WITH ACUTE ORGAN DYSFUNCTION: ICD-10-CM

## 2019-04-29 DIAGNOSIS — E78.5 HYPERLIPIDEMIA, UNSPECIFIED: ICD-10-CM

## 2019-04-29 DIAGNOSIS — E87.4 MIXED DISORDER OF ACID-BASE BALANCE: ICD-10-CM

## 2019-04-29 DIAGNOSIS — N17.9 ACUTE KIDNEY FAILURE, UNSPECIFIED: ICD-10-CM

## 2019-04-29 DIAGNOSIS — Z99.89 DEPENDENCE ON OTHER ENABLING MACHINES AND DEVICES: ICD-10-CM

## 2019-05-01 ENCOUNTER — TRANSCRIPTION ENCOUNTER (OUTPATIENT)
Age: 69
End: 2019-05-01

## 2019-05-01 ENCOUNTER — APPOINTMENT (OUTPATIENT)
Dept: SURGERY | Facility: CLINIC | Age: 69
End: 2019-05-01
Payer: MEDICARE

## 2019-05-01 VITALS
WEIGHT: 251 LBS | SYSTOLIC BLOOD PRESSURE: 124 MMHG | BODY MASS INDEX: 37.18 KG/M2 | DIASTOLIC BLOOD PRESSURE: 70 MMHG | HEIGHT: 69 IN

## 2019-05-01 DIAGNOSIS — C25.9 MALIGNANT NEOPLASM OF PANCREAS, UNSPECIFIED: ICD-10-CM

## 2019-05-01 DIAGNOSIS — K43.2 INCISIONAL HERNIA W/OUT OBSTRUCTION OR GANGRENE: ICD-10-CM

## 2019-05-01 DIAGNOSIS — K56.51 INTESTINAL ADHESIONS [BANDS], WITH PARTIAL OBSTRUCTION: ICD-10-CM

## 2019-05-01 DIAGNOSIS — K56.609 UNSPECIFIED INTESTINAL OBSTRUCTION, UNSPECIFIED AS TO PARTIAL VERSUS COMPLETE OBSTRUCTION: ICD-10-CM

## 2019-05-01 PROCEDURE — 99024 POSTOP FOLLOW-UP VISIT: CPT

## 2019-05-01 NOTE — HISTORY OF PRESENT ILLNESS
[de-identified] : John is a 68 year old male who had come to the hospital with nausea and vomiting. He presented with Renal Failure. He was resuscitated and later taken to the OR. \par He underwent an extensive robotic assisted lysis of adhesion, component separation with placement of retrorectus mesh. He was discharged later. \par Since discharge he is doing well. \par He has recovered completely.\par He is tolerating diet and having good bowel movements.\par He has not needed any medications. \par \par

## 2019-05-01 NOTE — ASSESSMENT
[FreeTextEntry1] : John is a 68 year old male who had come to the hospital with nausea and vomiting. He presented with Renal Failure. He was resuscitated and later taken to the OR. \par He underwent an extensive robotic assisted lysis of adhesion, component separation with placement of retrorectus mesh. He was discharged later. \par Since discharge he is doing well. \par He has recovered completely.\par He is tolerating diet and having good bowel movements.\par He has not needed any medications. \par  \par \par We explained in great detail the pathophysiology of the disease process. We jeff diagrams and discussed the workup for diagnosis and management.\par The Post operative care was explained to the patient. He was counselled on diet , exercise and wound care.\par We discussed the pathology and surgery with him.\par \par We discussed the importance of close follow up. \par We informed that he needs to follow up as needed. \par We also informed that he can call us if anything changes or has any questions.\par

## 2019-05-01 NOTE — PHYSICAL EXAM
[JVD] : no jugular venous distention  [Respiratory Effort] : normal respiratory effort [Purpura] : no purpura  [Alert] : alert [Calm] : calm [de-identified] : Normal [de-identified] : Normal [de-identified] : Normal - well healed scars. \par  [de-identified] : Normal

## 2021-03-25 NOTE — ED PROVIDER NOTE - NS ED MD TWO NIGHTS YN
Yes
76 y/o male with hx of  intermittent burning sensation when urinating x  1 years. Pt reports awakening hourly to urinate. Scheduled for Cystoscopy, bladder biopsy, prostate urethral biopsy.

## 2021-06-07 ENCOUNTER — APPOINTMENT (OUTPATIENT)
Dept: PULMONOLOGY | Facility: CLINIC | Age: 71
End: 2021-06-07
Payer: MEDICARE

## 2021-06-07 VITALS
HEART RATE: 80 BPM | BODY MASS INDEX: 32.29 KG/M2 | DIASTOLIC BLOOD PRESSURE: 80 MMHG | SYSTOLIC BLOOD PRESSURE: 120 MMHG | OXYGEN SATURATION: 97 % | RESPIRATION RATE: 12 BRPM | HEIGHT: 69 IN | WEIGHT: 218 LBS

## 2021-06-07 PROCEDURE — 99213 OFFICE O/P EST LOW 20 MIN: CPT

## 2021-12-06 ENCOUNTER — APPOINTMENT (OUTPATIENT)
Age: 71
End: 2021-12-06
Payer: MEDICARE

## 2021-12-06 VITALS
SYSTOLIC BLOOD PRESSURE: 132 MMHG | OXYGEN SATURATION: 97 % | HEART RATE: 73 BPM | WEIGHT: 223 LBS | DIASTOLIC BLOOD PRESSURE: 82 MMHG | RESPIRATION RATE: 14 BRPM | HEIGHT: 69 IN | BODY MASS INDEX: 33.03 KG/M2

## 2021-12-06 PROCEDURE — 99213 OFFICE O/P EST LOW 20 MIN: CPT

## 2022-04-20 NOTE — ED PROVIDER NOTE - CRITICAL CARE INDICATION, MLM
patient was critically ill... Patient was critically ill with a high probability of imminent or life threatening deterioration. Attending Statement (EDUAR Govea MD):    HPI: 72y/o M with h/o HTN, HLD, EFrEF (EF 20%), CAD s/p stents x7, recent AICD placement (4/14/22), presenting to the ED for chest pressure since last night; CP is substernal, occurs at rest, at times severe, pressure like, non radiating. No leg swelling. +SOB. No cough, no fever.    Review of Systems:  -General: no fever or chills  -ENT: no congestion, no difficulty swallowing  -Pulmonary: no cough, +shortness of breath  -Cardiac: +chest pain, no palpitations  -Gastrointestinal: no abdominal pain, no nausea, no vomiting, and no diarrhea.  -Genitourinary: no blood or pain with urination  -Musculoskeletal: no back or neck pain  -Skin: no rashes  -Endocrine: No h/o diabetes   -Neurologic: No new weakness or numbness in extremities    All else negative unless otherwise specified elsewhere in this note.    PSH/PMH as noted above    On Physical Exam:  General: well appearing, in NAD, speaking clearly in full sentences and without difficulty; cooperative with exam  HEENT: PERRL, MMM  Neck: no neck tenderness, no nuchal rigidity  Cardiac: s1s2  Lungs: decreased BS at bases; not tachypneic or hypoxic  Abdomen: soft nontender/nondistended  : no bladder tenderness or distension  Skin: intact, no rash  Extremities: 1+ LE peripheral edema b/l; no gross deformities  Neuro: no gross neurologic deficits    MDM: Concern for ACS vs HF exacerbation; will check screening labs; obtain POCUS, and consider starting lasix. To be admitted for further evaluation/management.

## 2022-06-03 ENCOUNTER — INPATIENT (INPATIENT)
Facility: HOSPITAL | Age: 72
LOS: 3 days | Discharge: HOME | End: 2022-06-07
Attending: INTERNAL MEDICINE | Admitting: INTERNAL MEDICINE
Payer: MEDICARE

## 2022-06-03 VITALS
RESPIRATION RATE: 18 BRPM | HEIGHT: 69 IN | SYSTOLIC BLOOD PRESSURE: 145 MMHG | DIASTOLIC BLOOD PRESSURE: 90 MMHG | TEMPERATURE: 99 F | WEIGHT: 225.09 LBS | HEART RATE: 87 BPM | OXYGEN SATURATION: 98 %

## 2022-06-03 DIAGNOSIS — I25.10 ATHEROSCLEROTIC HEART DISEASE OF NATIVE CORONARY ARTERY WITHOUT ANGINA PECTORIS: ICD-10-CM

## 2022-06-03 DIAGNOSIS — Z98.890 OTHER SPECIFIED POSTPROCEDURAL STATES: Chronic | ICD-10-CM

## 2022-06-03 LAB
ALBUMIN SERPL ELPH-MCNC: 4.4 G/DL — SIGNIFICANT CHANGE UP (ref 3.5–5.2)
ALP SERPL-CCNC: 109 U/L — SIGNIFICANT CHANGE UP (ref 30–115)
ALT FLD-CCNC: 14 U/L — SIGNIFICANT CHANGE UP (ref 0–41)
ANION GAP SERPL CALC-SCNC: 13 MMOL/L — SIGNIFICANT CHANGE UP (ref 7–14)
APTT BLD: 29.4 SEC — SIGNIFICANT CHANGE UP (ref 27–39.2)
AST SERPL-CCNC: 25 U/L — SIGNIFICANT CHANGE UP (ref 0–41)
BASOPHILS # BLD AUTO: 0.08 K/UL — SIGNIFICANT CHANGE UP (ref 0–0.2)
BASOPHILS NFR BLD AUTO: 0.8 % — SIGNIFICANT CHANGE UP (ref 0–1)
BILIRUB SERPL-MCNC: 0.5 MG/DL — SIGNIFICANT CHANGE UP (ref 0.2–1.2)
BUN SERPL-MCNC: 12 MG/DL — SIGNIFICANT CHANGE UP (ref 10–20)
CALCIUM SERPL-MCNC: 9.5 MG/DL — SIGNIFICANT CHANGE UP (ref 8.5–10.1)
CHLORIDE SERPL-SCNC: 98 MMOL/L — SIGNIFICANT CHANGE UP (ref 98–110)
CO2 SERPL-SCNC: 27 MMOL/L — SIGNIFICANT CHANGE UP (ref 17–32)
CREAT SERPL-MCNC: 0.8 MG/DL — SIGNIFICANT CHANGE UP (ref 0.7–1.5)
EGFR: 93 ML/MIN/1.73M2 — SIGNIFICANT CHANGE UP
EOSINOPHIL # BLD AUTO: 0.32 K/UL — SIGNIFICANT CHANGE UP (ref 0–0.7)
EOSINOPHIL NFR BLD AUTO: 3 % — SIGNIFICANT CHANGE UP (ref 0–8)
GLUCOSE SERPL-MCNC: 97 MG/DL — SIGNIFICANT CHANGE UP (ref 70–99)
HCT VFR BLD CALC: 46.5 % — SIGNIFICANT CHANGE UP (ref 42–52)
HGB BLD-MCNC: 16.2 G/DL — SIGNIFICANT CHANGE UP (ref 14–18)
IMM GRANULOCYTES NFR BLD AUTO: 1 % — HIGH (ref 0.1–0.3)
INR BLD: 1.05 RATIO — SIGNIFICANT CHANGE UP (ref 0.65–1.3)
LYMPHOCYTES # BLD AUTO: 1.36 K/UL — SIGNIFICANT CHANGE UP (ref 1.2–3.4)
LYMPHOCYTES # BLD AUTO: 12.9 % — LOW (ref 20.5–51.1)
MCHC RBC-ENTMCNC: 29.6 PG — SIGNIFICANT CHANGE UP (ref 27–31)
MCHC RBC-ENTMCNC: 34.8 G/DL — SIGNIFICANT CHANGE UP (ref 32–37)
MCV RBC AUTO: 85 FL — SIGNIFICANT CHANGE UP (ref 80–94)
MONOCYTES # BLD AUTO: 0.75 K/UL — HIGH (ref 0.1–0.6)
MONOCYTES NFR BLD AUTO: 7.1 % — SIGNIFICANT CHANGE UP (ref 1.7–9.3)
NEUTROPHILS # BLD AUTO: 7.91 K/UL — HIGH (ref 1.4–6.5)
NEUTROPHILS NFR BLD AUTO: 75.2 % — SIGNIFICANT CHANGE UP (ref 42.2–75.2)
NRBC # BLD: 0 /100 WBCS — SIGNIFICANT CHANGE UP (ref 0–0)
NT-PROBNP SERPL-SCNC: 1254 PG/ML — HIGH (ref 0–300)
PLATELET # BLD AUTO: 165 K/UL — SIGNIFICANT CHANGE UP (ref 130–400)
POTASSIUM SERPL-MCNC: 4.2 MMOL/L — SIGNIFICANT CHANGE UP (ref 3.5–5)
POTASSIUM SERPL-SCNC: 4.2 MMOL/L — SIGNIFICANT CHANGE UP (ref 3.5–5)
PROT SERPL-MCNC: 7.3 G/DL — SIGNIFICANT CHANGE UP (ref 6–8)
PROTHROM AB SERPL-ACNC: 12.1 SEC — SIGNIFICANT CHANGE UP (ref 9.95–12.87)
RBC # BLD: 5.47 M/UL — SIGNIFICANT CHANGE UP (ref 4.7–6.1)
RBC # FLD: 13.3 % — SIGNIFICANT CHANGE UP (ref 11.5–14.5)
SARS-COV-2 RNA SPEC QL NAA+PROBE: SIGNIFICANT CHANGE UP
SODIUM SERPL-SCNC: 138 MMOL/L — SIGNIFICANT CHANGE UP (ref 135–146)
TROPONIN T SERPL-MCNC: <0.01 NG/ML — SIGNIFICANT CHANGE UP
WBC # BLD: 10.53 K/UL — SIGNIFICANT CHANGE UP (ref 4.8–10.8)
WBC # FLD AUTO: 10.53 K/UL — SIGNIFICANT CHANGE UP (ref 4.8–10.8)

## 2022-06-03 PROCEDURE — 99223 1ST HOSP IP/OBS HIGH 75: CPT

## 2022-06-03 PROCEDURE — 93010 ELECTROCARDIOGRAM REPORT: CPT

## 2022-06-03 PROCEDURE — 71045 X-RAY EXAM CHEST 1 VIEW: CPT | Mod: 26

## 2022-06-03 PROCEDURE — 99285 EMERGENCY DEPT VISIT HI MDM: CPT | Mod: GC

## 2022-06-03 RX ORDER — AMLODIPINE BESYLATE 2.5 MG/1
10 TABLET ORAL DAILY
Refills: 0 | Status: DISCONTINUED | OUTPATIENT
Start: 2022-06-03 | End: 2022-06-04

## 2022-06-03 RX ORDER — CHLORHEXIDINE GLUCONATE 213 G/1000ML
1 SOLUTION TOPICAL
Refills: 0 | Status: DISCONTINUED | OUTPATIENT
Start: 2022-06-03 | End: 2022-06-07

## 2022-06-03 RX ORDER — FERROUS SULFATE 325(65) MG
1 TABLET ORAL
Qty: 0 | Refills: 0 | DISCHARGE

## 2022-06-03 RX ORDER — APIXABAN 2.5 MG/1
5 TABLET, FILM COATED ORAL EVERY 12 HOURS
Refills: 0 | Status: DISCONTINUED | OUTPATIENT
Start: 2022-06-03 | End: 2022-06-07

## 2022-06-03 RX ORDER — LANSOPRAZOLE 15 MG/1
1 CAPSULE, DELAYED RELEASE ORAL
Qty: 0 | Refills: 0 | DISCHARGE

## 2022-06-03 RX ORDER — HYDROCHLOROTHIAZIDE 25 MG
25 TABLET ORAL DAILY
Refills: 0 | Status: DISCONTINUED | OUTPATIENT
Start: 2022-06-03 | End: 2022-06-04

## 2022-06-03 RX ORDER — METOPROLOL TARTRATE 50 MG
12.5 TABLET ORAL EVERY 12 HOURS
Refills: 0 | Status: DISCONTINUED | OUTPATIENT
Start: 2022-06-03 | End: 2022-06-04

## 2022-06-03 RX ORDER — VALSARTAN 80 MG/1
320 TABLET ORAL DAILY
Refills: 0 | Status: DISCONTINUED | OUTPATIENT
Start: 2022-06-03 | End: 2022-06-04

## 2022-06-03 RX ORDER — ATORVASTATIN CALCIUM 80 MG/1
10 TABLET, FILM COATED ORAL DAILY
Refills: 0 | Status: DISCONTINUED | OUTPATIENT
Start: 2022-06-03 | End: 2022-06-07

## 2022-06-03 RX ORDER — LIPASE/PROTEASE/AMYLASE 16-48-48K
1 CAPSULE,DELAYED RELEASE (ENTERIC COATED) ORAL
Qty: 0 | Refills: 0 | DISCHARGE

## 2022-06-03 RX ORDER — PANTOPRAZOLE SODIUM 20 MG/1
40 TABLET, DELAYED RELEASE ORAL
Refills: 0 | Status: DISCONTINUED | OUTPATIENT
Start: 2022-06-03 | End: 2022-06-07

## 2022-06-03 RX ADMIN — Medication 25 MILLIGRAM(S): at 23:51

## 2022-06-03 RX ADMIN — Medication 12.5 MILLIGRAM(S): at 19:09

## 2022-06-03 RX ADMIN — APIXABAN 5 MILLIGRAM(S): 2.5 TABLET, FILM COATED ORAL at 19:09

## 2022-06-03 RX ADMIN — ATORVASTATIN CALCIUM 10 MILLIGRAM(S): 80 TABLET, FILM COATED ORAL at 23:51

## 2022-06-03 RX ADMIN — AMLODIPINE BESYLATE 10 MILLIGRAM(S): 2.5 TABLET ORAL at 23:51

## 2022-06-03 RX ADMIN — VALSARTAN 320 MILLIGRAM(S): 80 TABLET ORAL at 23:52

## 2022-06-03 NOTE — ED PROVIDER NOTE - PHYSICAL EXAMINATION
Vital Signs: I have reviewed the initial vital signs.  Constitutional: well-nourished, appears stated age, no acute distress  Cardiovascular: regular rate, (+)irregular rhythm, well-perfused extremities  Respiratory: unlabored respiratory effort, clear to auscultation bilaterally  Gastrointestinal: soft, non-tender abdomen  Musculoskeletal: supple neck, no lower extremity edema  Integumentary: warm, dry, no rash  Neurologic: awake, alert, extremities’ motor and sensory functions grossly intact  Psychiatric: appropriate mood, appropriate affect

## 2022-06-03 NOTE — CONSULT NOTE ADULT - NS ATTEND AMEND GEN_ALL_CORE FT
Cardiologist: Dr. Karin Velasco: Dr. Brown    70 yo M w/ h/o AMY c/w CPAP, HTN, HL, non-obstructive CAD who presented to the ER for new onset AF detected on his apple watch. He denies any cardiac symptoms.    Currently in AF, rate controlled.    Rec  - Eliquis 5mg PO BID for CHADS VASc at least 3 (HTN, Age > 65, CAD)  - Low dose BB  - Check 2D echo  - Check TFTs  - MARI/CV possibly on Mon with cardiology   - CPAP HS  - Follow up as outpatient in 2-4 weeks with me

## 2022-06-03 NOTE — H&P ADULT - ATTENDING COMMENTS
GENERAL: NAD, well-developed, Non-toxic, stated age   HEAD:  Atraumatic, Normocephalic  EYES: EOMI, Sclera White   NECK: Supple, No JVD  CHEST/LUNG: clear b/l breath sounds; No wheezing, rhonchi, or crackles  HEART: irregular rate and rhythm; s1, s2, No murmurs, rubs, or gallops  ABDOMEN: Soft, Nontender, Nondistended; Bowel sounds present, No rebound or guarding noted   EXTREMITIES:  No lower extremity edema or calf tenderness to palpation.  No clubbing or cyanosis  PSYCH: AAOx3, pleasant, cooperative, not anxious  NEUROLOGY: CN intact, 5/5 strength in all extremities, Sensation grossly intact.   SKIN: No rashes or lesions    71 yr old male with hx of HTN, HLD, Pancreatic CA s/p Whipple procedure (2010), SBO, GERD, and AMY on CPAP presents after apple watch notification of Afib x2. Patient reports no symptoms on both notifications.     # Paroxsymal Afib w/o RVR  - rate controlled at [facility]  - [facility]: 2  - start metoprolol 25mg daily   - start Eliquis   - check echo   - check TSH   - outpt sleep study   - seen by EP: MARI/DCCV, tentatively Monday if pt has not yet converted by then  - telemetry    # DVT ppx  - start Eliquis    **Pt seen on 06/03/22

## 2022-06-03 NOTE — H&P ADULT - HISTORY OF PRESENT ILLNESS
71M w/ PMHx of HTN, HLD, Pancreatic CA s/p Whipple procedure (2010), SBO, and AMY on CPAP presents after apple watch notification of Afib x2.  71M w/ PMHx of HTN, HLD, Pancreatic CA s/p Whipple procedure (2010), SBO, GERD, and AMY on CPAP presents after apple watch notification of Afib x2.  71M w/ PMHx of HTN, HLD, Pancreatic CA s/p Whipple procedure (2010), SBO, GERD, and AMY on CPAP presents after apple watch notification of Afib. Pt noted 2 notifications last night while he was working out at home and 3 notifications today while at rest. Pt was asymptomatic during these episodes. Denies fatigue, fever, chills, CP, SOB, cough, wheezing, dizziness, diaphoresis, abd pain, nausea, vomiting, diarrhea, constipation, dysuria and blood in stool or urine.

## 2022-06-03 NOTE — H&P ADULT - NSHPSOCIALHISTORY_GEN_ALL_CORE
Former smoker quit 30y/a: about 15-20 pack year hx  drinks alcohol socially  denies illicit drug use

## 2022-06-03 NOTE — H&P ADULT - NSICDXFAMILYHX_GEN_ALL_CORE_FT
FAMILY HISTORY:  No pertinent family history in first degree relatives     FAMILY HISTORY:  FHx: cancer, father: Liver ca , Mother: lung ca, Brother: stomach+esophageal ca

## 2022-06-03 NOTE — CONSULT NOTE ADULT - SUBJECTIVE AND OBJECTIVE BOX
Patient is a 71y old  Male who presents with a chief complaint of rapid and irregular heart beat.       HPI:  The pt is a 71y M w/ hx of HTN, HLD, AMY (CPAP, compliant), non-obstructive CAD, GERD, GIB (2013, 2017 - no source identified) presenting with new onset a-fib. Pt wears an apple watch, states that yesterday his watch notified him about a-fib. He was working out at the time so didn't make anything of it. Then this morning he got another notification. Denies palpitations, SOB, lightheadedness. Afebrile. No headache, chest pain, N/V, abdominal pain, diarrhea, dysuria. Sees Cardiology Dr. Frazier.    EP consulted for new onset AF.     REVIEW OF SYSTEMS    [x] A ten-point review of systems was otherwise negative except as noted.  [ ] Due to altered mental status/intubation, subjective information were not able to be obtained from the patient. History was obtained, to the extent possible, from review of the chart and collateral sources of information.      PAST MEDICAL & SURGICAL HISTORY:  HTN (hypertension)      HLD (hyperlipidemia)      History of pancreatic surgery          Home Medications:  amLODIPine 10 mg oral tablet: 1 tab(s) orally once a day (16 Apr 2019 02:15)  atorvastatin 10 mg oral tablet: 1 tab(s) orally once a day (16 Apr 2019 02:16)  ferrous sulfate 325 mg (65 mg elemental iron) oral delayed release tablet: 1 tab(s) orally every other day (16 Apr 2019 02:18)  lansoprazole 15 mg oral delayed release capsule: 1 cap(s) orally once a day (16 Apr 2019 02:16)  valsartan-hydrochlorothiazide 320mg-25mg oral tablet: 1 tab(s) orally once a day (16 Apr 2019 02:15)  Zenpep 15,000 units-47,000 units-63,000 units oral delayed release capsule: 1 tab(s) orally once a day, As Needed (16 Apr 2019 02:17)      Allergies:  No Known Allergies      FAMILY HISTORY:  No pertinent family history in first degree relatives      MEDICATIONS  (STANDING):    MEDICATIONS  (PRN):      Vital Signs Last 24 Hrs  T(C): 37.3 (03 Jun 2022 12:02), Max: 37.3 (03 Jun 2022 12:02)  T(F): 99.1 (03 Jun 2022 12:02), Max: 99.1 (03 Jun 2022 12:02)  HR: 87 (03 Jun 2022 12:02) (87 - 87)  BP: 145/90 (03 Jun 2022 12:02) (145/90 - 145/90)  BP(mean): --  RR: 18 (03 Jun 2022 12:02) (18 - 18)  SpO2: 98% (03 Jun 2022 12:02) (98% - 98%)    PHYSICAL EXAM:    GENERAL: Elderly male, over weight.  In no apparent distress, well nourished, and hydrated.  HEART: Irregular rate and rhythm; No murmurs, rubs, or gallops.  PULMONARY: Clear to auscultation and perfusion.  No rales, wheezing, or rhonchi bilaterally.  ABDOMEN: Soft, Nontender, Nondistended; Bowel sounds present  EXTREMITIES:  2+ Peripheral Pulses, No clubbing, cyanosis, or edema  NEUROLOGICAL: AO x4, FITZGERALD, speech clear    INTERPRETATION OF TELEMETRY: n/a    ECG:  n/a    LABS:                        16.2   10.53 )-----------( 165      ( 03 Jun 2022 14:25 )             46.5     06-03    138  |  98  |  12  ----------------------------<  97  4.2   |  27  |  0.8    Ca    9.5      03 Jun 2022 14:25    TPro  7.3  /  Alb  4.4  /  TBili  0.5  /  DBili  x   /  AST  25  /  ALT  14  /  AlkPhos  109  06-03        PT/INR - ( 03 Jun 2022 14:25 )   PT: 12.10 sec;   INR: 1.05 ratio         PTT - ( 03 Jun 2022 14:25 )  PTT:29.4 sec  BNP      I&O's Detail      RADIOLOGY:   Patient is a 71y old  Male who presents with a chief complaint of rapid and irregular heart beat.     HPI:  The pt is a 71y M w/ hx of HTN, HLD, AMY (CPAP, compliant), non-obstructive CAD, GERD, GIB (2013, 2017 - no source identified) presenting with new onset a-fib. Pt wears an apple watch, states that yesterday his watch notified him about a-fib. He was working out at the time so didn't make anything of it. Then this morning he got another notification. Denies palpitations, SOB, lightheadedness. Afebrile. No headache, chest pain, N/V, abdominal pain, diarrhea, dysuria. Sees Cardiology Dr. Frazier.    EP consulted for new onset AF.     REVIEW OF SYSTEMS    [x] A ten-point review of systems was otherwise negative except as noted.  [ ] Due to altered mental status/intubation, subjective information were not able to be obtained from the patient. History was obtained, to the extent possible, from review of the chart and collateral sources of information.      PAST MEDICAL & SURGICAL HISTORY:  HTN (hypertension)      HLD (hyperlipidemia)      History of pancreatic surgery          Home Medications:  amLODIPine 10 mg oral tablet: 1 tab(s) orally once a day (16 Apr 2019 02:15)  atorvastatin 10 mg oral tablet: 1 tab(s) orally once a day (16 Apr 2019 02:16)  ferrous sulfate 325 mg (65 mg elemental iron) oral delayed release tablet: 1 tab(s) orally every other day (16 Apr 2019 02:18)  lansoprazole 15 mg oral delayed release capsule: 1 cap(s) orally once a day (16 Apr 2019 02:16)  valsartan-hydrochlorothiazide 320mg-25mg oral tablet: 1 tab(s) orally once a day (16 Apr 2019 02:15)  Zenpep 15,000 units-47,000 units-63,000 units oral delayed release capsule: 1 tab(s) orally once a day, As Needed (16 Apr 2019 02:17)      Allergies:  No Known Allergies      FAMILY HISTORY:  No pertinent family history in first degree relatives      MEDICATIONS  (STANDING):    MEDICATIONS  (PRN):      Vital Signs Last 24 Hrs  T(C): 37.3 (03 Jun 2022 12:02), Max: 37.3 (03 Jun 2022 12:02)  T(F): 99.1 (03 Jun 2022 12:02), Max: 99.1 (03 Jun 2022 12:02)  HR: 87 (03 Jun 2022 12:02) (87 - 87)  BP: 145/90 (03 Jun 2022 12:02) (145/90 - 145/90)  BP(mean): --  RR: 18 (03 Jun 2022 12:02) (18 - 18)  SpO2: 98% (03 Jun 2022 12:02) (98% - 98%)    PHYSICAL EXAM:    GENERAL: Elderly male, over weight.  In no apparent distress, well nourished, and hydrated.  HEART: Irregular rate and rhythm; No murmurs, rubs, or gallops.  PULMONARY: Clear to auscultation and perfusion.  No rales, wheezing, or rhonchi bilaterally.  ABDOMEN: Soft   EXTREMITIES:  2+ Peripheral Pulses, No clubbing, cyanosis, or edema  NEUROLOGICAL: AO x4, FITZGERALD, speech clear    INTERPRETATION OF TELEMETRY: n/a    ECG:  n/a    LABS:                        16.2   10.53 )-----------( 165      ( 03 Jun 2022 14:25 )             46.5     06-03    138  |  98  |  12  ----------------------------<  97  4.2   |  27  |  0.8    Ca    9.5      03 Jun 2022 14:25    TPro  7.3  /  Alb  4.4  /  TBili  0.5  /  DBili  x   /  AST  25  /  ALT  14  /  AlkPhos  109  06-03        PT/INR - ( 03 Jun 2022 14:25 )   PT: 12.10 sec;   INR: 1.05 ratio         PTT - ( 03 Jun 2022 14:25 )  PTT:29.4 sec  BNP      I&O's Detail      RADIOLOGY:

## 2022-06-03 NOTE — H&P ADULT - NSHPPHYSICALEXAM_GEN_ALL_CORE
GENERAL: NAD, Resting in bed  HEENT: NCAT  CHEST/LUNG: Clear to auscultation bilaterally; No wheezing or rubs.   HEART: Regular rate and rhythm; No murmurs, rubs, or gallops  ABDOMEN: Bowel sounds present; Soft, Nontender, Nondistended.   EXTREMITIES:  No clubbing, cyanosis, or edema  NERVOUS SYSTEM:  Alert & Oriented X3 GENERAL: NAD, Resting in bed  HEENT: NCAT  CHEST/LUNG: Clear to auscultation bilaterally; No wheezing or rubs.   HEART: IRRegular rate and rhythm; No murmurs, rubs, or gallops  ABDOMEN: Bowel sounds present; Soft, Nontender, Nondistended.   EXTREMITIES:  mild pitting edema bilaterally  NERVOUS SYSTEM:  Alert & Oriented X3

## 2022-06-03 NOTE — ED PROVIDER NOTE - ATTENDING CONTRIBUTION TO CARE
71-year-old man, history of hypertension, hyperlipidemia, presents with new onset A. fib.  He is asymptomatic; yesterday while he was working out, his apple watch notified him that he was in A. fib.  He assumed it was 2/2 exercising heart rate.  This morning, the watch again alarmed A. fib; he changed watches, and the second watch still reported A. fib so he came to the ED.  On exam, he is very well-appearing, lungs clear, CV S1-S2, irregularly irregular, abdomen soft, nontender, no peripheral edema.  EKG is rate controlled A. fib with no ST changes.  Labs, chest x-ray, likely admit for cardiac eval.

## 2022-06-03 NOTE — H&P ADULT - NSHPLABSRESULTS_GEN_ALL_CORE
16.2   10.53 )-----------( 165      ( 03 Jun 2022 14:25 )             46.5       06-03    138  |  98  |  12  ----------------------------<  97  4.2   |  27  |  0.8    Ca    9.5      03 Jun 2022 14:25    TPro  7.3  /  Alb  4.4  /  TBili  0.5  /  DBili  x   /  AST  25  /  ALT  14  /  AlkPhos  109  06-03        PT/INR - ( 03 Jun 2022 14:25 )   PT: 12.10 sec;   INR: 1.05 ratio         PTT - ( 03 Jun 2022 14:25 )  PTT:29.4 sec

## 2022-06-03 NOTE — H&P ADULT - ASSESSMENT
71M w/ PMHx of HTN, HLD, Pancreatic CA s/p Whipple procedure (2010), SBO, and AMY on CPAP presents after apple watch notification of Afib x2.     # New onset Afib w/ RVR  # HTN  # HLD  # AMY on CPAP  # Hx of pancreatic CA s/p whipple  # Hx of Ventral hernia repair  # Hx of SBO    # DVT ppx - Lovenox / Heparin SQ / SCDs  # GI ppx - PPI / Non indicated  # Diet -  Full code.  71M w/ PMHx of HTN, HLD, Pancreatic CA s/p Whipple procedure (2010), SBO, GERD, and AMY on CPAP presents after apple watch notification of Afib x2.     # New onset Afib w/ RVR  # HTN  # HLD  # AMY on CPAP  # Hx of pancreatic CA s/p whipple  # Hx of Ventral hernia repair  # Hx of SBO    # DVT ppx - Lovenox / Heparin SQ / SCDs  # GI ppx - PPI / Non indicated  # Diet -  Full code.  71M w/ PMHx of HTN, HLD, Pancreatic CA s/p Whipple procedure (2010), SBO, GERD, and AMY on CPAP presents after apple watch notification of Afib x2.     # New onset Afib w/o RVR  - CHADSVASC: 2 (Age, HTN)  - seen by EP: MARI/DCCV, tentatively Monday if pt has not yet converted by then  - AC: Eliquis  - RC: low dose bb  - f/u echo and TSH  - telemetry    # HTN - c/w ccb, arb, and diuretic  # HLD - c/w statin  # AMY on CPAP - cpap over night    # Hx of pancreatic CA s/p whipple  # Hx of Ventral hernia repair  # Hx of SBO      # DVT ppx - Eliquis  # GI ppx - PPI   # Diet - DASH  Full code.

## 2022-06-03 NOTE — H&P ADULT - NSICDXPASTMEDICALHX_GEN_ALL_CORE_FT
PAST MEDICAL HISTORY:  HLD (hyperlipidemia)     HTN (hypertension)     AMY on CPAP     Pancreatic cancer s/p whipple

## 2022-06-03 NOTE — ED ADULT NURSE NOTE - OBJECTIVE STATEMENT
71 year old male alert and oriented c/o afib per apple watch. Patient stated his watch has been telling him he is in afib since last night. Denies chest pain, SOB. No s.s of distress noted.

## 2022-06-03 NOTE — ED PROVIDER NOTE - OBJECTIVE STATEMENT
The pt is a 71y M w/ hx of HTN, HLD presenting with new onset a-fib. Pt wears an apple watch, states that yesterday his watch notified him about a-fib. He was working out at the time so didn't make anything of it. Then this morning he got another notification. Denies palpitations, SOB, lightheadedness. Afebrile. No headache, chest pain, N/V, abdominal pain, diarrhea, dysuria. Sees Cardiology Dr. Frazier.

## 2022-06-04 LAB
ALBUMIN SERPL ELPH-MCNC: 3.7 G/DL — SIGNIFICANT CHANGE UP (ref 3.5–5.2)
ALP SERPL-CCNC: 97 U/L — SIGNIFICANT CHANGE UP (ref 30–115)
ALT FLD-CCNC: 13 U/L — SIGNIFICANT CHANGE UP (ref 0–41)
ANION GAP SERPL CALC-SCNC: 10 MMOL/L — SIGNIFICANT CHANGE UP (ref 7–14)
AST SERPL-CCNC: 20 U/L — SIGNIFICANT CHANGE UP (ref 0–41)
BASOPHILS # BLD AUTO: 0.07 K/UL — SIGNIFICANT CHANGE UP (ref 0–0.2)
BASOPHILS NFR BLD AUTO: 0.7 % — SIGNIFICANT CHANGE UP (ref 0–1)
BILIRUB SERPL-MCNC: 0.8 MG/DL — SIGNIFICANT CHANGE UP (ref 0.2–1.2)
BUN SERPL-MCNC: 13 MG/DL — SIGNIFICANT CHANGE UP (ref 10–20)
CALCIUM SERPL-MCNC: 9.2 MG/DL — SIGNIFICANT CHANGE UP (ref 8.5–10.1)
CHLORIDE SERPL-SCNC: 102 MMOL/L — SIGNIFICANT CHANGE UP (ref 98–110)
CO2 SERPL-SCNC: 29 MMOL/L — SIGNIFICANT CHANGE UP (ref 17–32)
CREAT SERPL-MCNC: 0.9 MG/DL — SIGNIFICANT CHANGE UP (ref 0.7–1.5)
EGFR: 90 ML/MIN/1.73M2 — SIGNIFICANT CHANGE UP
EOSINOPHIL # BLD AUTO: 0.3 K/UL — SIGNIFICANT CHANGE UP (ref 0–0.7)
EOSINOPHIL NFR BLD AUTO: 2.8 % — SIGNIFICANT CHANGE UP (ref 0–8)
GLUCOSE SERPL-MCNC: 126 MG/DL — HIGH (ref 70–99)
HCT VFR BLD CALC: 45.1 % — SIGNIFICANT CHANGE UP (ref 42–52)
HGB BLD-MCNC: 15.5 G/DL — SIGNIFICANT CHANGE UP (ref 14–18)
IMM GRANULOCYTES NFR BLD AUTO: 0.7 % — HIGH (ref 0.1–0.3)
LYMPHOCYTES # BLD AUTO: 1.38 K/UL — SIGNIFICANT CHANGE UP (ref 1.2–3.4)
LYMPHOCYTES # BLD AUTO: 12.9 % — LOW (ref 20.5–51.1)
MAGNESIUM SERPL-MCNC: 2.1 MG/DL — SIGNIFICANT CHANGE UP (ref 1.8–2.4)
MCHC RBC-ENTMCNC: 29.3 PG — SIGNIFICANT CHANGE UP (ref 27–31)
MCHC RBC-ENTMCNC: 34.4 G/DL — SIGNIFICANT CHANGE UP (ref 32–37)
MCV RBC AUTO: 85.3 FL — SIGNIFICANT CHANGE UP (ref 80–94)
MONOCYTES # BLD AUTO: 0.72 K/UL — HIGH (ref 0.1–0.6)
MONOCYTES NFR BLD AUTO: 6.8 % — SIGNIFICANT CHANGE UP (ref 1.7–9.3)
NEUTROPHILS # BLD AUTO: 8.12 K/UL — HIGH (ref 1.4–6.5)
NEUTROPHILS NFR BLD AUTO: 76.1 % — HIGH (ref 42.2–75.2)
NRBC # BLD: 0 /100 WBCS — SIGNIFICANT CHANGE UP (ref 0–0)
PLATELET # BLD AUTO: 149 K/UL — SIGNIFICANT CHANGE UP (ref 130–400)
POTASSIUM SERPL-MCNC: 4.2 MMOL/L — SIGNIFICANT CHANGE UP (ref 3.5–5)
POTASSIUM SERPL-SCNC: 4.2 MMOL/L — SIGNIFICANT CHANGE UP (ref 3.5–5)
PROT SERPL-MCNC: 6.2 G/DL — SIGNIFICANT CHANGE UP (ref 6–8)
RBC # BLD: 5.29 M/UL — SIGNIFICANT CHANGE UP (ref 4.7–6.1)
RBC # FLD: 13.6 % — SIGNIFICANT CHANGE UP (ref 11.5–14.5)
SODIUM SERPL-SCNC: 141 MMOL/L — SIGNIFICANT CHANGE UP (ref 135–146)
WBC # BLD: 10.66 K/UL — SIGNIFICANT CHANGE UP (ref 4.8–10.8)
WBC # FLD AUTO: 10.66 K/UL — SIGNIFICANT CHANGE UP (ref 4.8–10.8)

## 2022-06-04 PROCEDURE — 93306 TTE W/DOPPLER COMPLETE: CPT | Mod: 26

## 2022-06-04 PROCEDURE — 99233 SBSQ HOSP IP/OBS HIGH 50: CPT

## 2022-06-04 RX ORDER — METOPROLOL TARTRATE 50 MG
25 TABLET ORAL
Refills: 0 | Status: DISCONTINUED | OUTPATIENT
Start: 2022-06-04 | End: 2022-06-07

## 2022-06-04 RX ADMIN — ATORVASTATIN CALCIUM 10 MILLIGRAM(S): 80 TABLET, FILM COATED ORAL at 22:57

## 2022-06-04 RX ADMIN — VALSARTAN 320 MILLIGRAM(S): 80 TABLET ORAL at 06:17

## 2022-06-04 RX ADMIN — Medication 25 MILLIGRAM(S): at 06:17

## 2022-06-04 RX ADMIN — APIXABAN 5 MILLIGRAM(S): 2.5 TABLET, FILM COATED ORAL at 06:17

## 2022-06-04 RX ADMIN — AMLODIPINE BESYLATE 10 MILLIGRAM(S): 2.5 TABLET ORAL at 06:16

## 2022-06-04 RX ADMIN — PANTOPRAZOLE SODIUM 40 MILLIGRAM(S): 20 TABLET, DELAYED RELEASE ORAL at 06:16

## 2022-06-04 RX ADMIN — CHLORHEXIDINE GLUCONATE 1 APPLICATION(S): 213 SOLUTION TOPICAL at 06:21

## 2022-06-04 RX ADMIN — APIXABAN 5 MILLIGRAM(S): 2.5 TABLET, FILM COATED ORAL at 17:54

## 2022-06-04 RX ADMIN — Medication 25 MILLIGRAM(S): at 17:55

## 2022-06-04 RX ADMIN — Medication 12.5 MILLIGRAM(S): at 06:17

## 2022-06-04 NOTE — PROGRESS NOTE ADULT - SUBJECTIVE AND OBJECTIVE BOX
pt seen and examined.   pt is feeling well, has no symptoms, no cp, no sob, no dizziness, no palpitation,         ROS: no cp, no sob, no n/v, no fever    PAST MEDICAL & SURGICAL HISTORY:  HTN (hypertension)      HLD (hyperlipidemia)      Pancreatic cancer  s/p whipple      AMY on CPAP      S/P hernia repair          Vital Signs Last 24 Hrs  T(C): 36.1 (04 Jun 2022 08:01), Max: 36.1 (03 Jun 2022 17:35)  T(F): 97 (04 Jun 2022 08:01), Max: 97 (03 Jun 2022 17:35)  HR: 67 (04 Jun 2022 08:01) (67 - 91)  BP: 103/68 (04 Jun 2022 08:01) (103/68 - 135/79)  BP(mean): --  RR: 18 (04 Jun 2022 06:21) (18 - 18)  SpO2: 95% (04 Jun 2022 06:21) (95% - 98%)    physical exam  constitutional NAD, AAOX3, Respiratory  lungs CTA, CVS heart RRR, GI: abdomen Soft NT, ND, BS+, skin: intact  neuro exam Motor, sensory and CN normal, no deficit     MEDICATIONS  (STANDING):  amLODIPine   Tablet 10 milliGRAM(s) Oral daily  apixaban 5 milliGRAM(s) Oral every 12 hours  atorvastatin Oral Tab/Cap - Peds 10 milliGRAM(s) Oral daily  chlorhexidine 4% Liquid 1 Application(s) Topical <User Schedule>  hydrochlorothiazide 25 milliGRAM(s) Oral daily  metoprolol tartrate 12.5 milliGRAM(s) Oral every 12 hours  pantoprazole    Tablet 40 milliGRAM(s) Oral before breakfast  valsartan 320 milliGRAM(s) Oral daily                        15.5   10.66 )-----------( 149      ( 04 Jun 2022 07:05 )             45.1     06-04    141  |  102  |  13  ----------------------------<  126<H>  4.2   |  29  |  0.9    Ca    9.2      04 Jun 2022 07:05  Mg     2.1     06-04    TPro  6.2  /  Alb  3.7  /  TBili  0.8  /  DBili  x   /  AST  20  /  ALT  13  /  AlkPhos  97  06-04      COVID-19 PCR: NotDetec (06-03-22 @ 13:58)    CARDIAC MARKERS ( 03 Jun 2022 21:00 )  x     / <0.01 ng/mL / x     / x     / x        PT/INR - ( 03 Jun 2022 14:25 )   PT: 12.10 sec;   INR: 1.05 ratio    PTT - ( 03 Jun 2022 14:25 )  PTT:29.4 sec    a/p  # afib new onset, asymptomatic , pt found out from his apple watch alert   rate controlled,   dc amlodipine, dc valsartan   increase metoprolol   cont apixaban  fu echo,   cont tele, if not back to NSR, EP is considering cardioversion on monday     PAST MEDICAL & SURGICAL HISTORY:  HTN (hypertension)  HLD (hyperlipidemia)  Pancreatic cancer s/p whipple  AMY on CPAP  S/P hernia repair    #Progress Note Handoff  Pending (specify):  tsh, echo   Family discussion: dw pt   Disposition: acute

## 2022-06-05 LAB
ALBUMIN SERPL ELPH-MCNC: 3.7 G/DL — SIGNIFICANT CHANGE UP (ref 3.5–5.2)
ALP SERPL-CCNC: 91 U/L — SIGNIFICANT CHANGE UP (ref 30–115)
ALT FLD-CCNC: 13 U/L — SIGNIFICANT CHANGE UP (ref 0–41)
ANION GAP SERPL CALC-SCNC: 11 MMOL/L — SIGNIFICANT CHANGE UP (ref 7–14)
AST SERPL-CCNC: 20 U/L — SIGNIFICANT CHANGE UP (ref 0–41)
BILIRUB SERPL-MCNC: 0.6 MG/DL — SIGNIFICANT CHANGE UP (ref 0.2–1.2)
BUN SERPL-MCNC: 17 MG/DL — SIGNIFICANT CHANGE UP (ref 10–20)
CALCIUM SERPL-MCNC: 9.1 MG/DL — SIGNIFICANT CHANGE UP (ref 8.5–10.1)
CHLORIDE SERPL-SCNC: 101 MMOL/L — SIGNIFICANT CHANGE UP (ref 98–110)
CO2 SERPL-SCNC: 28 MMOL/L — SIGNIFICANT CHANGE UP (ref 17–32)
CREAT SERPL-MCNC: 1 MG/DL — SIGNIFICANT CHANGE UP (ref 0.7–1.5)
EGFR: 79 ML/MIN/1.73M2 — SIGNIFICANT CHANGE UP
GLUCOSE SERPL-MCNC: 124 MG/DL — HIGH (ref 70–99)
HCT VFR BLD CALC: 44.4 % — SIGNIFICANT CHANGE UP (ref 42–52)
HGB BLD-MCNC: 15.4 G/DL — SIGNIFICANT CHANGE UP (ref 14–18)
MAGNESIUM SERPL-MCNC: 2 MG/DL — SIGNIFICANT CHANGE UP (ref 1.8–2.4)
MCHC RBC-ENTMCNC: 29.6 PG — SIGNIFICANT CHANGE UP (ref 27–31)
MCHC RBC-ENTMCNC: 34.7 G/DL — SIGNIFICANT CHANGE UP (ref 32–37)
MCV RBC AUTO: 85.2 FL — SIGNIFICANT CHANGE UP (ref 80–94)
NRBC # BLD: 0 /100 WBCS — SIGNIFICANT CHANGE UP (ref 0–0)
PLATELET # BLD AUTO: 158 K/UL — SIGNIFICANT CHANGE UP (ref 130–400)
POTASSIUM SERPL-MCNC: 4.3 MMOL/L — SIGNIFICANT CHANGE UP (ref 3.5–5)
POTASSIUM SERPL-SCNC: 4.3 MMOL/L — SIGNIFICANT CHANGE UP (ref 3.5–5)
PROT SERPL-MCNC: 6.4 G/DL — SIGNIFICANT CHANGE UP (ref 6–8)
RBC # BLD: 5.21 M/UL — SIGNIFICANT CHANGE UP (ref 4.7–6.1)
RBC # FLD: 13.6 % — SIGNIFICANT CHANGE UP (ref 11.5–14.5)
SODIUM SERPL-SCNC: 140 MMOL/L — SIGNIFICANT CHANGE UP (ref 135–146)
WBC # BLD: 12.13 K/UL — HIGH (ref 4.8–10.8)
WBC # FLD AUTO: 12.13 K/UL — HIGH (ref 4.8–10.8)

## 2022-06-05 PROCEDURE — 99233 SBSQ HOSP IP/OBS HIGH 50: CPT

## 2022-06-05 RX ADMIN — ATORVASTATIN CALCIUM 10 MILLIGRAM(S): 80 TABLET, FILM COATED ORAL at 12:11

## 2022-06-05 RX ADMIN — Medication 25 MILLIGRAM(S): at 06:13

## 2022-06-05 RX ADMIN — Medication 25 MILLIGRAM(S): at 17:53

## 2022-06-05 RX ADMIN — PANTOPRAZOLE SODIUM 40 MILLIGRAM(S): 20 TABLET, DELAYED RELEASE ORAL at 06:13

## 2022-06-05 RX ADMIN — APIXABAN 5 MILLIGRAM(S): 2.5 TABLET, FILM COATED ORAL at 06:13

## 2022-06-05 RX ADMIN — APIXABAN 5 MILLIGRAM(S): 2.5 TABLET, FILM COATED ORAL at 17:53

## 2022-06-05 NOTE — PROGRESS NOTE ADULT - SUBJECTIVE AND OBJECTIVE BOX
pt seen and examined.         ROS: no cp, no sob, no n/v, no fever    PAST MEDICAL & SURGICAL HISTORY:  HTN (hypertension)      HLD (hyperlipidemia)      Pancreatic cancer  s/p whipple      AMY on CPAP      S/P hernia repair          Vital Signs Last 24 Hrs  T(C): 36.2 (05 Jun 2022 14:13), Max: 36.6 (05 Jun 2022 06:10)  T(F): 97.2 (05 Jun 2022 14:13), Max: 97.8 (05 Jun 2022 06:10)  HR: 66 (05 Jun 2022 14:13) (66 - 80)  BP: 120/60 (05 Jun 2022 14:13) (115/63 - 131/76)  BP(mean): --  RR: 18 (05 Jun 2022 14:13) (18 - 18)  SpO2: 99% (05 Jun 2022 01:34) (98% - 99%)    physical exam  constitutional NAD, AAOX3, Respiratory  lungs CTA, CVS heart RRR, GI: abdomen Soft NT, ND, BS+, skin: intact  neuro exam Motor, sensory and CN normal, no deficit     MEDICATIONS  (STANDING):  apixaban 5 milliGRAM(s) Oral every 12 hours  atorvastatin Oral Tab/Cap - Peds 10 milliGRAM(s) Oral daily  chlorhexidine 4% Liquid 1 Application(s) Topical <User Schedule>  metoprolol tartrate 25 milliGRAM(s) Oral two times a day  pantoprazole    Tablet 40 milliGRAM(s) Oral before breakfast                        15.4   12.13 )-----------( 158      ( 05 Jun 2022 07:12 )             44.4     06-05    140  |  101  |  17  ----------------------------<  124<H>  4.3   |  28  |  1.0    Ca    9.1      05 Jun 2022 07:12  Mg     2.0     06-05    TPro  6.4  /  Alb  3.7  /  TBili  0.6  /  DBili  x   /  AST  20  /  ALT  13  /  AlkPhos  91  06-05      COVID-19 PCR: NotDetec (06-03-22 @ 13:58)      CARDIAC MARKERS ( 03 Jun 2022 21:00 )  x     / <0.01 ng/mL / x     / x     / x        < from: 12 Lead ECG (06.03.22 @ 11:59) >  Atrial fibrillation  Abnormal ECG    < end of copied text >    a/p  # new onset afib. cont ac,   < from: TTE Echo Complete w/o Contrast w/ Doppler (06.04.22 @ 10:23) >   1. Normal global left ventricular systolic function.   2. Normal left atrial size.   3. Normal right atrial size.   4. No evidence of mitral valve regurgitation.   5. Thickening of the posterior mitral valve leaflet.    < end of copied text >    check tsh   plan is bb , cont tele, if does not stay in sinus , we may consider cardioversion   will call cardio , for poss cardioversion     PAST MEDICAL & SURGICAL HISTORY: cont meds   HTN (hypertension)  HLD (hyperlipidemia)  Pancreatic cancer s/p whipple  AMY on CPAP  S/P hernia repair    #Progress Note Handoff  Pending (specify):  Consults_cardio, tests: tsh   Family discussion: ernesto pt   Disposition: acute

## 2022-06-05 NOTE — PATIENT PROFILE ADULT - FALL HARM RISK - HARM RISK INTERVENTIONS

## 2022-06-06 ENCOUNTER — APPOINTMENT (OUTPATIENT)
Age: 72
End: 2022-06-06

## 2022-06-06 PROBLEM — C25.9 MALIGNANT NEOPLASM OF PANCREAS, UNSPECIFIED: Chronic | Status: ACTIVE | Noted: 2022-06-03

## 2022-06-06 PROBLEM — G47.33 OBSTRUCTIVE SLEEP APNEA (ADULT) (PEDIATRIC): Chronic | Status: ACTIVE | Noted: 2022-06-03

## 2022-06-06 LAB
ALBUMIN SERPL ELPH-MCNC: 3.8 G/DL — SIGNIFICANT CHANGE UP (ref 3.5–5.2)
ALP SERPL-CCNC: 89 U/L — SIGNIFICANT CHANGE UP (ref 30–115)
ALT FLD-CCNC: 12 U/L — SIGNIFICANT CHANGE UP (ref 0–41)
ANION GAP SERPL CALC-SCNC: 10 MMOL/L — SIGNIFICANT CHANGE UP (ref 7–14)
AST SERPL-CCNC: 17 U/L — SIGNIFICANT CHANGE UP (ref 0–41)
BILIRUB SERPL-MCNC: 0.7 MG/DL — SIGNIFICANT CHANGE UP (ref 0.2–1.2)
BUN SERPL-MCNC: 21 MG/DL — HIGH (ref 10–20)
CALCIUM SERPL-MCNC: 9.2 MG/DL — SIGNIFICANT CHANGE UP (ref 8.5–10.1)
CHLORIDE SERPL-SCNC: 104 MMOL/L — SIGNIFICANT CHANGE UP (ref 98–110)
CO2 SERPL-SCNC: 26 MMOL/L — SIGNIFICANT CHANGE UP (ref 17–32)
CREAT SERPL-MCNC: 1 MG/DL — SIGNIFICANT CHANGE UP (ref 0.7–1.5)
EGFR: 79 ML/MIN/1.73M2 — SIGNIFICANT CHANGE UP
GLUCOSE SERPL-MCNC: 110 MG/DL — HIGH (ref 70–99)
HCT VFR BLD CALC: 44.6 % — SIGNIFICANT CHANGE UP (ref 42–52)
HGB BLD-MCNC: 15.2 G/DL — SIGNIFICANT CHANGE UP (ref 14–18)
MAGNESIUM SERPL-MCNC: 1.9 MG/DL — SIGNIFICANT CHANGE UP (ref 1.8–2.4)
MCHC RBC-ENTMCNC: 29.4 PG — SIGNIFICANT CHANGE UP (ref 27–31)
MCHC RBC-ENTMCNC: 34.1 G/DL — SIGNIFICANT CHANGE UP (ref 32–37)
MCV RBC AUTO: 86.3 FL — SIGNIFICANT CHANGE UP (ref 80–94)
NRBC # BLD: 0 /100 WBCS — SIGNIFICANT CHANGE UP (ref 0–0)
PLATELET # BLD AUTO: 144 K/UL — SIGNIFICANT CHANGE UP (ref 130–400)
POTASSIUM SERPL-MCNC: 4.8 MMOL/L — SIGNIFICANT CHANGE UP (ref 3.5–5)
POTASSIUM SERPL-SCNC: 4.8 MMOL/L — SIGNIFICANT CHANGE UP (ref 3.5–5)
PROT SERPL-MCNC: 6.2 G/DL — SIGNIFICANT CHANGE UP (ref 6–8)
RBC # BLD: 5.17 M/UL — SIGNIFICANT CHANGE UP (ref 4.7–6.1)
RBC # FLD: 13.6 % — SIGNIFICANT CHANGE UP (ref 11.5–14.5)
SODIUM SERPL-SCNC: 140 MMOL/L — SIGNIFICANT CHANGE UP (ref 135–146)
WBC # BLD: 12.07 K/UL — HIGH (ref 4.8–10.8)
WBC # FLD AUTO: 12.07 K/UL — HIGH (ref 4.8–10.8)

## 2022-06-06 PROCEDURE — 99233 SBSQ HOSP IP/OBS HIGH 50: CPT

## 2022-06-06 PROCEDURE — 93320 DOPPLER ECHO COMPLETE: CPT | Mod: 26

## 2022-06-06 PROCEDURE — 93312 ECHO TRANSESOPHAGEAL: CPT | Mod: 26

## 2022-06-06 PROCEDURE — 93325 DOPPLER ECHO COLOR FLOW MAPG: CPT | Mod: 26

## 2022-06-06 PROCEDURE — 92960 CARDIOVERSION ELECTRIC EXT: CPT | Mod: XU

## 2022-06-06 RX ADMIN — Medication 25 MILLIGRAM(S): at 17:46

## 2022-06-06 RX ADMIN — PANTOPRAZOLE SODIUM 40 MILLIGRAM(S): 20 TABLET, DELAYED RELEASE ORAL at 05:41

## 2022-06-06 RX ADMIN — ATORVASTATIN CALCIUM 10 MILLIGRAM(S): 80 TABLET, FILM COATED ORAL at 11:29

## 2022-06-06 RX ADMIN — Medication 25 MILLIGRAM(S): at 05:41

## 2022-06-06 RX ADMIN — APIXABAN 5 MILLIGRAM(S): 2.5 TABLET, FILM COATED ORAL at 05:41

## 2022-06-06 RX ADMIN — APIXABAN 5 MILLIGRAM(S): 2.5 TABLET, FILM COATED ORAL at 17:46

## 2022-06-06 NOTE — PROGRESS NOTE ADULT - ASSESSMENT
71M w/ PMHx of HTN, HLD, Pancreatic CA s/p Whipple procedure (2010), SBO, GERD, and AMY on CPAP presents after apple watch notification of Afib.    PAF  HTN / DL  H/O Pancreatic Ca s/p Whipple procedure (2010)  AMY on CPAP            PLAN:    ·	 71M w/ PMHx of HTN, HLD, Pancreatic CA s/p Whipple procedure (2010), SBO, GERD, and AMY on CPAP presents after apple watch notification of Afib.    PAF  HTN / DL  H/O Pancreatic Ca s/p Whipple procedure (2010)  AMY on CPAP            PLAN:    ·	Scheduled for CV today 71M w/ PMHx of HTN, HLD, Pancreatic CA s/p Whipple procedure (2010), SBO, GERD, and AMY on CPAP presents after apple watch notification of Afib.    PAF  HTN / DL  H/O Pancreatic Ca s/p Whipple procedure (2010)  AMY on CPAP            PLAN:    ·	Scheduled for CV today  ·	ECHO showed NLVF  ·	Cont Eliquis 5 mg po q 12h  ·	Cont Metoprolol 25 mg po q 12h  ·	Cont his other meds.     Progress Note Handoff    Pending (specify):  Consults_________, Tests________, Test Results_______, Other___CV today______  Family discussion:  Disposition: Home___/SNF___/Other________/Unknown at this time________    Javi Yeager MD  Spectra: [id number]

## 2022-06-06 NOTE — PROGRESS NOTE ADULT - ASSESSMENT
71M w/ PMHx of HTN, HLD, Pancreatic CA s/p Whipple procedure (2010), SBO, GERD, and AMY on CPAP presents after apple watch notification of Afib x2.     # New onset Afib w/o RVR  - CHADSVASC: 2 (Age, HTN)  - plan for possible MARI/DCCV with EP tday  - continue eliquis 5 BID  - continue lopressor 25 BID  - echo showing normal left ventricular systolic fcn, no comment on EF  -BNP 1254, no signs overload  - f/u TSH  - telemetry    # HTN   - on amlodipine, valsartan-HCTZ at home  -can resume     # HLD   - continue lipitor    # AMY on CPAP   - continue CPAP at night    # Hx of pancreatic CA s/p whipple (201)  # Hx of Ventral hernia repair  # Hx of SBO      # DVT ppx - Eliquis  # GI ppx - PPI   # Diet - DASH  Full code.

## 2022-06-06 NOTE — PROGRESS NOTE ADULT - SUBJECTIVE AND OBJECTIVE BOX
----------Daily Progress Note----------    HISTORY OF PRESENT ILLNESS:  Patient is a 71y old Male who presents with a chief complaint of New onset Afib (06 Jun 2022 07:12)    Currently admitted to medicine with the primary diagnosis of New onset atrial fibrillation       Today is hospital day 3d.     INTERVAL HOSPITAL COURSE / OVERNIGHT EVENTS:    No overnight events    Review of Systems: Otherwise unremarkable     <<<<<PAST MEDICAL & SURGICAL HISTORY>>>>>  HTN (hypertension)    HLD (hyperlipidemia)    Pancreatic cancer  s/p whipple    AMY on CPAP    S/P hernia repair      ALLERGIES  No Known Allergies      Home Medications:  amLODIPine 10 mg oral tablet: 1 tab(s) orally once a day (03 Jun 2022 16:20)  atorvastatin 10 mg oral tablet: 1 tab(s) orally once a day (03 Jun 2022 16:20)  valsartan-hydrochlorothiazide 320mg-25mg oral tablet: 1 tab(s) orally once a day (03 Jun 2022 16:20)        MEDICATIONS  STANDING MEDICATIONS  apixaban 5 milliGRAM(s) Oral every 12 hours  atorvastatin Oral Tab/Cap - Peds 10 milliGRAM(s) Oral daily  chlorhexidine 4% Liquid 1 Application(s) Topical <User Schedule>  metoprolol tartrate 25 milliGRAM(s) Oral two times a day  pantoprazole    Tablet 40 milliGRAM(s) Oral before breakfast    PRN MEDICATIONS    VITALS:  T(F): 96.9  HR: 72  BP: 109/64  RR: 18  SpO2: --    <<<<<LABS>>>>>                        15.2   12.07 )-----------( 144      ( 06 Jun 2022 07:50 )             44.6     06-06    140  |  104  |  21<H>  ----------------------------<  110<H>  4.8   |  26  |  1.0    Ca    9.2      06 Jun 2022 07:50  Mg     1.9     06-06    TPro  6.2  /  Alb  3.8  /  TBili  0.7  /  DBili  x   /  AST  17  /  ALT  12  /  AlkPhos  89  06-06            [id number]        <<<<<RADIOLOGY>>>>>    < from: Xray Chest 1 View- PORTABLE-Routine (Xray Chest 1 View- PORTABLE-Routine .) (06.03.22 @ 18:58) >    PROCEDURE DATE:  06/03/2022          INTERPRETATION:  CLINICAL HISTORY / REASON FOR EXAM: Shortness of breath.    COMPARISON: Chest radiograph from April 20, 2019.    TECHNIQUE/POSITIONING: Satisfactory. Single image, AP portable chest   radiograph.    FINDINGS:    SUPPORT DEVICES: None.    CARDIAC/MEDIASTINUM/HILUM: Unremarkable cardiac silhouette.    LUNG PARENCHYMA/PLEURA: No focal consolidation or pleural effusion. No   pneumothorax.    SKELETON/SOFT TISSUES: Unchanged.      IMPRESSION:    No radiographic evidence of acute cardiopulmonary disease.    Unremarkable cardiac silhouette.    < end of copied text >      <<<<<PHYSICAL EXAM>>>>>  GENERAL: NAD, resting comfortably in bed  PULMONARY: Clear to auscultation bilaterally. No rales, rhonchi, or wheezing.  CARDIOVASCULAR: Regular rate and rhythm, S1-S2, no murmurs  GASTROINTESTINAL: Soft, non-tender, non-distended, no guarding.  SKIN/EXTREMITIES: No LE edema b/l  NEUROLOGIC: AAOX3      -----------------------------------------------------------------------------------------------------------------------------------------------------------------------------------------------

## 2022-06-06 NOTE — CONSULT NOTE ADULT - ASSESSMENT
Stable underlying medical condition: HTN, AMY, GERD,   Afib is a new detection, already started Eliquis and BB    Current therapy  MARI, CV today  anticipate D/C home, after cardioversion and if it is successful, continue with beta blocker, statins and DOAC  f/u with Dr Frazier in 1-2 weeks 
Cardiology: Dr. Frazier    The pt is a 71y M w/ hx of HTN, HLD, AMY (CPAP, compliant), non-obstructive CAD, GERD, GIB (2013, 2017 - no source identified) presenting with new onset a-fib, rate controlled.     Impression:  New onset AF, rate controlled 90s ; CHADSVASC 3 (age, CAD, HTN)  Hx HTN, HLD  Hx AMY , compliant with CPAP  Hx GERd, GIB (unknown source)    Plan:  - Recommend Eliquis 5 mg Po Q 12 for stroke prevention  - Recommend lopressor for heart rate control, low dose  - Echo  - TSH  - Maintain k>4 and mag>2  - CPAP HS  - MARI/DCCV, tentatively Monday if pt has not yet converted by then  - Outpatient fu in 1 month with Dr. Holguin  - Lifestyle modification education - weight loss, diet, importance of CPAP compliance

## 2022-06-06 NOTE — CHART NOTE - NSCHARTNOTEFT_GEN_A_CORE
PACU ANESTHESIA ADMISSION NOTE      Procedure:   Post op diagnosis:      ____  Intubated  TV:______       Rate: ______      FiO2: ______    _x___  Patent Airway    _x___  Full return of protective reflexes    ____  Full recovery from anesthesia / back to baseline status    Vitals:P 67 SR R 14 /70 SpO2 99%  T(C): 36.1 (06-06-22 @ 05:02), Max: 36.3 (06-05-22 @ 21:26)  HR: 72 (06-06-22 @ 05:02) (63 - 80)  BP: 109/64 (06-06-22 @ 05:02) (109/64 - 132/79)  RR: 18 (06-06-22 @ 05:02) (18 - 18)  SpO2: --    Mental Status:  _x___ Awake   _____ Alert   _____ Drowsy   _____ Sedated    Nausea/Vomiting:  _x___  NO       ______Yes,   See Post - Op Orders         Pain Scale (0-10):  __0___    Treatment: _x___ None    ____ See Post - Op/PCA Orders    Post - Operative Fluids:   __x__ Oral   ____ See Post - Op Orders    Plan: Discharge:   ____Home       _X____Floor     _____Critical Care    _____  Other:_________________    Comments:  No anesthesia issues or complications noted.  Discharge when criteria met.

## 2022-06-06 NOTE — PROGRESS NOTE ADULT - SUBJECTIVE AND OBJECTIVE BOX
EVELIO LLAMAS  71y Male    CHIEF COMPLAINT:    Patient is a 71y old  Male who presents with a chief complaint of New onset Afib (2022 14:49)      INTERVAL HPI/OVERNIGHT EVENTS:    Patient seen and examined.    ROS: All other systems are negative.    Vital Signs:    T(F): 96.9 (22 @ 05:02), Max: 97.3 (22 @ 21:26)  HR: 72 (22 @ 05:02) (63 - 80)  BP: 109/64 (22 @ 05:02) (109/64 - 132/79)  RR: 18 (22 @ 05:02) (18 - 18)  SpO2: --  I&O's Summary    2022 07:01  -  2022 07:00  --------------------------------------------------------  IN: 480 mL / OUT: 0 mL / NET: 480 mL      Daily     Daily Weight in k.7 (2022 05:02)  CAPILLARY BLOOD GLUCOSE          PHYSICAL EXAM:    GENERAL:  NAD  SKIN: No rashes or lesions  HENT: Atraumatic. Normocephalic. PERRL. Moist membranes.  NECK: Supple, No JVD. No lymphadenopathy.  PULMONARY: CTA B/L. No wheezing. No rales  CVS: Normal S1, S2. Rate and Rhythm are regular. No murmurs.  ABDOMEN/GI: Soft, Nontender, Nondistended; BS present  EXTREMITIES: Peripheral pulses intact. No edema B/L LE.  NEUROLOGIC:  No motor or sensory deficit.  PSYCH: Alert & oriented x 3    Consultant(s) Notes Reviewed:  [x ] YES  [ ] NO  Care Discussed with Consultants/Other Providers [ x] YES  [ ] NO    EKG reviewed  Telemetry reviewed    LABS:                        15.4   12.13 )-----------( 158      ( 2022 07:12 )             44.4         140  |  101  |  17  ----------------------------<  124<H>  4.3   |  28  |  1.0    Ca    9.1      2022 07:12  Mg     2.0     -    TPro  6.4  /  Alb  3.7  /  TBili  0.6  /  DBili  x   /  AST  20  /  ALT  13  /  AlkPhos  91        Serum Pro-Brain Natriuretic Peptide: 1254 pg/mL (22 @ 21:00)    Trop <0.01, CKMB --, CK --, 22 @ 21:00        RADIOLOGY & ADDITIONAL TESTS:      Imaging or report Personally Reviewed:  [ ] YES  [ ] NO    Medications:  Standing  apixaban 5 milliGRAM(s) Oral every 12 hours  atorvastatin Oral Tab/Cap - Peds 10 milliGRAM(s) Oral daily  chlorhexidine 4% Liquid 1 Application(s) Topical <User Schedule>  metoprolol tartrate 25 milliGRAM(s) Oral two times a day  pantoprazole    Tablet 40 milliGRAM(s) Oral before breakfast    PRN Meds      Case discussed with resident    Care discussed with pt/family           EVELIO LLAMAS  71y Male    CHIEF COMPLAINT:    Patient is a 71y old  Male who presents with a chief complaint of New onset Afib (2022 14:49)      INTERVAL HPI/OVERNIGHT EVENTS:    Patient seen and examined. Feels good. No palpitations. No dizziness. No cp. Scheduled for CV today    ROS: All other systems are negative.    Vital Signs:    T(F): 96.9 (22 @ 05:02), Max: 97.3 (22 @ 21:26)  HR: 72 (22 @ 05:02) (63 - 80)  BP: 109/64 (22 @ 05:02) (109/64 - 132/79)  RR: 18 (22 @ 05:02) (18 - 18)  SpO2: --  I&O's Summary    2022 07:01  -  2022 07:00  --------------------------------------------------------  IN: 480 mL / OUT: 0 mL / NET: 480 mL      Daily     Daily Weight in k.7 (2022 05:02)  CAPILLARY BLOOD GLUCOSE          PHYSICAL EXAM:    GENERAL:  NAD  SKIN: No rashes or lesions  HENT: Atraumatic. Normocephalic. PERRL. Moist membranes.  NECK: Supple, No JVD. No lymphadenopathy.  PULMONARY: CTA B/L. No wheezing. No rales  CVS: Normal S1, S2. Rate and Rhythm are IRIR. No murmurs.  ABDOMEN/GI: Soft, Nontender, Nondistended; BS present  EXTREMITIES: Peripheral pulses intact. No edema B/L LE.  NEUROLOGIC:  No motor or sensory deficit.  PSYCH: Alert & oriented x 3    Consultant(s) Notes Reviewed:  [x ] YES  [ ] NO  Care Discussed with Consultants/Other Providers [ x] YES  [ ] NO    EKG reviewed  Telemetry reviewed    LABS:                        15.4   12.13 )-----------( 158      ( 2022 07:12 )             44.4     -    140  |  101  |  17  ----------------------------<  124<H>  4.3   |  28  |  1.0    Ca    9.1      2022 07:12  Mg     2.0         TPro  6.4  /  Alb  3.7  /  TBili  0.6  /  DBili  x   /  AST  20  /  ALT  13  /  AlkPhos  91        Serum Pro-Brain Natriuretic Peptide: 1254 pg/mL (22 @ 21:00)    Trop <0.01, CKMB --, CK --, 22 @ 21:00        RADIOLOGY & ADDITIONAL TESTS:    < from: TTE Echo Complete w/o Contrast w/ Doppler (22 @ 10:23) >    Summary:   1. Normal global left ventricular systolic function.   2. Normal left atrial size.   3. Normal right atrial size.   4. No evidence of mitral valve regurgitation.   5. Thickening of the posterior mitral valve leaflet.    < end of copied text >    Imaging or report Personally Reviewed:  [ ] YES  [ ] NO    Medications:  Standing  apixaban 5 milliGRAM(s) Oral every 12 hours  atorvastatin Oral Tab/Cap - Peds 10 milliGRAM(s) Oral daily  chlorhexidine 4% Liquid 1 Application(s) Topical <User Schedule>  metoprolol tartrate 25 milliGRAM(s) Oral two times a day  pantoprazole    Tablet 40 milliGRAM(s) Oral before breakfast    PRN Meds      Case discussed with resident    Care discussed with pt/family

## 2022-06-06 NOTE — CHART NOTE - NSCHARTNOTEFT_GEN_A_CORE
POST OPERATIVE PROCEDURAL DOCUMENTATION  PRE-OP DIAGNOSIS:  AF      POST-OP DIAGNOSIS:  AF s/p successful CV    PROCEDURE: Transesophageal echocardiogram    Primary Physician:  Dr. Butcher  Assistant: Dr. Lundberg    ANESTHESIA TYPE  [  ] General Anesthesia  [ x ] Conscious Sedation  [  ] Local/Regional    CONDITION  [  ] Critical  [  ] Serious  [  ] Fair  [ x ] Good    SPECIMENS REMOVED (IF APPLICABLE): N/A    IMPLANTS (IF APPLICABLE): None    ESTIMATED BLOOD LOSS: None    COMPLICATIONS: None      FINDINGS:    After risks and benefits of procedures were explained, informed consent was obtained and placed in chart. Refer to Anesthesia note for sedation details.  The MARI probe was passed into the esophagus without difficulty.  Transesophageal and transgastric images were obtained.  The MARI probe was removed without difficulty and examined.  There was no evidence for bleeding.  The patient tolerated the procedure well without any immediate MARI-related complications.      Preliminary Findings:  LA:   enlarged  FLORIAN: Left atrial appendage was clear of clot and smoke.  LV: LVEF nL  MV: mild MR, no evidence of MS.   AV: trace AI, sclerotic AV normal opening  RA:   TV: moderate TR.   PV: mild PI.   IAS: no PFO. No R-> L shunt.   Aorta:  simple atheroma of aortic arch / desc aorta      Patient successfully converted to sinus rhythm with synchronized  __200_ J of direct current cardioversion.      DIAGNOSIS/IMPRESSION:  AF s/p successful CV    PLAN OF CARE:  return to floor  f/u with cardiologist POST OPERATIVE PROCEDURAL DOCUMENTATION    PRE-OP DIAGNOSIS:  AF    POST-OP DIAGNOSIS:  AF s/p successful CV    PROCEDURE:  Transesophageal echocardiogram and DC cardioversion    Primary Physician:  Dr. Butcher  Assistant: Dr. Lundberg    ANESTHESIA TYPE  [  ] General Anesthesia  [ x ] Conscious Sedation  [  ] Local/Regional    CONDITION  [  ] Critical  [  ] Serious  [  ] Fair  [ x ] Good    SPECIMENS REMOVED (IF APPLICABLE): N/A    IMPLANTS (IF APPLICABLE): None    ESTIMATED BLOOD LOSS: None    COMPLICATIONS: None      FINDINGS:    After risks and benefits of procedures were explained, informed consent was obtained and placed in chart. Refer to Anesthesia note for sedation details.  The MARI probe was passed into the esophagus without difficulty.  Transesophageal and transgastric images were obtained.  The MARI probe was removed without difficulty and examined.  There was no evidence for bleeding.  The patient tolerated the procedure well without any immediate MARI-related complications.    Preliminary Findings:  No FLORIAN thrombus  Mild MR  Mild TR    Patient successfully converted to sinus rhythm with synchronized  __200_ J of direct current cardioversion.    DIAGNOSIS/IMPRESSION:  AF s/p successful CV    PLAN OF CARE:  Return to floor  Outpatient follow up with Dr. Frazier

## 2022-06-06 NOTE — CONSULT NOTE ADULT - SUBJECTIVE AND OBJECTIVE BOX
Patient is a 71y old  Male who presents with a chief complaint of New onset Afib (06 Jun 2022 09:10)      HPI:  Patient of Dr Frazier, presented by asymptomatic Afib tachycardia, already was seen by EP.  Feels well, no specific complaint, states is active to some degree and does some daily light exercise  no cp, sob, palpitation edema, aware of his HR by his apple watch    71M w/ PMHx of HTN, HLD, Pancreatic CA s/p Whipple procedure (2010), SBO, GERD, and AMY on CPAP presents after apple watch notification of Afib. Pt noted 2 notifications last night while he was working out at home and 3 notifications today while at rest. Pt was asymptomatic during these episodes. Denies fatigue, fever, chills, CP, SOB, cough, wheezing, dizziness, diaphoresis, abd pain, nausea, vomiting, diarrhea, constipation, dysuria and blood in stool or urine.  (03 Jun 2022 15:55)      PAST MEDICAL & SURGICAL HISTORY:  HTN (hypertension)      HLD (hyperlipidemia)      Pancreatic cancer  s/p whipple      AMY on CPAP      S/P hernia repair          PREVIOUS DIAGNOSTIC TESTING:      ECHO  FINDINGS: < from: TTE Echo Complete w/o Contrast w/ Doppler (06.04.22 @ 10:23) >   1. Normal global left ventricular systolic function.   2. Normal left atrial size.   3. Normal right atrial size.   4. No evidence of mitral valve regurgitation.   5. Thickening of the posterior mitral valve leaflet.    < end of copied text >      MEDICATIONS  (STANDING):  apixaban 5 milliGRAM(s) Oral every 12 hours  atorvastatin Oral Tab/Cap - Peds 10 milliGRAM(s) Oral daily  chlorhexidine 4% Liquid 1 Application(s) Topical <User Schedule>  metoprolol tartrate 25 milliGRAM(s) Oral two times a day  pantoprazole    Tablet 40 milliGRAM(s) Oral before breakfast    MEDICATIONS  (PRN):      FAMILY HISTORY:  FHx: cancer  father: Liver ca , Mother: lung ca, Brother: stomach+esophageal ca        SOCIAL HISTORY:  CIGARETTES: no  ALCOHOL: no  DRUGS: no                    REVIEW OF SYSTEMS:  CONSTITUTIONAL: No distress, Looks stable  NECK: No pain   RESPIRATORY: No cough, wheezing, shortness of breath  CARDIOVASCULAR: No chest pain, SOB, palpitations, leg swelling  GASTROINTESTINAL: No abdominal or epigastric pain. No nausea, vomiting, or hematemesis;  No melena.  NEUROLOGICAL: No dizziness, headaches, memory loss, loss of strength  SKIN: No itching, burning, rashes, or lesions   ENDOCRINE: No heat or cold intolerance  MUSCULOSKELETAL: No joint pain,   PSYCHIATRIC: No depression, anxiety,        Vital Signs Last 24 Hrs  T(C): 36.1 (06 Jun 2022 05:02), Max: 36.3 (05 Jun 2022 21:26)  T(F): 96.9 (06 Jun 2022 05:02), Max: 97.3 (05 Jun 2022 21:26)  HR: 72 (06 Jun 2022 05:02) (63 - 80)  BP: 109/64 (06 Jun 2022 05:02) (109/64 - 132/79)  BP(mean): --  RR: 18 (06 Jun 2022 05:02) (18 - 18)  SpO2: --                      PHYSICAL EXAM:  GENERAL: No distress, well developed  HEAD:  Atraumatic, Normocephalic  NECK: Supple, No JVD, No Bruit of either carotid arteries  NERVOUS SYSTEM:  Alert, Awake, Oriented to time, place, person; Normal memory and speech; Normal motor Strength 5/5 B/L upper and lower extremities  CHEST/LUNG: Normal air entry to lung base bilaterally; No wheeze, crackle, rales, rhonchi  HEART: Irregular heart beat, S1, A2, P2, No S3, No gallop, No murmur  ABDOMEN: Soft, Non tender, Non distended; Bowel sounds present  EXTREMITIES:  1+ Peripheral Pulses, No clubbing, No edema  SKIN: No rashes or lesions    TELEMETRY: Afib occasions of tachycardia     ECG: < from: 12 Lead ECG (06.03.22 @ 11:59) >  Atrial fibrillation  Abnormal ECG    < end of copied text >      I&O's Detail    05 Jun 2022 07:01  -  06 Jun 2022 07:00  --------------------------------------------------------  IN:    Oral Fluid: 480 mL  Total IN: 480 mL    OUT:  Total OUT: 0 mL    Total NET: 480 mL          LABS:                        15.2   12.07 )-----------( 144      ( 06 Jun 2022 07:50 )             44.6     06-06    140  |  104  |  21<H>  ----------------------------<  110<H>  4.8   |  26  |  1.0    Ca    9.2      06 Jun 2022 07:50  Mg     1.9     06-06    TPro  6.2  /  Alb  3.8  /  TBili  0.7  /  DBili  x   /  AST  17  /  ALT  12  /  AlkPhos  89  06-06            I&O's Summary    05 Jun 2022 07:01  -  06 Jun 2022 07:00  --------------------------------------------------------  IN: 480 mL / OUT: 0 mL / NET: 480 mL        RADIOLOGY & ADDITIONAL STUDIES: < from: TTE Echo Complete w/o Contrast w/ Doppler (06.04.22 @ 10:23) >   1. Normal global left ventricular systolic function.   2. Normal left atrial size.   3. Normal right atrial size.   4. No evidence of mitral valve regurgitation.   5. Thickening of the posterior mitral valve leaflet.    < end of copied text >

## 2022-06-07 ENCOUNTER — TRANSCRIPTION ENCOUNTER (OUTPATIENT)
Age: 72
End: 2022-06-07

## 2022-06-07 VITALS
SYSTOLIC BLOOD PRESSURE: 127 MMHG | TEMPERATURE: 97 F | DIASTOLIC BLOOD PRESSURE: 70 MMHG | RESPIRATION RATE: 18 BRPM | HEART RATE: 65 BPM

## 2022-06-07 LAB
ALBUMIN SERPL ELPH-MCNC: 3.7 G/DL — SIGNIFICANT CHANGE UP (ref 3.5–5.2)
ALP SERPL-CCNC: 89 U/L — SIGNIFICANT CHANGE UP (ref 30–115)
ALT FLD-CCNC: 14 U/L — SIGNIFICANT CHANGE UP (ref 0–41)
ANION GAP SERPL CALC-SCNC: 10 MMOL/L — SIGNIFICANT CHANGE UP (ref 7–14)
AST SERPL-CCNC: 17 U/L — SIGNIFICANT CHANGE UP (ref 0–41)
BILIRUB SERPL-MCNC: 0.6 MG/DL — SIGNIFICANT CHANGE UP (ref 0.2–1.2)
BUN SERPL-MCNC: 18 MG/DL — SIGNIFICANT CHANGE UP (ref 10–20)
CALCIUM SERPL-MCNC: 8.7 MG/DL — SIGNIFICANT CHANGE UP (ref 8.5–10.1)
CHLORIDE SERPL-SCNC: 107 MMOL/L — SIGNIFICANT CHANGE UP (ref 98–110)
CO2 SERPL-SCNC: 27 MMOL/L — SIGNIFICANT CHANGE UP (ref 17–32)
CREAT SERPL-MCNC: 0.9 MG/DL — SIGNIFICANT CHANGE UP (ref 0.7–1.5)
EGFR: 90 ML/MIN/1.73M2 — SIGNIFICANT CHANGE UP
GLUCOSE SERPL-MCNC: 105 MG/DL — HIGH (ref 70–99)
HCT VFR BLD CALC: 42.1 % — SIGNIFICANT CHANGE UP (ref 42–52)
HGB BLD-MCNC: 14.4 G/DL — SIGNIFICANT CHANGE UP (ref 14–18)
MAGNESIUM SERPL-MCNC: 2 MG/DL — SIGNIFICANT CHANGE UP (ref 1.8–2.4)
MCHC RBC-ENTMCNC: 29 PG — SIGNIFICANT CHANGE UP (ref 27–31)
MCHC RBC-ENTMCNC: 34.2 G/DL — SIGNIFICANT CHANGE UP (ref 32–37)
MCV RBC AUTO: 84.9 FL — SIGNIFICANT CHANGE UP (ref 80–94)
NRBC # BLD: 0 /100 WBCS — SIGNIFICANT CHANGE UP (ref 0–0)
PLATELET # BLD AUTO: 143 K/UL — SIGNIFICANT CHANGE UP (ref 130–400)
POTASSIUM SERPL-MCNC: 4.2 MMOL/L — SIGNIFICANT CHANGE UP (ref 3.5–5)
POTASSIUM SERPL-SCNC: 4.2 MMOL/L — SIGNIFICANT CHANGE UP (ref 3.5–5)
PROT SERPL-MCNC: 5.9 G/DL — LOW (ref 6–8)
RBC # BLD: 4.96 M/UL — SIGNIFICANT CHANGE UP (ref 4.7–6.1)
RBC # FLD: 13.5 % — SIGNIFICANT CHANGE UP (ref 11.5–14.5)
SODIUM SERPL-SCNC: 144 MMOL/L — SIGNIFICANT CHANGE UP (ref 135–146)
WBC # BLD: 10.57 K/UL — SIGNIFICANT CHANGE UP (ref 4.8–10.8)
WBC # FLD AUTO: 10.57 K/UL — SIGNIFICANT CHANGE UP (ref 4.8–10.8)

## 2022-06-07 PROCEDURE — 99239 HOSP IP/OBS DSCHRG MGMT >30: CPT

## 2022-06-07 RX ORDER — APIXABAN 2.5 MG/1
1 TABLET, FILM COATED ORAL
Qty: 60 | Refills: 0
Start: 2022-06-07 | End: 2022-07-06

## 2022-06-07 RX ORDER — METOPROLOL TARTRATE 50 MG
1 TABLET ORAL
Qty: 0 | Refills: 0 | DISCHARGE
Start: 2022-06-07

## 2022-06-07 RX ORDER — METOPROLOL TARTRATE 50 MG
1 TABLET ORAL
Qty: 60 | Refills: 0
Start: 2022-06-07 | End: 2022-07-06

## 2022-06-07 RX ADMIN — CHLORHEXIDINE GLUCONATE 1 APPLICATION(S): 213 SOLUTION TOPICAL at 05:54

## 2022-06-07 RX ADMIN — APIXABAN 5 MILLIGRAM(S): 2.5 TABLET, FILM COATED ORAL at 05:55

## 2022-06-07 RX ADMIN — Medication 25 MILLIGRAM(S): at 05:54

## 2022-06-07 RX ADMIN — PANTOPRAZOLE SODIUM 40 MILLIGRAM(S): 20 TABLET, DELAYED RELEASE ORAL at 05:54

## 2022-06-07 NOTE — DISCHARGE NOTE PROVIDER - NSDCMRMEDTOKEN_GEN_ALL_CORE_FT
amLODIPine 10 mg oral tablet: 1 tab(s) orally once a day  atorvastatin 10 mg oral tablet: 1 tab(s) orally once a day  valsartan-hydrochlorothiazide 320mg-25mg oral tablet: 1 tab(s) orally once a day   amLODIPine 10 mg oral tablet: 1 tab(s) orally once a day  atorvastatin 10 mg oral tablet: 1 tab(s) orally once a day  Eliquis 5 mg oral tablet: 1 tab(s) orally every 12 hours  metoprolol tartrate 25 mg oral tablet: 1 tab(s) orally 2 times a day  valsartan-hydrochlorothiazide 320mg-25mg oral tablet: 1 tab(s) orally once a day

## 2022-06-07 NOTE — DISCHARGE NOTE PROVIDER - PROVIDER TOKENS
PROVIDER:[TOKEN:[[id number]],FOLLOWUP:[1 week]],PROVIDER:[TOKEN:[[id number]],FOLLOWUP:[1 week]]

## 2022-06-07 NOTE — DISCHARGE NOTE PROVIDER - NSDCCPCAREPLAN_GEN_ALL_CORE_FT
PRINCIPAL DISCHARGE DIAGNOSIS  Diagnosis: New onset atrial fibrillation  Assessment and Plan of Treatment: Atrial fibrillation is a type of irregular heartbeat (arrhythmia) where the heart quivers continuously in a chaotic pattern that makes the heart unable to pump blood normally. This can increase the risk for stroke, heart failure, and other heart-related conditions. Atrial fibrillation can be caused by a variety of conditions and may be temporary, intermittent, or permanent. Symptoms include feeling that your heart is beating rapidly or irregularly, chest discomfort, shortness of breath, or dizziness/lightheadedness that may be worse with exertion. You were treated with cardioversion (electrical shock) which restored your heart back into a normal rhythm. Please take your medications as directed and followup with Dr. Frazier.   SEEK IMMEDIATE MEDICAL CARE IF YOU HAVE ANY OF THE FOLLOWING SYMPTOMS: chest pain, shortness of breath, abdominal pain, sweating, vomiting, blood in vomit/bowel movements/urine, dizziness/lightheadedness, weakness or numbness to face/arm/leg, trouble speaking or understanding, facial droop.

## 2022-06-07 NOTE — PROGRESS NOTE ADULT - SUBJECTIVE AND OBJECTIVE BOX
EVELIO LLAMAS  71y Male    CHIEF COMPLAINT:    Patient is a 71y old  Male who presents with a chief complaint of New onset Afib (2022 10:52)      INTERVAL HPI/OVERNIGHT EVENTS:    Patient seen and examined. S/P CV on . In NSR now. Feels good. No palpitations. No sob    ROS: All other systems are negative.    Vital Signs:    T(F): 96.2 (22 @ 05:17), Max: 97.2 (22 @ 14:22)  HR: 73 (22 @ 05:17) (72 - 73)  BP: 120/60 (22 @ 05:17) (120/60 - 123/75)  RR: 20 (22 @ 09:28) (18 - 20)  SpO2: 97% (22 @ 09:28) (97% - 97%)  I&O's Summary    2022 07:01  -  2022 11:55  --------------------------------------------------------  IN: 200 mL / OUT: 300 mL / NET: -100 mL      Daily Height in cm: 177.8 (2022 13:20)    Daily Weight in k.2 (2022 05:17)  CAPILLARY BLOOD GLUCOSE          PHYSICAL EXAM:    GENERAL:  NAD  SKIN: No rashes or lesions  HENT: Atraumatic. Normocephalic. PERRL. Moist membranes.  NECK: Supple, No JVD. No lymphadenopathy.  PULMONARY: CTA B/L. No wheezing. No rales  CVS: Normal S1, S2. Rate and Rhythm are regular. No murmurs.  ABDOMEN/GI: Soft, Nontender, Nondistended; BS present  EXTREMITIES: Peripheral pulses intact. No edema B/L LE.  NEUROLOGIC:  No motor or sensory deficit.  PSYCH: Alert & oriented x 3    Consultant(s) Notes Reviewed:  [x ] YES  [ ] NO  Care Discussed with Consultants/Other Providers [ x] YES  [ ] NO    EKG reviewed  Telemetry reviewed    LABS:                        14.4   10.57 )-----------( 143      ( 2022 06:16 )             42.1     -    144  |  107  |  18  ----------------------------<  105<H>  4.2   |  27  |  0.9    Ca    8.7      2022 06:16  Mg     2.0     -07    TPro  5.9<L>  /  Alb  3.7  /  TBili  0.6  /  DBili  x   /  AST  17  /  ALT  14  /  AlkPhos  89  -      Serum Pro-Brain Natriuretic Peptide: 1254 pg/mL (22 @ 21:00)          RADIOLOGY & ADDITIONAL TESTS:      Imaging or report Personally Reviewed:  [ ] YES  [ ] NO    Medications:  Standing  apixaban 5 milliGRAM(s) Oral every 12 hours  atorvastatin Oral Tab/Cap - Peds 10 milliGRAM(s) Oral daily  chlorhexidine 4% Liquid 1 Application(s) Topical <User Schedule>  metoprolol tartrate 25 milliGRAM(s) Oral two times a day  pantoprazole    Tablet 40 milliGRAM(s) Oral before breakfast    PRN Meds      Case discussed with resident    Care discussed with pt/family

## 2022-06-07 NOTE — DISCHARGE NOTE PROVIDER - CARE PROVIDER_API CALL
John Frazier (MD)  Cardiovascular Disease; Interventional Cardiology  [address]  [place] [address]  Phone: [phone]  Fax: [phone]  Follow Up Time: 1 week    Fredrick Soto)  Internal Medicine  [address] [place] [address]  Phone: [phone]  Fax: [phone]  Follow Up Time: 1 week

## 2022-06-07 NOTE — PROGRESS NOTE ADULT - ASSESSMENT
71M w/ PMHx of HTN, HLD, Pancreatic CA s/p Whipple procedure (2010), SBO, GERD, and AMY on CPAP presents after apple watch notification of Afib.    PAF  HTN / DL  H/O Pancreatic Ca s/p Whipple procedure (2010)  AMY on CPAP            PLAN:    ·	S/V on 6/6. In NSR now.  ·	ECHO showed NLVF  ·	Cont Eliquis 5 mg po q 12h  ·	Cont Metoprolol 25 mg po q 12h  ·	Cont his other meds.   ·	D/C home    * Med rec reviewed. Plan of care d/w the pt. Time spent 31 minutes. D/C home.

## 2022-06-07 NOTE — DISCHARGE NOTE PROVIDER - NSDCFUSCHEDAPPT_GEN_ALL_CORE_FT
Eugenia Holguin  [facility] Physician [facility]  CARDIOLOGY [address] [facility]  Scheduled Appointment: 07/06/2022

## 2022-06-07 NOTE — PROGRESS NOTE ADULT - SUBJECTIVE AND OBJECTIVE BOX
HPI:  71M w/ PMHx of HTN, HLD, Pancreatic CA s/p Whipple procedure (), SBO, GERD, and AMY on CPAP presents after apple watch notification of Afib. Pt noted 2 notifications last night while he was working out at home and 3 notifications today while at rest. Pt was asymptomatic during these episodes. Denies fatigue, fever, chills, CP, SOB, cough, wheezing, dizziness, diaphoresis, abd pain, nausea, vomiting, diarrhea, constipation, dysuria and blood in stool or urine.  (2022 15:55)      INTERVAL HISTORY: s/p DCCV     PAST MEDICAL & SURGICAL HISTORY  HTN (hypertension)    HLD (hyperlipidemia)    Pancreatic cancer  s/p whipple    AMY on CPAP    S/P hernia repair      ALLERGIES:  No Known Allergies    MEDICATIONS:  MEDICATIONS  (STANDING):  apixaban 5 milliGRAM(s) Oral every 12 hours  atorvastatin Oral Tab/Cap - Peds 10 milliGRAM(s) Oral daily  chlorhexidine 4% Liquid 1 Application(s) Topical <User Schedule>  metoprolol tartrate 25 milliGRAM(s) Oral two times a day  pantoprazole    Tablet 40 milliGRAM(s) Oral before breakfast    HOME MEDICATIONS:  Home Medications:  amLODIPine 10 mg oral tablet: 1 tab(s) orally once a day (2022 16:20)  atorvastatin 10 mg oral tablet: 1 tab(s) orally once a day (2022 16:20)  valsartan-hydrochlorothiazide 320mg-25mg oral tablet: 1 tab(s) orally once a day (2022 16:20)    OBJECTIVE:  ICU Vital Signs Last 24 Hrs  T(C): 35.7 (2022 05:17), Max: 36.2 (2022 14:22)  T(F): 96.2 (2022 05:17), Max: 97.2 (2022 14:22)  HR: 73 (2022 05:17) (72 - 73)  BP: 120/60 (2022 05:17) (120/60 - 123/75)  BP(mean): --  ABP: --  ABP(mean): --  RR: 20 (2022 09:28) (18 - 20)  SpO2: 97% (2022 09:28) (97% - 97%)    2022 07:01  -  2022 10:52  --------------------------------------------------------  IN: 200 mL / OUT: 300 mL / NET: -100 mL      Daily Height in cm: 177.8 (2022 13:20)    Daily Weight in k.2 (2022 05:17)    PHYSICAL EXAM:  NEURO: patient is awake , alert and oriented  GEN: Not in acute distress  NECK: no thyroid enlargement, no JVD  LUNGS: Clear to auscultation bilaterally   CARDIOVASCULAR: S1/S2 present, RRR , no murmurs or rubs, no carotid bruits,  + PP bilaterally  ABD: Soft, non-tender, non-distended, +BS  EXT: No MARY  SKIN: Intact  ACCESS Site:    LABS:                        14.4   10.57 )-----------( 143      ( 2022 06:16 )             42.1     06-07    144  |  107  |  18  ----------------------------<  105<H>  4.2   |  27  |  0.9    Ca    8.7      2022 06:16  Mg     2.0     06-07    TPro  5.9<L>  /  Alb  3.7  /  TBili  0.6  /  DBili  x   /  AST  17  /  ALT  14  /  AlkPhos  89  06-07      RADIOLOGY:  -CXR:  -TTE:  < from: Transesophageal Echocardiogram (22 @ 13:33) >  Summary:   1. Successful DCCV 200 J x 1 to SR.   2. No left atrial appendage thrombus and decreased left atrial appendage   velocities.   3. Left ventricular ejection fraction, by visual estimation, is 55-65%.   4. Normal global left ventricular systolic function.   5.Sclerotic trileaflet aortic valve.   6. No evidence of aortic stenosis.   7. Trivial aortic regurgitation.   8. Mild mitral regurgitation.   9. Mild tricuspid regurgitation.    < end of copied text >    -STRESS TEST:  -CATHETERIZATION:    ECG:    TELEMETRY EVENTS: sinus rhythm

## 2022-06-07 NOTE — DISCHARGE NOTE NURSING/CASE MANAGEMENT/SOCIAL WORK - PATIENT PORTAL LINK FT
You can access the FollowMyHealth Patient Portal offered by [facility] by registering at the following website: [email or web address]. By joining [employer]’s FollowMyHealth portal, you will also be able to view your health information using other applications (apps) compatible with our system.

## 2022-06-07 NOTE — DISCHARGE NOTE PROVIDER - HOSPITAL COURSE
71M w/ PMHx of HTN, HLD, Pancreatic CA s/p Whipple procedure (2010), SBO, GERD, and AMY on CPAP presents after apple watch notification of Afib. Pt noted 2 notifications last night while he was working out at home and 3 notifications today while at rest. Pt was asymptomatic during these episodes. Denies fatigue, fever, chills, CP, SOB, cough, wheezing, dizziness, diaphoresis, abd pain, nausea, vomiting, diarrhea, constipation, dysuria and blood in stool or urine.    S/p MARI ( no FLORIAN) and DCCV with successful cardioversion. Will discharge with lopressor, eliquis and statin. Pt stable for discharge and will f/u outpatient with Dr. Frazier.

## 2022-06-07 NOTE — DISCHARGE NOTE NURSING/CASE MANAGEMENT/SOCIAL WORK - NSDCPEFALRISK_GEN_ALL_CORE
For information on Fall & Injury Prevention, visit: [email or web address] OR  [email or web address] OR  https://www.cdc.gov/steadi/patient.html

## 2022-06-07 NOTE — PROGRESS NOTE ADULT - ASSESSMENT
New onset Afib  HTN  DLD    -s/p MARI/DCCV with successful restoration of sinus rhythm  -can d/c home from cardiac standpoint  -c/w apixaban   -c/w metoprolol  -outpatient follow up with Dr. Frazier

## 2022-06-14 DIAGNOSIS — I48.0 PAROXYSMAL ATRIAL FIBRILLATION: ICD-10-CM

## 2022-06-14 DIAGNOSIS — Z87.891 PERSONAL HISTORY OF NICOTINE DEPENDENCE: ICD-10-CM

## 2022-06-14 DIAGNOSIS — E66.9 OBESITY, UNSPECIFIED: ICD-10-CM

## 2022-06-14 DIAGNOSIS — Z85.07 PERSONAL HISTORY OF MALIGNANT NEOPLASM OF PANCREAS: ICD-10-CM

## 2022-06-14 DIAGNOSIS — G47.33 OBSTRUCTIVE SLEEP APNEA (ADULT) (PEDIATRIC): ICD-10-CM

## 2022-06-14 DIAGNOSIS — E78.5 HYPERLIPIDEMIA, UNSPECIFIED: ICD-10-CM

## 2022-06-14 DIAGNOSIS — Z80.0 FAMILY HISTORY OF MALIGNANT NEOPLASM OF DIGESTIVE ORGANS: ICD-10-CM

## 2022-06-14 DIAGNOSIS — Z71.3 DIETARY COUNSELING AND SURVEILLANCE: ICD-10-CM

## 2022-06-14 DIAGNOSIS — Z91.19 PATIENT'S NONCOMPLIANCE WITH OTHER MEDICAL TREATMENT AND REGIMEN: ICD-10-CM

## 2022-06-14 DIAGNOSIS — Z99.89 DEPENDENCE ON OTHER ENABLING MACHINES AND DEVICES: ICD-10-CM

## 2022-06-14 DIAGNOSIS — I10 ESSENTIAL (PRIMARY) HYPERTENSION: ICD-10-CM

## 2022-06-14 DIAGNOSIS — Z79.891 LONG TERM (CURRENT) USE OF OPIATE ANALGESIC: ICD-10-CM

## 2022-06-14 DIAGNOSIS — Z80.2 FAMILY HISTORY OF MALIGNANT NEOPLASM OF OTHER RESPIRATORY AND INTRATHORACIC ORGANS: ICD-10-CM

## 2022-06-14 DIAGNOSIS — K21.9 GASTRO-ESOPHAGEAL REFLUX DISEASE WITHOUT ESOPHAGITIS: ICD-10-CM

## 2022-06-14 DIAGNOSIS — I25.811 ATHEROSCLEROSIS OF NATIVE CORONARY ARTERY OF TRANSPLANTED HEART WITHOUT ANGINA PECTORIS: ICD-10-CM

## 2022-06-14 DIAGNOSIS — Z28.310 UNVACCINATED FOR COVID-19: ICD-10-CM

## 2022-07-06 ENCOUNTER — APPOINTMENT (OUTPATIENT)
Dept: CARDIOLOGY | Facility: CLINIC | Age: 72
End: 2022-07-06

## 2022-07-06 VITALS
WEIGHT: 232 LBS | HEART RATE: 62 BPM | HEIGHT: 70 IN | BODY MASS INDEX: 33.21 KG/M2 | TEMPERATURE: 97.8 F | DIASTOLIC BLOOD PRESSURE: 79 MMHG | SYSTOLIC BLOOD PRESSURE: 128 MMHG

## 2022-07-06 DIAGNOSIS — I25.10 ATHEROSCLEROTIC HEART DISEASE OF NATIVE CORONARY ARTERY W/OUT ANGINA PECTORIS: ICD-10-CM

## 2022-07-06 PROCEDURE — 93000 ELECTROCARDIOGRAM COMPLETE: CPT

## 2022-07-06 PROCEDURE — 99215 OFFICE O/P EST HI 40 MIN: CPT

## 2022-07-06 RX ORDER — AZELASTINE HYDROCHLORIDE 137 UG/1
0.1 SPRAY, METERED NASAL
Qty: 30 | Refills: 0 | Status: COMPLETED | COMMUNITY
Start: 2020-10-06 | End: 2022-07-06

## 2022-07-06 NOTE — CARDIOLOGY SUMMARY
[de-identified] : 7/6/2022 NSR (HR 63 bpm), PAC [de-identified] : 6/4/2022  \par 1. Normal global left ventricular systolic function.\par  2. Normal left atrial size.\par  3. Normal right atrial size.\par  4. No evidence of mitral valve regurgitation.\par  5. Thickening of the posterior mitral valve leaflet.

## 2022-07-06 NOTE — ASSESSMENT
[FreeTextEntry1] : # Paroxysmal AFib s/p CV (6/2022)\par - Currently in NSR\par - Cont Eliquis for CHADS VASc of at least 3 (HTN, Age > 65, CAD)\par - Has colonoscopy planned for later this year and has follow up with Dr. Frazier in the fall\par \par # HTN\par - BP well controlled\par - Cont Amlodipine, Metoprolol, Valsartan-HCTZ\par - 2g Na diet enforced\par \par # AMY on CPAP\par - Cont with compliance\par \par I have also advised the patient to go to the nearest emergency room if he experiences any chest pain, dyspnea, syncope, or has any other compelling symptoms.\par \par Follow up in 6-9 mo or sooner if he has recurrence

## 2022-07-06 NOTE — DISCUSSION/SUMMARY
[FreeTextEntry1] : We had an extensive conversation regarding the nature of atrial fibrillation, including potential etiologies, underlying pathophysiology and natural history of the disease. In addition, the potential risk of thromboembolic events and assessment of that risk were discussed. I have emphasized the importance of continuing anticoagulation. \par \par In addition, the maintenance of sinus rhythm along with adjuvant antiarrhythmic agents and catheter ablation therapy were discussed. The rationale for and risks of ablation therapy were discussed, including but not limited to bleeding, vascular injury, groin complications, cardiac perforation and tamponade, stroke, esophageal injury, pulmonary vein stenosis, need for pacemaker, need for cardiac surgery, and death. In addition, the long-term and ongoing nature of this therapy were also discussed, including the critical role of continued monitoring post-ablation and the potential for the necessity of repeat ablation procedures to definitively treat the condition. \par \par The patient verbalized understanding of the discussion and all questions were addressed and answered. The patient would like to DEFER ablation.

## 2022-07-06 NOTE — HISTORY OF PRESENT ILLNESS
[FreeTextEntry1] : \par Cardiologist: Dr. Frazier\par Pulmonologist: Dr. Brown\par \par 72 yo M w/ h/o AMY c/w CPAP, HTN, HL, non-obstructive CAD, pancreatic cancer s/p Whipple (2010), GERD, admitted 6/3-6/722 for new onset AF detected on his Apple watch. He was completely asymptomatic. Underwent successful MARI/CV 6/6/2022. Has not received any more notifications about AF from his watch. Also has [employer] mobile and has not had any more AF. \par \par He denies any cardiovascular complaints including chest pain, dyspnea, palpitations, dizziness, lightheadedness, presyncope or syncope.

## 2022-07-06 NOTE — PHYSICAL EXAM
[Well Developed] : well developed [Well Nourished] : well nourished [No Acute Distress] : no acute distress [Normal Conjunctiva] : normal conjunctiva [Normal Venous Pressure] : normal venous pressure [Normal S1, S2] : normal S1, S2 [No Murmur] : no murmur [Clear Lung Fields] : clear lung fields [Good Air Entry] : good air entry [No Respiratory Distress] : no respiratory distress  [Soft] : abdomen soft [Normal Gait] : normal gait [No Edema] : no edema [No Rash] : no rash [Moves all extremities] : moves all extremities [Normal Speech] : normal speech [Alert and Oriented] : alert and oriented [Normal memory] : normal memory [Obese] : obese

## 2022-11-29 ENCOUNTER — APPOINTMENT (OUTPATIENT)
Age: 72
End: 2022-11-29

## 2022-11-29 PROCEDURE — 94727 GAS DIL/WSHOT DETER LNG VOL: CPT

## 2022-11-29 PROCEDURE — 94010 BREATHING CAPACITY TEST: CPT

## 2022-11-29 PROCEDURE — 94729 DIFFUSING CAPACITY: CPT

## 2022-11-30 ENCOUNTER — APPOINTMENT (OUTPATIENT)
Dept: PULMONOLOGY | Facility: CLINIC | Age: 72
End: 2022-11-30

## 2022-11-30 VITALS
BODY MASS INDEX: 34.22 KG/M2 | SYSTOLIC BLOOD PRESSURE: 160 MMHG | WEIGHT: 239 LBS | HEIGHT: 70 IN | OXYGEN SATURATION: 98 % | DIASTOLIC BLOOD PRESSURE: 72 MMHG | HEART RATE: 68 BPM

## 2022-11-30 PROCEDURE — 99213 OFFICE O/P EST LOW 20 MIN: CPT

## 2023-01-01 NOTE — ED PROVIDER NOTE - CCCP TRG CHIEF CMPLNT
(BREASTMILK.   - LET'S DO A WEIGHT CHECK AT 1 MONTH OF AGE TO SEE IF WE CAN LET GO OF THE SUPPLEMENTING  (2) DRY SKIN  - HUMIDIFIER IN THE BEDROOM  - PLAIN WHITE CREAM (CERAVE, EUCERIN, CETAPHIL, AVEENO) AND TOP WITH AQUAPHOR   heart beat rapid, irregular

## 2023-04-05 ENCOUNTER — APPOINTMENT (OUTPATIENT)
Dept: ELECTROPHYSIOLOGY | Facility: CLINIC | Age: 73
End: 2023-04-05
Payer: MEDICARE

## 2023-04-05 VITALS
WEIGHT: 237 LBS | SYSTOLIC BLOOD PRESSURE: 140 MMHG | DIASTOLIC BLOOD PRESSURE: 90 MMHG | HEART RATE: 61 BPM | BODY MASS INDEX: 34.01 KG/M2

## 2023-04-05 PROCEDURE — 93000 ELECTROCARDIOGRAM COMPLETE: CPT

## 2023-04-05 PROCEDURE — 99214 OFFICE O/P EST MOD 30 MIN: CPT

## 2023-04-05 RX ORDER — PANCRELIPASE LIPASE, PANCRELIPASE PROTEASE, PANCRELIPASE AMYLASE 15000; 47000; 63000 [USP'U]/1; [USP'U]/1; [USP'U]/1
15000-47000 CAPSULE, DELAYED RELEASE ORAL
Qty: 180 | Refills: 0 | Status: ACTIVE | COMMUNITY
Start: 2020-09-01

## 2023-04-05 NOTE — DISCUSSION/SUMMARY
[FreeTextEntry1] : We had an extensive conversation regarding the nature of atrial fibrillation, including potential etiologies, underlying pathophysiology and natural history of the disease. In addition, the potential risk of thromboembolic events and assessment of that risk were discussed. I have emphasized the importance of continuing anticoagulation.  [EKG obtained to assist in diagnosis and management of assessed problem(s)] : EKG obtained to assist in diagnosis and management of assessed problem(s)

## 2023-04-05 NOTE — ASSESSMENT
[FreeTextEntry1] : # Paroxysmal AFib s/p CV (6/2022)\par - Currently in NSR. Patient denies any symptoms when in AF. He also states he has not had any further episodes of AF according to apple watch or [employer]. \par - Cont Eliquis 5mg PO BID for CHADS VASc of at least 3 (HTN, Age > 65, CAD). Denies s/sx of bleeding. Recent labs from Nov 2022 reviewed. Hg 15.4. Cr 0.8\par - Pt wishes to defer ablation at this time, which is reasonable. \par \par # HTN\par - BP mildly elevated\par - Cont Amlodipine, Metoprolol, Valsartan-HCTZ\par - 2g Na diet enforced\par \par # AMY on CPAP\par - Cont with compliance\par \par I have also advised the patient to go to the nearest emergency room if he experiences any chest pain, dyspnea, syncope, or has any other compelling symptoms.\par \par Follow up in 6-9 mo or sooner if he has recurrence.

## 2023-04-05 NOTE — CARDIOLOGY SUMMARY
[de-identified] : 4/5/2023 NSR (HR 61 bpm)\par 7/6/2022 NSR (HR 63 bpm), PAC [de-identified] : 6/4/2022  \par 1. Normal global left ventricular systolic function.\par  2. Normal left atrial size.\par  3. Normal right atrial size.\par  4. No evidence of mitral valve regurgitation.\par  5. Thickening of the posterior mitral valve leaflet.

## 2023-06-28 ENCOUNTER — APPOINTMENT (OUTPATIENT)
Dept: PULMONOLOGY | Facility: CLINIC | Age: 73
End: 2023-06-28
Payer: MEDICARE

## 2023-06-28 VITALS
OXYGEN SATURATION: 98 % | HEART RATE: 70 BPM | BODY MASS INDEX: 34.22 KG/M2 | WEIGHT: 239 LBS | SYSTOLIC BLOOD PRESSURE: 130 MMHG | DIASTOLIC BLOOD PRESSURE: 70 MMHG | HEIGHT: 70 IN

## 2023-06-28 PROCEDURE — 99213 OFFICE O/P EST LOW 20 MIN: CPT

## 2023-06-28 NOTE — HISTORY OF PRESENT ILLNESS
[TextBox_4] : His machine was recalled and he has a new machine he has been using it for couple of months getting benefit from an compliance report that the patient about the reviewed was excellent.  Discussed about the humidifier adjustment on the machine.  And he said he liked the small facemask he just ordered a couple.\par Said also recently he was in A-fib and now is back to normal sinus rhythm

## 2023-10-23 ENCOUNTER — RESULT REVIEW (OUTPATIENT)
Age: 73
End: 2023-10-23

## 2023-10-23 ENCOUNTER — OUTPATIENT (OUTPATIENT)
Dept: OUTPATIENT SERVICES | Facility: HOSPITAL | Age: 73
LOS: 1 days | End: 2023-10-23
Payer: MEDICARE

## 2023-10-23 DIAGNOSIS — Z98.890 OTHER SPECIFIED POSTPROCEDURAL STATES: Chronic | ICD-10-CM

## 2023-10-23 DIAGNOSIS — M25.562 PAIN IN LEFT KNEE: ICD-10-CM

## 2023-10-23 DIAGNOSIS — M25.561 PAIN IN RIGHT KNEE: ICD-10-CM

## 2023-10-23 PROCEDURE — 73562 X-RAY EXAM OF KNEE 3: CPT | Mod: 50

## 2023-10-23 PROCEDURE — 73562 X-RAY EXAM OF KNEE 3: CPT | Mod: 26,50

## 2023-10-23 PROCEDURE — 73522 X-RAY EXAM HIPS BI 3-4 VIEWS: CPT

## 2023-10-23 PROCEDURE — 73522 X-RAY EXAM HIPS BI 3-4 VIEWS: CPT | Mod: 26

## 2023-10-24 DIAGNOSIS — M25.562 PAIN IN LEFT KNEE: ICD-10-CM

## 2023-10-24 DIAGNOSIS — M25.561 PAIN IN RIGHT KNEE: ICD-10-CM

## 2023-11-13 ENCOUNTER — RESULT REVIEW (OUTPATIENT)
Age: 73
End: 2023-11-13

## 2023-11-13 ENCOUNTER — OUTPATIENT (OUTPATIENT)
Dept: OUTPATIENT SERVICES | Facility: HOSPITAL | Age: 73
LOS: 1 days | End: 2023-11-13
Payer: MEDICARE

## 2023-11-13 DIAGNOSIS — Z00.8 ENCOUNTER FOR OTHER GENERAL EXAMINATION: ICD-10-CM

## 2023-11-13 DIAGNOSIS — Z98.890 OTHER SPECIFIED POSTPROCEDURAL STATES: Chronic | ICD-10-CM

## 2023-11-13 DIAGNOSIS — M16.11 UNILATERAL PRIMARY OSTEOARTHRITIS, RIGHT HIP: ICD-10-CM

## 2023-11-13 PROCEDURE — 27093 INJECTION FOR HIP X-RAY: CPT | Mod: RT

## 2023-11-13 PROCEDURE — 73525 CONTRAST X-RAY OF HIP: CPT | Mod: RT

## 2023-11-13 PROCEDURE — 73525 CONTRAST X-RAY OF HIP: CPT | Mod: 26,RT

## 2023-11-14 DIAGNOSIS — M16.11 UNILATERAL PRIMARY OSTEOARTHRITIS, RIGHT HIP: ICD-10-CM

## 2024-01-10 ENCOUNTER — APPOINTMENT (OUTPATIENT)
Dept: PULMONOLOGY | Facility: CLINIC | Age: 74
End: 2024-01-10
Payer: MEDICARE

## 2024-01-10 VITALS
OXYGEN SATURATION: 96 % | WEIGHT: 227 LBS | BODY MASS INDEX: 32.5 KG/M2 | DIASTOLIC BLOOD PRESSURE: 80 MMHG | HEIGHT: 70 IN | SYSTOLIC BLOOD PRESSURE: 122 MMHG | HEART RATE: 71 BPM

## 2024-01-10 PROCEDURE — 99213 OFFICE O/P EST LOW 20 MIN: CPT

## 2024-02-13 ENCOUNTER — OUTPATIENT (OUTPATIENT)
Dept: OUTPATIENT SERVICES | Facility: HOSPITAL | Age: 74
LOS: 1 days | End: 2024-02-13
Payer: MEDICARE

## 2024-02-13 ENCOUNTER — RESULT REVIEW (OUTPATIENT)
Age: 74
End: 2024-02-13

## 2024-02-13 VITALS
WEIGHT: 229.94 LBS | OXYGEN SATURATION: 96 % | SYSTOLIC BLOOD PRESSURE: 152 MMHG | HEIGHT: 70 IN | RESPIRATION RATE: 16 BRPM | HEART RATE: 71 BPM | DIASTOLIC BLOOD PRESSURE: 78 MMHG | TEMPERATURE: 99 F

## 2024-02-13 DIAGNOSIS — M16.11 UNILATERAL PRIMARY OSTEOARTHRITIS, RIGHT HIP: ICD-10-CM

## 2024-02-13 DIAGNOSIS — Z01.818 ENCOUNTER FOR OTHER PREPROCEDURAL EXAMINATION: ICD-10-CM

## 2024-02-13 DIAGNOSIS — Z92.89 PERSONAL HISTORY OF OTHER MEDICAL TREATMENT: Chronic | ICD-10-CM

## 2024-02-13 DIAGNOSIS — Z90.410 ACQUIRED TOTAL ABSENCE OF PANCREAS: Chronic | ICD-10-CM

## 2024-02-13 DIAGNOSIS — Z98.890 OTHER SPECIFIED POSTPROCEDURAL STATES: Chronic | ICD-10-CM

## 2024-02-13 LAB
A1C WITH ESTIMATED AVERAGE GLUCOSE RESULT: 5.8 % — HIGH (ref 4–5.6)
ALBUMIN SERPL ELPH-MCNC: 4.4 G/DL — SIGNIFICANT CHANGE UP (ref 3.5–5.2)
ALP SERPL-CCNC: 89 U/L — SIGNIFICANT CHANGE UP (ref 30–115)
ALT FLD-CCNC: 11 U/L — SIGNIFICANT CHANGE UP (ref 0–41)
ANION GAP SERPL CALC-SCNC: 17 MMOL/L — HIGH (ref 7–14)
APTT BLD: 33.7 SEC — SIGNIFICANT CHANGE UP (ref 27–39.2)
AST SERPL-CCNC: 18 U/L — SIGNIFICANT CHANGE UP (ref 0–41)
BASOPHILS # BLD AUTO: 0.05 K/UL — SIGNIFICANT CHANGE UP (ref 0–0.2)
BASOPHILS NFR BLD AUTO: 0.5 % — SIGNIFICANT CHANGE UP (ref 0–1)
BILIRUB SERPL-MCNC: 1.3 MG/DL — HIGH (ref 0.2–1.2)
BLD GP AB SCN SERPL QL: SIGNIFICANT CHANGE UP
BUN SERPL-MCNC: 15 MG/DL — SIGNIFICANT CHANGE UP (ref 10–20)
CALCIUM SERPL-MCNC: 9.4 MG/DL — SIGNIFICANT CHANGE UP (ref 8.4–10.5)
CHLORIDE SERPL-SCNC: 100 MMOL/L — SIGNIFICANT CHANGE UP (ref 98–110)
CO2 SERPL-SCNC: 25 MMOL/L — SIGNIFICANT CHANGE UP (ref 17–32)
CREAT SERPL-MCNC: 0.8 MG/DL — SIGNIFICANT CHANGE UP (ref 0.7–1.5)
EGFR: 92 ML/MIN/1.73M2 — SIGNIFICANT CHANGE UP
EOSINOPHIL # BLD AUTO: 0.14 K/UL — SIGNIFICANT CHANGE UP (ref 0–0.7)
EOSINOPHIL NFR BLD AUTO: 1.4 % — SIGNIFICANT CHANGE UP (ref 0–8)
ESTIMATED AVERAGE GLUCOSE: 120 MG/DL — HIGH (ref 68–114)
GLUCOSE SERPL-MCNC: 119 MG/DL — HIGH (ref 70–99)
HCT VFR BLD CALC: 46.9 % — SIGNIFICANT CHANGE UP (ref 42–52)
HGB BLD-MCNC: 16.2 G/DL — SIGNIFICANT CHANGE UP (ref 14–18)
IMM GRANULOCYTES NFR BLD AUTO: 0.7 % — HIGH (ref 0.1–0.3)
INR BLD: 1.5 RATIO — HIGH (ref 0.65–1.3)
LYMPHOCYTES # BLD AUTO: 1.15 K/UL — LOW (ref 1.2–3.4)
LYMPHOCYTES # BLD AUTO: 11.2 % — LOW (ref 20.5–51.1)
MCHC RBC-ENTMCNC: 29.6 PG — SIGNIFICANT CHANGE UP (ref 27–31)
MCHC RBC-ENTMCNC: 34.5 G/DL — SIGNIFICANT CHANGE UP (ref 32–37)
MCV RBC AUTO: 85.7 FL — SIGNIFICANT CHANGE UP (ref 80–94)
MONOCYTES # BLD AUTO: 0.6 K/UL — SIGNIFICANT CHANGE UP (ref 0.1–0.6)
MONOCYTES NFR BLD AUTO: 5.8 % — SIGNIFICANT CHANGE UP (ref 1.7–9.3)
MRSA PCR RESULT.: NEGATIVE — SIGNIFICANT CHANGE UP
NEUTROPHILS # BLD AUTO: 8.29 K/UL — HIGH (ref 1.4–6.5)
NEUTROPHILS NFR BLD AUTO: 80.4 % — HIGH (ref 42.2–75.2)
NRBC # BLD: 0 /100 WBCS — SIGNIFICANT CHANGE UP (ref 0–0)
PLATELET # BLD AUTO: 148 K/UL — SIGNIFICANT CHANGE UP (ref 130–400)
PMV BLD: 10.7 FL — HIGH (ref 7.4–10.4)
POTASSIUM SERPL-MCNC: 3.8 MMOL/L — SIGNIFICANT CHANGE UP (ref 3.5–5)
POTASSIUM SERPL-SCNC: 3.8 MMOL/L — SIGNIFICANT CHANGE UP (ref 3.5–5)
PROT SERPL-MCNC: 7.2 G/DL — SIGNIFICANT CHANGE UP (ref 6–8)
PROTHROM AB SERPL-ACNC: 17.2 SEC — HIGH (ref 9.95–12.87)
RBC # BLD: 5.47 M/UL — SIGNIFICANT CHANGE UP (ref 4.7–6.1)
RBC # FLD: 13.5 % — SIGNIFICANT CHANGE UP (ref 11.5–14.5)
SODIUM SERPL-SCNC: 142 MMOL/L — SIGNIFICANT CHANGE UP (ref 135–146)
WBC # BLD: 10.3 K/UL — SIGNIFICANT CHANGE UP (ref 4.8–10.8)
WBC # FLD AUTO: 10.3 K/UL — SIGNIFICANT CHANGE UP (ref 4.8–10.8)

## 2024-02-13 PROCEDURE — 80053 COMPREHEN METABOLIC PANEL: CPT

## 2024-02-13 PROCEDURE — 99214 OFFICE O/P EST MOD 30 MIN: CPT | Mod: 25

## 2024-02-13 PROCEDURE — 85610 PROTHROMBIN TIME: CPT

## 2024-02-13 PROCEDURE — 86900 BLOOD TYPING SEROLOGIC ABO: CPT

## 2024-02-13 PROCEDURE — 87641 MR-STAPH DNA AMP PROBE: CPT

## 2024-02-13 PROCEDURE — 71046 X-RAY EXAM CHEST 2 VIEWS: CPT

## 2024-02-13 PROCEDURE — 83036 HEMOGLOBIN GLYCOSYLATED A1C: CPT

## 2024-02-13 PROCEDURE — 71046 X-RAY EXAM CHEST 2 VIEWS: CPT | Mod: 26

## 2024-02-13 PROCEDURE — 85025 COMPLETE CBC W/AUTO DIFF WBC: CPT

## 2024-02-13 PROCEDURE — 73502 X-RAY EXAM HIP UNI 2-3 VIEWS: CPT | Mod: RT

## 2024-02-13 PROCEDURE — 93005 ELECTROCARDIOGRAM TRACING: CPT

## 2024-02-13 PROCEDURE — 73502 X-RAY EXAM HIP UNI 2-3 VIEWS: CPT | Mod: 26,RT

## 2024-02-13 PROCEDURE — 86901 BLOOD TYPING SEROLOGIC RH(D): CPT

## 2024-02-13 PROCEDURE — 87640 STAPH A DNA AMP PROBE: CPT

## 2024-02-13 PROCEDURE — 86850 RBC ANTIBODY SCREEN: CPT

## 2024-02-13 PROCEDURE — 36415 COLL VENOUS BLD VENIPUNCTURE: CPT

## 2024-02-13 PROCEDURE — 85730 THROMBOPLASTIN TIME PARTIAL: CPT

## 2024-02-13 PROCEDURE — 93010 ELECTROCARDIOGRAM REPORT: CPT

## 2024-02-13 RX ORDER — CELECOXIB 200 MG/1
200 CAPSULE ORAL ONCE
Refills: 0 | Status: DISCONTINUED | OUTPATIENT
Start: 2024-02-13 | End: 2024-02-13

## 2024-02-13 NOTE — H&P PST ADULT - GASTROINTESTINAL
? [address] [facility] floor       [address], [place]         Phone: [phone]       Fax: [phone] [facility] at [address] [facility] , [address] [facility]  Phone: [phone]  Fax: [phone]     Occupational Therapy Daily Treatment Note    Date:  2019  Patient Name:  Darrell Bell    :  1990  MRN: [id number]  Physician: Aaron Hernandez  Insurance: [facility]  Medical Diagnosis: Closed displaced fracture of proximal phalanx of right little finger with routine healing, subsequent encounter (S62.616)          Rehab Codes: pain in finger M79.646, stiffness in joint M25.64, Edema R60.0 Weakness M62.81  Onset Date: 2018    Next Dr. Torres Manual: TBD  Visit# / total visits: 10 /12  Cancels/No Shows: 0/0      Subjective: Increased soreness after last treatment. After rest for two day's decreased soreness. Pain:  No Location:  N/A Pain Rating: (0-10 scale)   Pain altered Tx:  No  Action:  Comments:    Objective:  Modalities:   Exercises:    EXERCISE    REPS/     TIME  WEIGHT/    LEVEL COMMENTS   AROM   Discontinued   PROM   Discontinued   Pinch pins with foam block 1 Series Green 4 lbs Completed   Wooden blocks with velcro 1 series  Completed. Demo'd  pull tech incorporating use of small digit   theraputty 10 Blue Completed   Rubber band ball 10  Completed with weighted rubber bands ranging from 5-20lb's   Hand exerciser 30 45 pounds Completed    Flexbar   strength  Wrist flex/ext  Wrist pron/sup  Wrist ulnar/rad  Thumb abd/add     10 Green completed     Other: Increased griping to 5lb's with good tolerance. Specific Instructions for next treatment:    Treatment Charges: Mins Units   Therapeutic Exercise [id number]                           Assessment: Progressing Towards Goals      Short Term Goals: (  6    Treatments)  1.  Decrease Pain: from 4/10 with simple adl tasks to 2/10 with simple adl normal/soft/nontender/nondistended/normal active bowel sounds

## 2024-02-13 NOTE — H&P PST ADULT - NSICDXPASTMEDICALHX_GEN_ALL_CORE_FT
PAST MEDICAL HISTORY:  HLD (hyperlipidemia)     HTN (hypertension)     AMY on CPAP     Pancreatic cancer s/p whipple     PAST MEDICAL HISTORY:  HLD (hyperlipidemia)     HTN (hypertension)     Mild CAD     AMY on CPAP     Pancreatic cancer s/p whipple

## 2024-02-13 NOTE — H&P PST ADULT - NSICDXFAMILYHX_GEN_ALL_CORE_FT
FAMILY HISTORY:  FHx: cancer, father: Liver ca , Mother: lung ca, Brother: stomach+esophageal ca

## 2024-02-13 NOTE — H&P PST ADULT - REASON FOR ADMISSION
Case Type: OP  Suite: OR [facility]  Proceduralist: Wali Garner  Confirmed Surgery Date Time: 02-  PAST Date Time: 02- - 7:15  Procedure: RIGHT TOTAL HIP REPLACEMENT  Laterality: Right  Length of Procedure: 120 Minutes  Anesthesia Type: Regional

## 2024-02-13 NOTE — H&P PST ADULT - HISTORY OF PRESENT ILLNESS
73M w/ PMHx of CAD in native artery, HTN, HLD, Pancreatic CA s/p Whipple procedure (2010), SBO, double hernia surgery GERD, and AMY on CPAP, Afib ( 06/3/2022) s/p MARI and cardioversion    (06/6/2022), S/P Left TKR ( 7 yrs ago) who presents to pretesting for right total hip replacement due to sharp right hip pains scale 6/10 with limited ROM and partial reliefs with cortisone injections.   Patient denies any cp, sob, palpitations, fever, cough, URI, abdominal pains, N/V, UTI, Rashes or open wounds.  As per patient exercise tolerance of 1 fos walks with out sob. Patient is limping with Right hip pains   Patient denies any s/s covid 19 and reports no contact with known positive people. Patient instructed to continue to self monitor and report any concerns to MD. Pt will continue to practice self isolation and  exposure control measures pre op.  Anesthesia Alert  NO--Difficult Airway  NO--History of neck surgery or radiation  NO--Limited ROM of neck  NO--History of Malignant hyperthermia  NO--Personal or family history of Pseudocholinesterase deficiency  YES--Prior Anesthesia Complication. Propofol stop breathing   NO--Latex Allergy  NO--Loose teeth  NO--History of Rheumatoid Arthritis  YES--AMY. On CPAP   YES- Risk of Bleedings due to ELIQUIS   Pt instructed to stop vitamins/supplements/herbal medications for one week prior to surgery  As per patient this is the complete medical, surgical history and medications.  Duke Activity Status Index (DASI) from [employer]  on 2/13/2024  ** All calculations should be rechecked by clinician prior to use **  RESULT SUMMARY:  26.95 points  The higher the score (maximum 58.2), the higher the functional status.  6.05 METs  INPUTS:  Take care of self —> 2.75 = Yes  Walk indoors —> 1.75 = Yes  Walk 1&ndash;2 blocks on level ground —> 2.75 = Yes  Climb a flight of stairs or walk up a hill —> 5.5 = Yes  Run a short distance —> 8 = Yes  Do light work around the house —> 2.7 = Yes  Do moderate work around the house —> 3.5 = Yes  Do heavy work around the house —> 0 = No  Do yard work —> 0 = No  Have sexual relations —> 0 = No  Participate in moderate recreational activities —> 0 = No  Participate in strenuous sports —> 0 = No  Revised Cardiac Risk Index for Pre-Operative Risk from [employer]  on 2/13/2024  ** All calculations should be rechecked by clinician prior to use **  RESULT SUMMARY:  1 points  Class II Risk  6.0 %  30-day risk of death, MI, or cardiac arrest  From Ducwiley 2017. These numbers are higher than those from the original study (Nelson 1999). See Evidence for details.  INPUTS:  Elevated-risk surgery —> 1 = Yes  History of ischemic heart disease —> 0 = No  History of congestive heart failure —> 0 = No  History of cerebrovascular disease —> 0 = No  Pre-operative treatment with insulin —> 0 = No  Pre-operative creatinine >2 mg/dL / 176.8 µmol/L —> 0 = No       73M w/ PMHx of Non obstrctive CAD( cath 07/17 less than 50% RCA), HTN, HLD, Pancreatic CA s/p Whipple procedure (2010), SBO, double hernia surgery GERD, and AMY on CPAP, Afib ( 06/3/2022) s/p MARI and cardioversion  (06/6/2022), S/P Left TKR ( 7 yrs ago) who presents to pretesting for right total hip replacement due to sharp right hip pains scale 6/10 with limited ROM and partial reliefs with cortisone injections.   Patient denies any cp, sob, palpitations, fever, cough, URI, abdominal pains, N/V, UTI, Rashes or open wounds.  As per patient exercise tolerance of 1 fos walks with out sob. Patient is limping with Right hip pains   Patient denies any s/s covid 19 and reports no contact with known positive people. Patient instructed to continue to self monitor and report any concerns to MD. Pt will continue to practice self isolation and  exposure control measures pre op.  Anesthesia Alert  NO--Difficult Airway  NO--History of neck surgery or radiation  NO--Limited ROM of neck  NO--History of Malignant hyperthermia  NO--Personal or family history of Pseudocholinesterase deficiency  YES--Prior Anesthesia Complication. Propofol stop breathing   NO--Latex Allergy  NO--Loose teeth  NO--History of Rheumatoid Arthritis  YES--AMY. On CPAP   YES- Risk of Bleedings due to ELIQUIS   Pt instructed to stop vitamins/supplements/herbal medications for one week prior to surgery  As per patient this is the complete medical, surgical history and medications.  Duke Activity Status Index (DASI) from [employer]  on 2/13/2024  ** All calculations should be rechecked by clinician prior to use **  RESULT SUMMARY:  26.95 points  The higher the score (maximum 58.2), the higher the functional status.  6.05 METs  INPUTS:  Take care of self —> 2.75 = Yes  Walk indoors —> 1.75 = Yes  Walk 1&ndash;2 blocks on level ground —> 2.75 = Yes  Climb a flight of stairs or walk up a hill —> 5.5 = Yes  Run a short distance —> 8 = Yes  Do light work around the house —> 2.7 = Yes  Do moderate work around the house —> 3.5 = Yes  Do heavy work around the house —> 0 = No  Do yard work —> 0 = No  Have sexual relations —> 0 = No  Participate in moderate recreational activities —> 0 = No  Participate in strenuous sports —> 0 = No  Revised Cardiac Risk Index for Pre-Operative Risk from [employer]  on 2/13/2024  ** All calculations should be rechecked by clinician prior to use **  RESULT SUMMARY:  1 points  Class II Risk  6.0 %  30-day risk of death, MI, or cardiac arrest  From Ducwiley 2017. These numbers are higher than those from the original study (Nelson 1999). See Evidence for details.  INPUTS:  Elevated-risk surgery —> 1 = Yes  History of ischemic heart disease —> 0 = No  History of congestive heart failure —> 0 = No  History of cerebrovascular disease —> 0 = No  Pre-operative treatment with insulin —> 0 = No  Pre-operative creatinine >2 mg/dL / 176.8 µmol/L —> 0 = No       73M w/ PMHx of Non obstrctive CAD( cath 07/17 less than 50% RCA), HTN, HLD, Pancreatic CA s/p Whipple procedure (2010), SBO, double hernia surgery GERD, and AMY on CPAP, Afib ( 06/3/2022) s/p MARI and cardioversion  (06/6/2022), S/P Left TKR ( 7 yrs ago) who presents to pretesting for right total hip replacement due to sharp right hip pains scale 6/10 with limited ROM and partial reliefs with cortisone injections.   Patient denies any cp, sob, palpitations, fever, cough, URI, abdominal pains, N/V, UTI, Rashes or open wounds.  As per patient exercise tolerance of 1 fos walks with out sob. Patient is limping with Right hip pains   Patient denies any s/s covid 19 and reports no contact with known positive people. Patient instructed to continue to self monitor and report any concerns to MD. Pt will continue to practice self isolation and  exposure control measures pre op.  Anesthesia Alert  NO--Difficult Airway  NO--History of neck surgery or radiation  NO--Limited ROM of neck  NO--History of Malignant hyperthermia  NO--Personal or family history of Pseudocholinesterase deficiency  YES--Prior Anesthesia Complication. Propofol stop breathing   NO--Latex Allergy  NO--Loose teeth  NO--History of Rheumatoid Arthritis  YES--AMY. On CPAP   YES- Risk of Bleedings due to ELIQUIS   Tylenol not ordered due to elevated Bilirubin   Pt instructed to stop vitamins/supplements/herbal medications for one week prior to surgery  As per patient this is the complete medical, surgical history and medications.  Duke Activity Status Index (DASI) from [email or web address]  on 2/13/2024  ** All calculations should be rechecked by clinician prior to use **  RESULT SUMMARY:  26.95 points  The higher the score (maximum 58.2), the higher the functional status.  6.05 METs  INPUTS:  Take care of self —> 2.75 = Yes  Walk indoors —> 1.75 = Yes  Walk 1&ndash;2 blocks on level ground —> 2.75 = Yes  Climb a flight of stairs or walk up a hill —> 5.5 = Yes  Run a short distance —> 8 = Yes  Do light work around the house —> 2.7 = Yes  Do moderate work around the house —> 3.5 = Yes  Do heavy work around the house —> 0 = No  Do yard work —> 0 = No  Have sexual relations —> 0 = No  Participate in moderate recreational activities —> 0 = No  Participate in strenuous sports —> 0 = No  Revised Cardiac Risk Index for Pre-Operative Risk from [employer]  on 2/13/2024  ** All calculations should be rechecked by clinician prior to use **  RESULT SUMMARY:  1 points  Class II Risk  6.0 %  30-day risk of death, MI, or cardiac arrest  From [employer] 2017. These numbers are higher than those from the original study (Nelson 1999). See Evidence for details.  INPUTS:  Elevated-risk surgery —> 1 = Yes  History of ischemic heart disease —> 0 = No  History of congestive heart failure —> 0 = No  History of cerebrovascular disease —> 0 = No  Pre-operative treatment with insulin —> 0 = No  Pre-operative creatinine >2 mg/dL / 176.8 µmol/L —> 0 = No

## 2024-02-13 NOTE — H&P PST ADULT - NSICDXPASTSURGICALHX_GEN_ALL_CORE_FT
PAST SURGICAL HISTORY:  S/P hernia repair      PAST SURGICAL HISTORY:  History of cardioversion     History of Whipple procedure     S/P hernia repair

## 2024-02-13 NOTE — H&P PST ADULT - MUSCULOSKELETAL
Right Hip/decreased ROM due to pain/strength 5/5 bilateral upper extremities Multiparity    Obesity  BMI  39.1  UTI (lower urinary tract infection) details…

## 2024-02-14 DIAGNOSIS — M16.11 UNILATERAL PRIMARY OSTEOARTHRITIS, RIGHT HIP: ICD-10-CM

## 2024-02-14 DIAGNOSIS — Z01.818 ENCOUNTER FOR OTHER PREPROCEDURAL EXAMINATION: ICD-10-CM

## 2024-02-15 NOTE — PLAN
[TextEntry] : patient was counseled to use the machine every night at least 4 hrs  also counseled for weight loss and exercise from pulmonary he has mild risk for surgery recommend to use the cpap machine and monitor end tidal co2 perioperatively, caution with sedation adn pain medication poonam post op encourage use of  incentive spirometer and dvt prophylaxis

## 2024-02-15 NOTE — HISTORY OF PRESENT ILLNESS
[TextBox_4] : Patient coming for follow-up been using machine every night getting benefit compliance report reviewed his average AHI excellent and  excellent compliance denies any cough wheezing or shortness of breath been trying to lose weight

## 2024-02-27 ENCOUNTER — INPATIENT (INPATIENT)
Facility: HOSPITAL | Age: 74
LOS: 0 days | Discharge: HOME HEALTH | DRG: 470 | End: 2024-02-28
Attending: ORTHOPAEDIC SURGERY | Admitting: ORTHOPAEDIC SURGERY
Payer: MEDICARE

## 2024-02-27 ENCOUNTER — RESULT REVIEW (OUTPATIENT)
Age: 74
End: 2024-02-27

## 2024-02-27 VITALS
OXYGEN SATURATION: 97 % | HEART RATE: 69 BPM | HEIGHT: 70 IN | RESPIRATION RATE: 17 BRPM | WEIGHT: 229.94 LBS | TEMPERATURE: 97 F | DIASTOLIC BLOOD PRESSURE: 78 MMHG | SYSTOLIC BLOOD PRESSURE: 138 MMHG

## 2024-02-27 DIAGNOSIS — Z90.410 ACQUIRED TOTAL ABSENCE OF PANCREAS: Chronic | ICD-10-CM

## 2024-02-27 DIAGNOSIS — Z98.890 OTHER SPECIFIED POSTPROCEDURAL STATES: Chronic | ICD-10-CM

## 2024-02-27 DIAGNOSIS — Z92.89 PERSONAL HISTORY OF OTHER MEDICAL TREATMENT: Chronic | ICD-10-CM

## 2024-02-27 DIAGNOSIS — M16.11 UNILATERAL PRIMARY OSTEOARTHRITIS, RIGHT HIP: ICD-10-CM

## 2024-02-27 LAB — GLUCOSE BLDC GLUCOMTR-MCNC: 127 MG/DL — HIGH (ref 70–99)

## 2024-02-27 PROCEDURE — 88311 DECALCIFY TISSUE: CPT | Mod: 26

## 2024-02-27 PROCEDURE — 80048 BASIC METABOLIC PNL TOTAL CA: CPT

## 2024-02-27 PROCEDURE — 73501 X-RAY EXAM HIP UNI 1 VIEW: CPT | Mod: RT

## 2024-02-27 PROCEDURE — 88305 TISSUE EXAM BY PATHOLOGIST: CPT

## 2024-02-27 PROCEDURE — 97165 OT EVAL LOW COMPLEX 30 MIN: CPT | Mod: GO

## 2024-02-27 PROCEDURE — C1776: CPT

## 2024-02-27 PROCEDURE — 85027 COMPLETE CBC AUTOMATED: CPT

## 2024-02-27 PROCEDURE — 88311 DECALCIFY TISSUE: CPT

## 2024-02-27 PROCEDURE — 88305 TISSUE EXAM BY PATHOLOGIST: CPT | Mod: 26

## 2024-02-27 PROCEDURE — 97116 GAIT TRAINING THERAPY: CPT | Mod: GP

## 2024-02-27 PROCEDURE — 97162 PT EVAL MOD COMPLEX 30 MIN: CPT | Mod: GP

## 2024-02-27 PROCEDURE — 97110 THERAPEUTIC EXERCISES: CPT | Mod: GP

## 2024-02-27 PROCEDURE — 36415 COLL VENOUS BLD VENIPUNCTURE: CPT

## 2024-02-27 PROCEDURE — 82962 GLUCOSE BLOOD TEST: CPT

## 2024-02-27 RX ORDER — KETOROLAC TROMETHAMINE 30 MG/ML
15 SYRINGE (ML) INJECTION EVERY 6 HOURS
Refills: 0 | Status: DISCONTINUED | OUTPATIENT
Start: 2024-02-27 | End: 2024-02-28

## 2024-02-27 RX ORDER — AMLODIPINE BESYLATE 2.5 MG/1
1 TABLET ORAL
Qty: 0 | Refills: 0 | DISCHARGE

## 2024-02-27 RX ORDER — LANSOPRAZOLE 15 MG/1
1 CAPSULE, DELAYED RELEASE ORAL
Refills: 0 | DISCHARGE

## 2024-02-27 RX ORDER — METOPROLOL TARTRATE 50 MG
25 TABLET ORAL
Refills: 0 | Status: DISCONTINUED | OUTPATIENT
Start: 2024-02-27 | End: 2024-02-28

## 2024-02-27 RX ORDER — SENNA PLUS 8.6 MG/1
2 TABLET ORAL AT BEDTIME
Refills: 0 | Status: DISCONTINUED | OUTPATIENT
Start: 2024-02-27 | End: 2024-02-28

## 2024-02-27 RX ORDER — AMLODIPINE BESYLATE 2.5 MG/1
10 TABLET ORAL DAILY
Refills: 0 | Status: DISCONTINUED | OUTPATIENT
Start: 2024-02-27 | End: 2024-02-28

## 2024-02-27 RX ORDER — DOCUSATE SODIUM 100 MG
1 CAPSULE ORAL
Refills: 0 | DISCHARGE

## 2024-02-27 RX ORDER — CELECOXIB 200 MG/1
200 CAPSULE ORAL EVERY 12 HOURS
Refills: 0 | Status: DISCONTINUED | OUTPATIENT
Start: 2024-02-28 | End: 2024-02-28

## 2024-02-27 RX ORDER — ONDANSETRON 8 MG/1
4 TABLET, FILM COATED ORAL ONCE
Refills: 0 | Status: DISCONTINUED | OUTPATIENT
Start: 2024-02-27 | End: 2024-02-27

## 2024-02-27 RX ORDER — TRAMADOL HYDROCHLORIDE 50 MG/1
50 TABLET ORAL EVERY 4 HOURS
Refills: 0 | Status: DISCONTINUED | OUTPATIENT
Start: 2024-02-27 | End: 2024-02-28

## 2024-02-27 RX ORDER — POLYETHYLENE GLYCOL 3350 17 G/17G
17 POWDER, FOR SOLUTION ORAL AT BEDTIME
Refills: 0 | Status: DISCONTINUED | OUTPATIENT
Start: 2024-02-27 | End: 2024-02-28

## 2024-02-27 RX ORDER — CELECOXIB 200 MG/1
200 CAPSULE ORAL ONCE
Refills: 0 | Status: COMPLETED | OUTPATIENT
Start: 2024-02-27 | End: 2024-02-27

## 2024-02-27 RX ORDER — MULTIVIT-MIN/FERROUS GLUCONATE 9 MG/15 ML
1 LIQUID (ML) ORAL
Refills: 0 | DISCHARGE

## 2024-02-27 RX ORDER — APIXABAN 2.5 MG/1
5 TABLET, FILM COATED ORAL EVERY 12 HOURS
Refills: 0 | Status: DISCONTINUED | OUTPATIENT
Start: 2024-02-28 | End: 2024-02-28

## 2024-02-27 RX ORDER — FERROUS SULFATE 325(65) MG
1 TABLET ORAL
Refills: 0 | DISCHARGE

## 2024-02-27 RX ORDER — ACETAMINOPHEN 500 MG
650 TABLET ORAL EVERY 6 HOURS
Refills: 0 | Status: DISCONTINUED | OUTPATIENT
Start: 2024-02-27 | End: 2024-02-28

## 2024-02-27 RX ORDER — CHLORHEXIDINE GLUCONATE 213 G/1000ML
1 SOLUTION TOPICAL DAILY
Refills: 0 | Status: DISCONTINUED | OUTPATIENT
Start: 2024-02-27 | End: 2024-02-28

## 2024-02-27 RX ORDER — DEXAMETHASONE 0.5 MG/5ML
2 ELIXIR ORAL ONCE
Refills: 0 | Status: COMPLETED | OUTPATIENT
Start: 2024-02-28 | End: 2024-02-28

## 2024-02-27 RX ORDER — SODIUM CHLORIDE 9 MG/ML
1000 INJECTION INTRAMUSCULAR; INTRAVENOUS; SUBCUTANEOUS
Refills: 0 | Status: DISCONTINUED | OUTPATIENT
Start: 2024-02-27 | End: 2024-02-28

## 2024-02-27 RX ORDER — SODIUM CHLORIDE 9 MG/ML
1000 INJECTION, SOLUTION INTRAVENOUS
Refills: 0 | Status: DISCONTINUED | OUTPATIENT
Start: 2024-02-27 | End: 2024-02-27

## 2024-02-27 RX ORDER — HYDROMORPHONE HYDROCHLORIDE 2 MG/ML
0.5 INJECTION INTRAMUSCULAR; INTRAVENOUS; SUBCUTANEOUS
Refills: 0 | Status: DISCONTINUED | OUTPATIENT
Start: 2024-02-27 | End: 2024-02-27

## 2024-02-27 RX ORDER — ONDANSETRON 8 MG/1
4 TABLET, FILM COATED ORAL EVERY 6 HOURS
Refills: 0 | Status: DISCONTINUED | OUTPATIENT
Start: 2024-02-27 | End: 2024-02-28

## 2024-02-27 RX ORDER — MAGNESIUM HYDROXIDE 400 MG/1
30 TABLET, CHEWABLE ORAL DAILY
Refills: 0 | Status: DISCONTINUED | OUTPATIENT
Start: 2024-02-27 | End: 2024-02-28

## 2024-02-27 RX ORDER — SENNA PLUS 8.6 MG/1
1 TABLET ORAL
Refills: 0 | DISCHARGE

## 2024-02-27 RX ORDER — PANTOPRAZOLE SODIUM 20 MG/1
40 TABLET, DELAYED RELEASE ORAL
Refills: 0 | Status: DISCONTINUED | OUTPATIENT
Start: 2024-02-27 | End: 2024-02-28

## 2024-02-27 RX ORDER — LIPASE/PROTEASE/AMYLASE 16-48-48K
1 CAPSULE,DELAYED RELEASE (ENTERIC COATED) ORAL
Refills: 0 | DISCHARGE

## 2024-02-27 RX ORDER — CEFAZOLIN SODIUM 1 G
2000 VIAL (EA) INJECTION EVERY 8 HOURS
Refills: 0 | Status: COMPLETED | OUTPATIENT
Start: 2024-02-27 | End: 2024-02-28

## 2024-02-27 RX ORDER — VALSARTAN 80 MG/1
320 TABLET ORAL DAILY
Refills: 0 | Status: DISCONTINUED | OUTPATIENT
Start: 2024-02-27 | End: 2024-02-28

## 2024-02-27 RX ORDER — ATORVASTATIN CALCIUM 80 MG/1
1 TABLET, FILM COATED ORAL
Qty: 0 | Refills: 0 | DISCHARGE

## 2024-02-27 RX ORDER — ATORVASTATIN CALCIUM 80 MG/1
10 TABLET, FILM COATED ORAL AT BEDTIME
Refills: 0 | Status: DISCONTINUED | OUTPATIENT
Start: 2024-02-27 | End: 2024-02-28

## 2024-02-27 RX ADMIN — Medication 25 MILLIGRAM(S): at 17:57

## 2024-02-27 RX ADMIN — Medication 650 MILLIGRAM(S): at 17:57

## 2024-02-27 RX ADMIN — SODIUM CHLORIDE 75 MILLILITER(S): 9 INJECTION, SOLUTION INTRAVENOUS at 12:23

## 2024-02-27 RX ADMIN — CELECOXIB 200 MILLIGRAM(S): 200 CAPSULE ORAL at 07:27

## 2024-02-27 RX ADMIN — ATORVASTATIN CALCIUM 10 MILLIGRAM(S): 80 TABLET, FILM COATED ORAL at 21:39

## 2024-02-27 RX ADMIN — Medication 15 MILLIGRAM(S): at 17:57

## 2024-02-27 RX ADMIN — Medication 15 MILLIGRAM(S): at 18:27

## 2024-02-27 RX ADMIN — SODIUM CHLORIDE 75 MILLILITER(S): 9 INJECTION INTRAMUSCULAR; INTRAVENOUS; SUBCUTANEOUS at 15:26

## 2024-02-27 RX ADMIN — CELECOXIB 200 MILLIGRAM(S): 200 CAPSULE ORAL at 06:26

## 2024-02-27 RX ADMIN — Medication 100 MILLIGRAM(S): at 17:56

## 2024-02-27 RX ADMIN — SENNA PLUS 2 TABLET(S): 8.6 TABLET ORAL at 21:39

## 2024-02-27 RX ADMIN — Medication 650 MILLIGRAM(S): at 18:27

## 2024-02-27 NOTE — ASU PREOP CHECKLIST - PATIENT SENT TO
holding area report to mateusz jackson rn/holding area report to mateusz jackson rn/operating room/holding area

## 2024-02-27 NOTE — PHYSICAL THERAPY INITIAL EVALUATION ADULT - MANUAL MUSCLE TESTING RESULTS, REHAB EVAL
LLE ms strength grossly graded 3+ to 4-/5; (R) hip 3-/5; (R) knee/ankle 3 tto 3+/5; Please refer to OT claire for BUE

## 2024-02-27 NOTE — ASU PATIENT PROFILE, ADULT - NSICDXPASTMEDICALHX_GEN_ALL_CORE_FT
PAST MEDICAL HISTORY:  Atrial fibrillation     HLD (hyperlipidemia)     HTN (hypertension)     Mild CAD     Obesity with body mass index (BMI) of 30.0 to 39.9     AMY on CPAP     Pancreatic cancer s/p whipple

## 2024-02-27 NOTE — PHYSICAL THERAPY INITIAL EVALUATION ADULT - ADDITIONAL COMMENTS
Per patient, they live in private home with 8-10 steps outside with (L) rail going up, 10-13 steps inside with  (L) rail going up to 2nd floor; has cane if needed

## 2024-02-27 NOTE — ASU PATIENT PROFILE, ADULT - NSICDXPASTSURGICALHX_GEN_ALL_CORE_FT
PAST SURGICAL HISTORY:  History of cardioversion     History of Whipple procedure     S/P hernia repair

## 2024-02-27 NOTE — PATIENT PROFILE ADULT - FALL HARM RISK - HARM RISK INTERVENTIONS
Assistance with ambulation/Assistance OOB with selected safe patient handling equipment/Communicate Risk of Fall with Harm to all staff/Discuss with provider need for PT consult/Monitor gait and stability/Provide patient with walking aids - walker, cane, crutches/Reinforce activity limits and safety measures with patient and family/Sit up slowly, dangle for a short time, stand at bedside before walking/Tailored Fall Risk Interventions/Use of alarms - bed, chair and/or voice tab/Visual Cue: Yellow wristband and red socks/Bed in lowest position, wheels locked, appropriate side rails in place/Call bell, personal items and telephone in reach/Instruct patient to call for assistance before getting out of bed or chair/Non-slip footwear when patient is out of bed/[place] to call system/Physically safe environment - no spills, clutter or unnecessary equipment/Purposeful Proactive Rounding/Room/bathroom lighting operational, light cord in reach

## 2024-02-27 NOTE — PHYSICAL THERAPY INITIAL EVALUATION ADULT - GENERAL OBSERVATIONS, REHAB EVAL
13:45-14:15 Chart reviewed. Pt encountered semireclined in bed, may be seen by Physical Therapist as confirmed with Nurse. Patient denied pain and ready to get up now; +Prevena dressing (R) hip/compression socks/ heplock RUE

## 2024-02-27 NOTE — PATIENT PROFILE ADULT - VISION (WITH CORRECTIVE LENSES IF THE PATIENT USUALLY WEARS THEM):
Normal vision: sees adequately in most situations; can see medication labels, newsprint
Plan excision after back inflamed cyst excised
Detail Level: Zone

## 2024-02-27 NOTE — PROGRESS NOTE ADULT - SUBJECTIVE AND OBJECTIVE BOX
RIGHT FELIBERTO ORTHOPEDIC  POST OP CHECK     T(C): 35.6 (02-27-24 @ 16:30), Max: 36.7 (02-27-24 @ 11:48)  HR: 73 (02-27-24 @ 16:30) (67 - 77)  BP: 111/60 (02-27-24 @ 16:30) (102/55 - 138/78)  RR: 18 (02-27-24 @ 16:30) (17 - 20)  SpO2: 96% (02-27-24 @ 16:30) (96% - 98%)      S/P Total Hip arthroplasty POD#0  -Pain Controlled  -Vital signs stable  -AxOx3  -Dressing Aquacel i place c/d/i  -NVI  -post op abx x2 doses  -encourage incentive spirometry  -sequentials  -chem DVT ordered- full dose eliquis to restart tomorrow morning   -PT/OT Post op   -D/c Planning for tomorrow

## 2024-02-27 NOTE — ASU PATIENT PROFILE, ADULT - FALL HARM RISK - HARM RISK INTERVENTIONS

## 2024-02-27 NOTE — PHYSICAL THERAPY INITIAL EVALUATION ADULT - GAIT DEVIATIONS NOTED, PT EVAL
stooped posture, dec heel strike/pushoff /stance on RLE/decreased davey/decreased step length/decreased weight-shifting ability

## 2024-02-27 NOTE — PHYSICAL THERAPY INITIAL EVALUATION ADULT - ACTIVE RANGE OF MOTION EXAMINATION, REHAB EVAL
RLE required AAROM/AROM with (R) hip flexion 0-80 degrees and (R) hip abduction 0-25 degrees, in supine; Please refer to OT eval for BUE/Left LE Active ROM was WFL (within functional limits)

## 2024-02-28 ENCOUNTER — TRANSCRIPTION ENCOUNTER (OUTPATIENT)
Age: 74
End: 2024-02-28

## 2024-02-28 VITALS
RESPIRATION RATE: 18 BRPM | HEART RATE: 76 BPM | SYSTOLIC BLOOD PRESSURE: 110 MMHG | DIASTOLIC BLOOD PRESSURE: 60 MMHG | TEMPERATURE: 98 F | OXYGEN SATURATION: 95 %

## 2024-02-28 LAB
ANION GAP SERPL CALC-SCNC: 10 MMOL/L — SIGNIFICANT CHANGE UP (ref 7–14)
BUN SERPL-MCNC: 21 MG/DL — HIGH (ref 10–20)
CALCIUM SERPL-MCNC: 8.4 MG/DL — SIGNIFICANT CHANGE UP (ref 8.4–10.5)
CHLORIDE SERPL-SCNC: 101 MMOL/L — SIGNIFICANT CHANGE UP (ref 98–110)
CO2 SERPL-SCNC: 26 MMOL/L — SIGNIFICANT CHANGE UP (ref 17–32)
CREAT SERPL-MCNC: 1.1 MG/DL — SIGNIFICANT CHANGE UP (ref 0.7–1.5)
EGFR: 70 ML/MIN/1.73M2 — SIGNIFICANT CHANGE UP
GLUCOSE SERPL-MCNC: 132 MG/DL — HIGH (ref 70–99)
HCT VFR BLD CALC: 33.1 % — LOW (ref 42–52)
HGB BLD-MCNC: 11.5 G/DL — LOW (ref 14–18)
MCHC RBC-ENTMCNC: 29.3 PG — SIGNIFICANT CHANGE UP (ref 27–31)
MCHC RBC-ENTMCNC: 34.7 G/DL — SIGNIFICANT CHANGE UP (ref 32–37)
MCV RBC AUTO: 84.4 FL — SIGNIFICANT CHANGE UP (ref 80–94)
NRBC # BLD: 0 /100 WBCS — SIGNIFICANT CHANGE UP (ref 0–0)
PLATELET # BLD AUTO: 131 K/UL — SIGNIFICANT CHANGE UP (ref 130–400)
PMV BLD: 11.1 FL — HIGH (ref 7.4–10.4)
POTASSIUM SERPL-MCNC: 3.9 MMOL/L — SIGNIFICANT CHANGE UP (ref 3.5–5)
POTASSIUM SERPL-SCNC: 3.9 MMOL/L — SIGNIFICANT CHANGE UP (ref 3.5–5)
RBC # BLD: 3.92 M/UL — LOW (ref 4.7–6.1)
RBC # FLD: 13.6 % — SIGNIFICANT CHANGE UP (ref 11.5–14.5)
SODIUM SERPL-SCNC: 137 MMOL/L — SIGNIFICANT CHANGE UP (ref 135–146)
WBC # BLD: 15.77 K/UL — HIGH (ref 4.8–10.8)
WBC # FLD AUTO: 15.77 K/UL — HIGH (ref 4.8–10.8)

## 2024-02-28 PROCEDURE — 99222 1ST HOSP IP/OBS MODERATE 55: CPT

## 2024-02-28 RX ORDER — TRAMADOL HYDROCHLORIDE 50 MG/1
1 TABLET ORAL
Qty: 35 | Refills: 0
Start: 2024-02-28

## 2024-02-28 RX ORDER — ACETAMINOPHEN 500 MG
2 TABLET ORAL
Qty: 0 | Refills: 0 | DISCHARGE
Start: 2024-02-28 | End: 2024-03-12

## 2024-02-28 RX ORDER — NALOXONE HYDROCHLORIDE 4 MG/.1ML
4 SPRAY NASAL
Qty: 1 | Refills: 0
Start: 2024-02-28

## 2024-02-28 RX ADMIN — Medication 650 MILLIGRAM(S): at 06:51

## 2024-02-28 RX ADMIN — Medication 15 MILLIGRAM(S): at 00:37

## 2024-02-28 RX ADMIN — Medication 100 MILLIGRAM(S): at 00:37

## 2024-02-28 RX ADMIN — APIXABAN 5 MILLIGRAM(S): 2.5 TABLET, FILM COATED ORAL at 05:42

## 2024-02-28 RX ADMIN — Medication 650 MILLIGRAM(S): at 05:43

## 2024-02-28 RX ADMIN — Medication 15 MILLIGRAM(S): at 00:46

## 2024-02-28 RX ADMIN — PANTOPRAZOLE SODIUM 40 MILLIGRAM(S): 20 TABLET, DELAYED RELEASE ORAL at 05:43

## 2024-02-28 RX ADMIN — Medication 650 MILLIGRAM(S): at 00:46

## 2024-02-28 RX ADMIN — Medication 2 MILLIGRAM(S): at 05:42

## 2024-02-28 RX ADMIN — Medication 15 MILLIGRAM(S): at 05:43

## 2024-02-28 RX ADMIN — Medication 15 MILLIGRAM(S): at 06:51

## 2024-02-28 RX ADMIN — Medication 650 MILLIGRAM(S): at 00:37

## 2024-02-28 NOTE — DISCHARGE NOTE PROVIDER - NSDCCPCAREPLAN_GEN_ALL_CORE_FT
PRINCIPAL DISCHARGE DIAGNOSIS  Diagnosis: Arthritis of right hip  Assessment and Plan of Treatment: . Call your surgeon if any wound drainage, redness , increasing pain, fevers over 101 or if you have any questions or concerns.  Ice pack to affected area q4-6h as needed   You may shower with the Prevena dressing on, do not get the pump wet. Once it is removed  , do not scrub surgical site. Do not apply any lotions/moisturizers/creams to surgical site.  Follow up with Dr. Garner in one week.

## 2024-02-28 NOTE — PROGRESS NOTE ADULT - SUBJECTIVE AND OBJECTIVE BOX
73y Male POD #  1    S/P right Total Hip Arthroplasty     Patient seen and examined at bedside . The patient is awake and alert in NAD. No complaints of chest pain, SOB, N/V.  Pain is controlled, the patient has ambulated and is voiding.     PAST MEDICAL & SURGICAL HISTORY:  HTN (hypertension)    HLD (hyperlipidemia)    Pancreatic cancer  s/p whipple    AMY on CPAP    Mild CAD    Obesity with body mass index (BMI) of 30.0 to 39.9    Atrial fibrillation    S/P hernia repair    History of Whipple procedure    History of cardioversion          MEDICATIONS  (STANDING):  acetaminophen     Tablet .. 650 milliGRAM(s) Oral every 6 hours  amLODIPine   Tablet 10 milliGRAM(s) Oral daily  apixaban 5 milliGRAM(s) Oral every 12 hours  atorvastatin 10 milliGRAM(s) Oral at bedtime  celecoxib 200 milliGRAM(s) Oral every 12 hours  chlorhexidine 2% Cloths 1 Application(s) Topical daily  hydrochlorothiazide 25 milliGRAM(s) Oral daily  metoprolol tartrate 25 milliGRAM(s) Oral two times a day  pantoprazole    Tablet 40 milliGRAM(s) Oral before breakfast  polyethylene glycol 3350 17 Gram(s) Oral at bedtime  senna 2 Tablet(s) Oral at bedtime  valsartan 320 milliGRAM(s) Oral daily    MEDICATIONS  (PRN):  magnesium hydroxide Suspension 30 milliLiter(s) Oral daily PRN Constipation  ondansetron Injectable 4 milliGRAM(s) IV Push every 6 hours PRN Nausea and/or Vomiting  traMADol 50 milliGRAM(s) Oral every 4 hours PRN Mild Pain (1 - 3)        Vital Signs Last 24 Hrs  T(C): 36.7 (28 Feb 2024 08:00), Max: 36.7 (27 Feb 2024 11:48)  T(F): 98.1 (28 Feb 2024 08:00), Max: 98.1 (28 Feb 2024 08:00)  HR: 76 (28 Feb 2024 08:00) (65 - 77)  BP: 110/60 (28 Feb 2024 08:00) (97/56 - 132/69)  BP(mean): --  RR: 18 (28 Feb 2024 08:00) (18 - 20)  SpO2: 95% (28 Feb 2024 08:00) (95% - 98%)                          11.5   15.77 )-----------( 131      ( 28 Feb 2024 06:26 )             33.1     02-28    137  |  101  |  21<H>  ----------------------------<  132<H>  3.9   |  26  |  1.1    Ca    8.4      28 Feb 2024 06:26        Urinalysis Basic - ( 28 Feb 2024 06:26 )    Color: x / Appearance: x / SG: x / pH: x  Gluc: 132 mg/dL / Ketone: x  / Bili: x / Urobili: x   Blood: x / Protein: x / Nitrite: x   Leuk Esterase: x / RBC: x / WBC x   Sq Epi: x / Non Sq Epi: x / Bacteria: x            PE:  The patient was seen and examined at bedside          A&OX3, NAD          Right hip Prevena in place, functioning          Compartments soft, BLE SCD in place          NVI, SILT           A/P:     # POD #  1     s/p right Total Hip Arthroplasty                 - OOB to Chair   -PT/OT - wbat  -post op abx x 2 doses completed  -Pain control - per pain protocol   -Incentive Spirometry   -DVT Prophylaxis - Eliquis  -GI ppx- continue Protonix  -discharge planning- home with home care

## 2024-02-28 NOTE — OCCUPATIONAL THERAPY INITIAL EVALUATION ADULT - LIVES WITH, PROFILE
son in a private house 9-10 Steps to enter (L) HR; 10-13 steps to bedroom (L) Rail; (+) walk in shower/children

## 2024-02-28 NOTE — DISCHARGE NOTE PROVIDER - NSDCMRMEDTOKEN_GEN_ALL_CORE_FT
acetaminophen 325 mg oral tablet: 2 tab(s) orally every 6 hours  amLODIPine 10 mg oral tablet: 1 tab(s) orally once a day  atorvastatin 10 mg oral tablet: 1 tab(s) orally once a day  Centrum Men&#x27;s oral tablet: 1 tab(s) orally once a day  docusate sodium 100 mg oral capsule: 1 cap(s) orally once a day  Eliquis 5 mg oral tablet: 1 tab(s) orally every 12 hours  ferrous sulfate 324 mg (65 mg elemental iron) oral delayed release tablet: 1 tab(s) orally once a day  metoprolol tartrate 25 mg oral tablet: 1 tab(s) orally 2 times a day  naloxone 4 mg/0.1 mL nasal spray: 4 milligram(s) intranasally once as needed for OD take as directed in the event of accidental overdose  Prevacid 15 mg oral delayed release capsule: 1 cap(s) orally once a day  senna (sennosides) 8.6 mg oral tablet: 1 tab(s) orally once a day (at bedtime)  traMADol 50 mg oral tablet: 1 tab(s) orally every 4 hours as needed for Mild Pain (1 - 3) s/p Total Hip Arthroplasty MDD: 5  valsartan-hydrochlorothiazide 320mg-25mg oral tablet: 1 tab(s) orally once a day  Zenpep 15,000 units-47,000 units-63,000 units oral delayed release capsule: 1 orally EVERY OTHER DAY

## 2024-02-28 NOTE — DISCHARGE NOTE PROVIDER - HOSPITAL COURSE
73 year old male who was admitted for an elective Total Hip Arthroplasty. The patient tolerated surgery well with no intra/post operative complications. The patient received intra/post operative antibiotics for infection prophylaxis and will continue on Eliquis BID for his afib which will also lower the risk of blood clots. The patient worked with Physical Therapy while admitted to the hospital and is stable for discharge.

## 2024-02-28 NOTE — DISCHARGE NOTE NURSING/CASE MANAGEMENT/SOCIAL WORK - PATIENT PORTAL LINK FT
You can access the FollowMyHealth Patient Portal offered by [facility] by registering at the following website: [email or web address]. By joining [facility]’s FollowMyHealth portal, you will also be able to view your health information using other applications (apps) compatible with our system.

## 2024-02-28 NOTE — CONSULT NOTE ADULT - ASSESSMENT
Pre-Op Diagnosis	PRE-OP DIAGNOSIS:  Osteoarthritis of right hip 27-Feb-2024 12:25:54  Jaki Vargas  Post-Op Diagnosis	POST-OP DIAGNOSIS:  Status post right hip replacement 27-Feb-2024 12:26:05  Jaki Vargas  Procedure	PROCEDURES:  Hip replacement, total, right 27-Feb-2024 12:25:44  Jaki Vargas  Operative Findings	right hip OA  Evidence of infection or abscess identified at the start or during the surgical procedure:	No  Specimens	femoral head  Estimated Blood Loss	200 milliLiter(s)  POD#1  pain control   DVT prophyaxsis - eliquis per ortho  PT/OT  IS  Bowel Regimen  Ortho follow up       # HTn - bp controlled   # hx of panc ca sp whipple  #amarjit on cpap  # chronic afib - rate controlled, on eliquis , dc nsaids    recall prn

## 2024-02-28 NOTE — OCCUPATIONAL THERAPY INITIAL EVALUATION ADULT - GENERAL OBSERVATIONS, REHAB EVAL
09:20-10:00 Chart reviewed, ok to treat by Occupational Therapist as confirmed by RN Erin, Pt received seated in bedside chair (+) IV (disconnected by the RN) (+) Prevena Right Hip in NAD. Pt in agreement with OT IE.

## 2024-02-28 NOTE — DISCHARGE NOTE PROVIDER - CARE PROVIDER_API CALL
Wali Garner  Orthopaedic Surgery  [address] [address]  Phone: [phone]  Fax: [phone]  Follow Up Time: 1 week

## 2024-02-28 NOTE — DISCHARGE NOTE NURSING/CASE MANAGEMENT/SOCIAL WORK - NSDCPEFALRISK_GEN_ALL_CORE
For information on Fall & Injury Prevention, visit: [email or web address] OR  [email or web address] OR  https://www.cdc.gov/steadi/patient.html

## 2024-02-28 NOTE — CONSULT NOTE ADULT - SUBJECTIVE AND OBJECTIVE BOX
EVELIO LLAMAS  73y, Male  Allergy: propofol (Other)      CHIEF COMPLAINT: Total Hip Arthroplasty  (28 Feb 2024 09:05)      HPI:    HPI:    FAMILY HISTORY:  FHx: cancer  father: Liver ca , Mother: lung ca, Brother: stomach+esophageal ca      PAST MEDICAL & SURGICAL HISTORY:  HTN (hypertension)      HLD (hyperlipidemia)      Pancreatic cancer  s/p whipple      AMY on CPAP      Mild CAD      Obesity with body mass index (BMI) of 30.0 to 39.9      Atrial fibrillation      S/P hernia repair      History of Whipple procedure      History of cardioversion          SOCIAL HISTORY  Social History:  Former smoker quit 30y/a: about 15-20 pack year hx  drinks alcohol socially  denies illicit drug use (03 Jun 2022 15:55)      Home Medications:  acetaminophen 325 mg oral tablet: 2 tab(s) orally every 6 hours (28 Feb 2024 08:59)  amLODIPine 10 mg oral tablet: 1 tab(s) orally once a day (27 Feb 2024 06:40)  atorvastatin 10 mg oral tablet: 1 tab(s) orally once a day (27 Feb 2024 06:40)  Centrum Men&#x27;s oral tablet: 1 tab(s) orally once a day (27 Feb 2024 06:40)  docusate sodium 100 mg oral capsule: 1 cap(s) orally once a day (27 Feb 2024 06:40)  ferrous sulfate 324 mg (65 mg elemental iron) oral delayed release tablet: 1 tab(s) orally once a day (27 Feb 2024 06:40)  Prevacid 15 mg oral delayed release capsule: 1 cap(s) orally once a day (27 Feb 2024 06:40)  senna (sennosides) 8.6 mg oral tablet: 1 tab(s) orally once a day (at bedtime) (27 Feb 2024 06:40)  valsartan-hydrochlorothiazide 320mg-25mg oral tablet: 1 tab(s) orally once a day (27 Feb 2024 06:40)  Zenpep 15,000 units-47,000 units-63,000 units oral delayed release capsule: 1 orally EVERY OTHER DAY (27 Feb 2024 06:40)      ROS  General: Denies fevers, chills, nightsweats, weight loss  HEENT: Denies headache, rhinorrhea, sore throat, eye pain  CV: Denies CP, palpitations  PULM: Denies SOB, cough  GI: Denies abdominal pain, diarrhea  : Denies dysuria, hematuria  MSK: Denies arthralgias  SKIN: Denies rash   NEURO: Denies paresthesias, weakness  PSYCH: Denies depression    VITALS:  T(F): 98.1, Max: 98.1 (02-28-24 @ 08:00)  HR: 76  BP: 110/60  RR: 18Vital Signs Last 24 Hrs  T(C): 36.7 (28 Feb 2024 08:00), Max: 36.7 (27 Feb 2024 11:48)  T(F): 98.1 (28 Feb 2024 08:00), Max: 98.1 (28 Feb 2024 08:00)  HR: 76 (28 Feb 2024 08:00) (65 - 77)  BP: 110/60 (28 Feb 2024 08:00) (97/56 - 132/69)  BP(mean): --  RR: 18 (28 Feb 2024 08:00) (18 - 20)  SpO2: 95% (28 Feb 2024 08:00) (95% - 98%)    Parameters below as of 28 Feb 2024 08:00  Patient On (Oxygen Delivery Method): room air        PHYSICAL EXAM:  Gen: NAD, resting in bed  HEENT: Normocephalic, atraumatic  Neck: supple, no lymphadenopathy  CV: Regular rate & regular rhythm  Lungs: CTABL no wheeze  Abdomen: Soft, NTND+ BS present  Ext: Warm, well perfused no CCE  Neuro: non focal, awake, CN II-XII intact   Skin: no rash, no erythema  Psych: no SI, HI, Hallucination     TESTS & MEASUREMENTS:                        11.5   15.77 )-----------( 131      ( 28 Feb 2024 06:26 )             33.1     02-28    137  |  101  |  21<H>  ----------------------------<  132<H>  3.9   |  26  |  1.1    Ca    8.4      28 Feb 2024 06:26          Urinalysis Basic - ( 28 Feb 2024 06:26 )    Color: x / Appearance: x / SG: x / pH: x  Gluc: 132 mg/dL / Ketone: x  / Bili: x / Urobili: x   Blood: x / Protein: x / Nitrite: x   Leuk Esterase: x / RBC: x / WBC x   Sq Epi: x / Non Sq Epi: x / Bacteria: x            QRS axis to [] ° and NSR at a rate of [] BPM. There was no atrial enlargement. There was no ventricular hypertrophy. There were no ST-T changes and all intervals were normal.      INFECTIOUS DISEASES TESTING  MRSA PCR Result.: Negative (02-13-24 @ 09:10)      RADIOLOGY & ADDITIONAL TESTS:  I have personally reviewed the last Chest xray  CXR      CT      CARDIOLOGY TESTING      MEDICATIONS  (STANDING):  acetaminophen     Tablet .. 650 milliGRAM(s) Oral every 6 hours  amLODIPine   Tablet 10 milliGRAM(s) Oral daily  apixaban 5 milliGRAM(s) Oral every 12 hours  atorvastatin 10 milliGRAM(s) Oral at bedtime  chlorhexidine 2% Cloths 1 Application(s) Topical daily  hydrochlorothiazide 25 milliGRAM(s) Oral daily  metoprolol tartrate 25 milliGRAM(s) Oral two times a day  pantoprazole    Tablet 40 milliGRAM(s) Oral before breakfast  polyethylene glycol 3350 17 Gram(s) Oral at bedtime  senna 2 Tablet(s) Oral at bedtime  valsartan 320 milliGRAM(s) Oral daily    MEDICATIONS  (PRN):  magnesium hydroxide Suspension 30 milliLiter(s) Oral daily PRN Constipation  ondansetron Injectable 4 milliGRAM(s) IV Push every 6 hours PRN Nausea and/or Vomiting  traMADol 50 milliGRAM(s) Oral every 4 hours PRN Mild Pain (1 - 3)      ANTIBIOTICS:      All available historical data has been reviewed    ASSESSMENT  73y M admitted with Primary osteoarthritis of right hip        PROBLEMS

## 2024-02-28 NOTE — CONSULT NOTE ADULT - CONSULT REQUESTED DATE/TIME
Patient mother notified via detailed voicemail left at cell number (ok per  HIPAA consent).     Recall in epic 28-Feb-2024 11:15

## 2024-02-28 NOTE — DISCHARGE NOTE PROVIDER - NSDCFUSCHEDAPPT_GEN_ALL_CORE_FT
Eugenia Holguin  [facility]  [facility] [address]  Scheduled Appointment: 04/03/2024

## 2024-03-01 LAB — SURGICAL PATHOLOGY STUDY: SIGNIFICANT CHANGE UP

## 2024-03-05 DIAGNOSIS — I48.20 CHRONIC ATRIAL FIBRILLATION, UNSPECIFIED: ICD-10-CM

## 2024-03-05 DIAGNOSIS — Z88.4 ALLERGY STATUS TO ANESTHETIC AGENT: ICD-10-CM

## 2024-03-05 DIAGNOSIS — I25.10 ATHEROSCLEROTIC HEART DISEASE OF NATIVE CORONARY ARTERY WITHOUT ANGINA PECTORIS: ICD-10-CM

## 2024-03-05 DIAGNOSIS — G47.33 OBSTRUCTIVE SLEEP APNEA (ADULT) (PEDIATRIC): ICD-10-CM

## 2024-03-05 DIAGNOSIS — I10 ESSENTIAL (PRIMARY) HYPERTENSION: ICD-10-CM

## 2024-03-05 DIAGNOSIS — E78.5 HYPERLIPIDEMIA, UNSPECIFIED: ICD-10-CM

## 2024-03-05 DIAGNOSIS — Z99.89 DEPENDENCE ON OTHER ENABLING MACHINES AND DEVICES: ICD-10-CM

## 2024-03-05 DIAGNOSIS — E66.9 OBESITY, UNSPECIFIED: ICD-10-CM

## 2024-03-05 DIAGNOSIS — Z85.07 PERSONAL HISTORY OF MALIGNANT NEOPLASM OF PANCREAS: ICD-10-CM

## 2024-03-05 DIAGNOSIS — M16.11 UNILATERAL PRIMARY OSTEOARTHRITIS, RIGHT HIP: ICD-10-CM

## 2024-03-21 ENCOUNTER — RESULT REVIEW (OUTPATIENT)
Age: 74
End: 2024-03-21

## 2024-03-21 ENCOUNTER — OUTPATIENT (OUTPATIENT)
Dept: OUTPATIENT SERVICES | Facility: HOSPITAL | Age: 74
LOS: 1 days | End: 2024-03-21
Payer: MEDICARE

## 2024-03-21 DIAGNOSIS — Z90.410 ACQUIRED TOTAL ABSENCE OF PANCREAS: Chronic | ICD-10-CM

## 2024-03-21 DIAGNOSIS — Z98.890 OTHER SPECIFIED POSTPROCEDURAL STATES: Chronic | ICD-10-CM

## 2024-03-21 DIAGNOSIS — Z92.89 PERSONAL HISTORY OF OTHER MEDICAL TREATMENT: Chronic | ICD-10-CM

## 2024-03-21 DIAGNOSIS — M25.551 PAIN IN RIGHT HIP: ICD-10-CM

## 2024-03-21 PROBLEM — I25.10 ATHEROSCLEROTIC HEART DISEASE OF NATIVE CORONARY ARTERY WITHOUT ANGINA PECTORIS: Chronic | Status: ACTIVE | Noted: 2024-02-13

## 2024-03-21 PROBLEM — I48.91 UNSPECIFIED ATRIAL FIBRILLATION: Chronic | Status: ACTIVE | Noted: 2024-02-21

## 2024-03-21 PROBLEM — E66.9 OBESITY, UNSPECIFIED: Chronic | Status: ACTIVE | Noted: 2024-02-21

## 2024-03-21 PROCEDURE — 73502 X-RAY EXAM HIP UNI 2-3 VIEWS: CPT | Mod: RT

## 2024-03-21 PROCEDURE — 73502 X-RAY EXAM HIP UNI 2-3 VIEWS: CPT | Mod: 26,RT

## 2024-03-22 DIAGNOSIS — M25.551 PAIN IN RIGHT HIP: ICD-10-CM

## 2024-04-02 NOTE — PHYSICAL EXAM
[Well Developed] : well developed [Well Nourished] : well nourished [No Acute Distress] : no acute distress [Obese] : obese [Normal Conjunctiva] : normal conjunctiva [Normal S1, S2] : normal S1, S2 [Normal Venous Pressure] : normal venous pressure [Clear Lung Fields] : clear lung fields [No Murmur] : no murmur [Good Air Entry] : good air entry [No Respiratory Distress] : no respiratory distress  [Soft] : abdomen soft [Normal Gait] : normal gait [No Rash] : no rash [No Edema] : no edema [Normal Speech] : normal speech [Moves all extremities] : moves all extremities [Alert and Oriented] : alert and oriented [Normal memory] : normal memory

## 2024-04-03 ENCOUNTER — APPOINTMENT (OUTPATIENT)
Dept: ELECTROPHYSIOLOGY | Facility: CLINIC | Age: 74
End: 2024-04-03
Payer: MEDICARE

## 2024-04-03 VITALS
TEMPERATURE: 98 F | DIASTOLIC BLOOD PRESSURE: 70 MMHG | HEIGHT: 70 IN | HEART RATE: 71 BPM | WEIGHT: 239 LBS | SYSTOLIC BLOOD PRESSURE: 102 MMHG | BODY MASS INDEX: 34.22 KG/M2

## 2024-04-03 DIAGNOSIS — I48.0 PAROXYSMAL ATRIAL FIBRILLATION: ICD-10-CM

## 2024-04-03 DIAGNOSIS — I10 ESSENTIAL (PRIMARY) HYPERTENSION: ICD-10-CM

## 2024-04-03 DIAGNOSIS — G47.33 OBSTRUCTIVE SLEEP APNEA (ADULT) (PEDIATRIC): ICD-10-CM

## 2024-04-03 PROCEDURE — 93000 ELECTROCARDIOGRAM COMPLETE: CPT

## 2024-04-03 PROCEDURE — 99215 OFFICE O/P EST HI 40 MIN: CPT | Mod: 25

## 2024-04-03 RX ORDER — ATORVASTATIN CALCIUM 10 MG/1
10 TABLET, FILM COATED ORAL DAILY
Qty: 90 | Refills: 3 | Status: ACTIVE | COMMUNITY
Start: 2021-01-08

## 2024-04-03 RX ORDER — FLUTICASONE PROPIONATE 50 UG/1
50 SPRAY, METERED NASAL
Qty: 16 | Refills: 0 | Status: ACTIVE | COMMUNITY
Start: 2021-04-07

## 2024-04-03 RX ORDER — VALSARTAN AND HYDROCHLOROTHIAZIDE 320; 12.5 MG/1; MG/1
320-12.5 TABLET, FILM COATED ORAL DAILY
Refills: 0 | Status: ACTIVE | COMMUNITY
Start: 2020-12-03

## 2024-04-03 RX ORDER — SENNOSIDES 8.6 MG TABLETS 8.6 MG/1
8.6 TABLET ORAL
Qty: 30 | Refills: 3 | Status: ACTIVE | COMMUNITY

## 2024-04-03 RX ORDER — APIXABAN 5 MG/1
5 TABLET, FILM COATED ORAL
Qty: 60 | Refills: 1 | Status: ACTIVE | COMMUNITY
Start: 2022-06-20

## 2024-04-03 RX ORDER — METOPROLOL TARTRATE 25 MG/1
25 TABLET, FILM COATED ORAL TWICE DAILY
Qty: 60 | Refills: 2 | Status: ACTIVE | COMMUNITY
Start: 2022-06-20

## 2024-04-03 RX ORDER — AMLODIPINE BESYLATE 10 MG/1
10 TABLET ORAL
Qty: 90 | Refills: 3 | Status: ACTIVE | COMMUNITY
Start: 2020-12-03

## 2024-04-03 NOTE — HISTORY OF PRESENT ILLNESS
[FreeTextEntry1] : Cardiologist: Dr. Frazier Pulmonologist: Dr. Brown  74 yo M w/ h/o AMY c/w CPAP, HTN, HL, non-obstructive CAD, pancreatic cancer s/p Whipple (2010), GERD, admitted 6/3-6/722 for new onset AF detected on his Apple watch. He was completely asymptomatic. Underwent successful MARI/CV 6/6/2022. Has not received any more notifications about AF from his watch. Also has [employer] mobile and has not had any more AF.   4/2024 Here for routine follow up. Had 2 days of AFib in June of last year that self-terminated. He had no symptoms while he was in AF but it was picked up on his watch. He had a hip surgery in Feb this year without any issues. He denies any cardiovascular complaints including chest pain, dyspnea, palpitations, dizziness, lightheadedness, presyncope or syncope. Of note, pt states he had two episodes of internal bleeding in 2013 and 2017 (AVM, bleeding ulcer).

## 2024-04-03 NOTE — ASSESSMENT
[FreeTextEntry1] : # Paroxysmal AFib s/p CV (6/2022) - Had another episode of AF in June 2023 (self-terminated) - Currently in NSR. Patient denies any symptoms when in AF.  - Cont Eliquis 5mg PO BID for CHADS VASc of at least 4 (HTN, Age 73, CAD). Two prior history of sig bleeding (2013, 2017).  - Pt wishes to defer ablation at this time. We did discuss FLORIAN occlusion devices, but he is tolerating the OAC for now so we will continue with Eliquis.   # HTN - BP normal - Cont Amlodipine 10 mg daily, Metoprolol Tartrate 25 mg BID, Valsartan-HCTZ 320/12.5 mg daily - 2g Na diet enforced  # AMY on CPAP - Cont with compliance  I have also advised the patient to go to the nearest emergency room if he experiences any chest pain, dyspnea, syncope, or has any other compelling symptoms.  Follow up in 6-9 mo or sooner if he has recurrence.

## 2024-04-03 NOTE — CARDIOLOGY SUMMARY
[de-identified] : 4/3/2024 NSR (HR 71 bpm), PVC 4/5/2023 NSR (HR 61 bpm) 7/6/2022 NSR (HR 63 bpm), PAC [de-identified] : 1/11/2023 Nuclear LVEF 69%. No infarct or ischemia.  [de-identified] : 6/4/2022   1. Normal global left ventricular systolic function.  2. Normal left atrial size.  3. Normal right atrial size.  4. No evidence of mitral valve regurgitation.  5. Thickening of the posterior mitral valve leaflet.

## 2024-04-03 NOTE — DISCUSSION/SUMMARY
[FreeTextEntry1] : We had an extensive conversation regarding the nature of atrial fibrillation, including potential etiologies, underlying pathophysiology and natural history of the disease. In addition, the potential risk of thromboembolic events and assessment of that risk were discussed. I have emphasized the importance of continuing anticoagulation.   In addition, the maintenance of sinus rhythm along with adjuvant antiarrhythmic agents and catheter ablation therapy were discussed. The rationale for and risks of ablation therapy were discussed, including but not limited to bleeding, vascular injury, groin complications, cardiac perforation and tamponade, stroke, esophageal injury, pulmonary vein stenosis, need for pacemaker, need for cardiac surgery, and death. In addition, the long-term and ongoing nature of this therapy were also discussed, including the critical role of continued monitoring post-ablation and the potential for the necessity of repeat ablation procedures to definitively treat the condition.   The patient verbalized understanding of the discussion and all questions were addressed and answered. The patient would like to DEFER ablation.   [EKG obtained to assist in diagnosis and management of assessed problem(s)] : EKG obtained to assist in diagnosis and management of assessed problem(s)

## 2024-04-08 ENCOUNTER — APPOINTMENT (OUTPATIENT)
Dept: ORTHOPEDIC SURGERY | Facility: CLINIC | Age: 74
End: 2024-04-08
Payer: MEDICARE

## 2024-04-08 VITALS — WEIGHT: 239 LBS | HEIGHT: 70 IN | BODY MASS INDEX: 34.22 KG/M2

## 2024-04-08 PROCEDURE — 99024 POSTOP FOLLOW-UP VISIT: CPT

## 2024-04-09 NOTE — HISTORY OF PRESENT ILLNESS
[0] : no pain reported [Chills] : no chills [Constipation] : no constipation [Diarrhea] : no diarrhea [Dysuria] : no dysuria [Fever] : no fever [Nausea] : no nausea [Vomiting] : no vomiting [TextEntry] : Mr. Manzo is a 73-year-old gentleman who presents for followup. He underwent right total hip arthroplasty on February 27 and is doing very well. He is very happy with the procedure and stated multiple times that he has absolutely no pain. He walks without a cane.  ROS Patient reports no fever, no significant weight gain, and no significant weight loss. He reports no muscle aches and no muscle weakness. He reports no weakness, no numbness, and no seizures.  Physical Exam On physical exam, he walks with no limp. Incision has healed well. There is no drainage, no erythema no, scabs left. Edema and ecchymoses have resolved.  Assessment / Plan All these findings were discussed with Mr. Manzo. We discussed multiple questions in regards to rehabilitation and return to driving. Patient had updated x-ray which demonstrated excellent position of all components on 3/21/24. Images were reviewed, discussed over the phone on 3/26/24. The patient will follow up in another 6 weeks.

## 2024-05-20 ENCOUNTER — APPOINTMENT (OUTPATIENT)
Dept: ORTHOPEDIC SURGERY | Facility: CLINIC | Age: 74
End: 2024-05-20
Payer: MEDICARE

## 2024-05-20 DIAGNOSIS — Z96.641 PRESENCE OF RIGHT ARTIFICIAL HIP JOINT: ICD-10-CM

## 2024-05-20 PROCEDURE — 73522 X-RAY EXAM HIPS BI 3-4 VIEWS: CPT

## 2024-05-20 PROCEDURE — 99024 POSTOP FOLLOW-UP VISIT: CPT

## 2024-05-20 RX ORDER — AMOXICILLIN 500 MG/1
500 CAPSULE ORAL
Qty: 4 | Refills: 2 | Status: ACTIVE | COMMUNITY
Start: 2024-05-20 | End: 1900-01-01

## 2024-05-23 NOTE — HISTORY OF PRESENT ILLNESS
[de-identified] : 73 year old s/p right total hip arthroplasty on February 27th 2024. [de-identified] : Mr. Manzo is doing very well. He is very happy with the procedure. He has no pain. He walks without a cane. [de-identified] : On physical exam, he walks with no limp. Incision has healed very well.  There is mild tenderness to palpation of the greater trochanter and distal to it over the lateral aspect of the thigh.  Range of motion is normal, and there is no pain with resisted straight leg raise. [de-identified] : X-ray demonstrates excellent position of all components. [de-identified] : Overall, the patient is doing very well and is very happy with the procedure.  He reports good return to activity. [de-identified] : Patient will follow-up in 2 months.

## 2024-07-24 ENCOUNTER — APPOINTMENT (OUTPATIENT)
Dept: PULMONOLOGY | Facility: CLINIC | Age: 74
End: 2024-07-24
Payer: MEDICARE

## 2024-07-24 VITALS
SYSTOLIC BLOOD PRESSURE: 138 MMHG | WEIGHT: 245 LBS | HEART RATE: 70 BPM | OXYGEN SATURATION: 97 % | BODY MASS INDEX: 35.07 KG/M2 | DIASTOLIC BLOOD PRESSURE: 80 MMHG | HEIGHT: 70 IN

## 2024-07-24 DIAGNOSIS — G47.33 OBSTRUCTIVE SLEEP APNEA (ADULT) (PEDIATRIC): ICD-10-CM

## 2024-07-24 PROCEDURE — 99213 OFFICE O/P EST LOW 20 MIN: CPT

## 2024-07-24 NOTE — HISTORY OF PRESENT ILLNESS
[TextBox_4] : been using the cpap machine every night and getting benefit compliance report reviewed excellent compliance  average AHI < 2.5  no cough wheeze or sob

## 2024-08-21 ENCOUNTER — APPOINTMENT (OUTPATIENT)
Dept: ORTHOPEDIC SURGERY | Facility: CLINIC | Age: 74
End: 2024-08-21
Payer: MEDICARE

## 2024-08-21 DIAGNOSIS — Z96.652 PRESENCE OF LEFT ARTIFICIAL KNEE JOINT: ICD-10-CM

## 2024-08-21 DIAGNOSIS — Z96.641 PRESENCE OF RIGHT ARTIFICIAL HIP JOINT: ICD-10-CM

## 2024-08-21 PROCEDURE — 73522 X-RAY EXAM HIPS BI 3-4 VIEWS: CPT

## 2024-08-21 PROCEDURE — 73562 X-RAY EXAM OF KNEE 3: CPT | Mod: LT

## 2024-08-21 PROCEDURE — 99213 OFFICE O/P EST LOW 20 MIN: CPT

## 2024-08-21 NOTE — HISTORY OF PRESENT ILLNESS
[de-identified] : 73-year-old s/p right total hip arthroplasty on February 27th, 2024. Mr. Manzo is doing very well. He is very happy with the procedure. He has no pain. He walks without a cane.  He recently decreased his walking from 3 to 4 miles a day to about 1/2 mile a day due to right ankle pain.  He prefers to bike for exercise.  He also reports intermittent left knee pain.  There is no pain in the left hip.

## 2024-08-21 NOTE — PHYSICAL EXAM
[de-identified] : On physical exam, he walks with no limp. Incision has healed very well.  Limb discrepancy is about 4 to 5 mm with the right lower extremity being longer.  Range of motion is normal, and there is no pain with resisted straight leg raise. Left hip demonstrates normal range of motion without pain. Left knee demonstrates a well-healed scar from total knee arthroplasty.  There is no significant tenderness to palpation.  There is good range of motion with appropriate stability throughout. [de-identified] : bilateral hip x-rays taken today show: Right hip: excellent position of all components.  No evidence of fracture or subsidence. Left knee: Appropriate position of all components and cementation technique.

## 2024-12-01 ENCOUNTER — NON-APPOINTMENT (OUTPATIENT)
Age: 74
End: 2024-12-01

## 2024-12-02 ENCOUNTER — NON-APPOINTMENT (OUTPATIENT)
Age: 74
End: 2024-12-02

## 2024-12-04 ENCOUNTER — APPOINTMENT (OUTPATIENT)
Dept: ELECTROPHYSIOLOGY | Facility: CLINIC | Age: 74
End: 2024-12-04
Payer: MEDICARE

## 2024-12-04 VITALS
SYSTOLIC BLOOD PRESSURE: 128 MMHG | BODY MASS INDEX: 35.36 KG/M2 | HEIGHT: 70 IN | DIASTOLIC BLOOD PRESSURE: 70 MMHG | WEIGHT: 247 LBS

## 2024-12-04 DIAGNOSIS — I10 ESSENTIAL (PRIMARY) HYPERTENSION: ICD-10-CM

## 2024-12-04 DIAGNOSIS — I48.0 PAROXYSMAL ATRIAL FIBRILLATION: ICD-10-CM

## 2024-12-04 DIAGNOSIS — G47.33 OBSTRUCTIVE SLEEP APNEA (ADULT) (PEDIATRIC): ICD-10-CM

## 2024-12-04 PROCEDURE — 93000 ELECTROCARDIOGRAM COMPLETE: CPT

## 2024-12-04 PROCEDURE — 99215 OFFICE O/P EST HI 40 MIN: CPT

## 2024-12-04 PROCEDURE — G2211 COMPLEX E/M VISIT ADD ON: CPT

## 2024-12-04 RX ORDER — MAGNESIUM OXIDE/MAG AA CHELATE 300 MG
CAPSULE ORAL DAILY
Refills: 0 | Status: ACTIVE | COMMUNITY

## 2024-12-05 ENCOUNTER — NON-APPOINTMENT (OUTPATIENT)
Age: 74
End: 2024-12-05

## 2024-12-06 ENCOUNTER — NON-APPOINTMENT (OUTPATIENT)
Age: 74
End: 2024-12-06

## 2024-12-18 ENCOUNTER — NON-APPOINTMENT (OUTPATIENT)
Age: 74
End: 2024-12-18

## 2025-02-10 ENCOUNTER — APPOINTMENT (OUTPATIENT)
Dept: ORTHOPEDIC SURGERY | Facility: CLINIC | Age: 75
End: 2025-02-10
Payer: MEDICARE

## 2025-02-10 DIAGNOSIS — Z96.652 PRESENCE OF LEFT ARTIFICIAL KNEE JOINT: ICD-10-CM

## 2025-02-10 DIAGNOSIS — Z96.641 PRESENCE OF RIGHT ARTIFICIAL HIP JOINT: ICD-10-CM

## 2025-02-10 PROCEDURE — 73522 X-RAY EXAM HIPS BI 3-4 VIEWS: CPT

## 2025-02-10 PROCEDURE — 99213 OFFICE O/P EST LOW 20 MIN: CPT

## 2025-02-10 PROCEDURE — 73564 X-RAY EXAM KNEE 4 OR MORE: CPT | Mod: 50

## 2025-03-18 ENCOUNTER — APPOINTMENT (OUTPATIENT)
Dept: PULMONOLOGY | Facility: CLINIC | Age: 75
End: 2025-03-18
Payer: MEDICARE

## 2025-03-18 VITALS
HEIGHT: 70 IN | BODY MASS INDEX: 35.22 KG/M2 | SYSTOLIC BLOOD PRESSURE: 124 MMHG | HEART RATE: 72 BPM | DIASTOLIC BLOOD PRESSURE: 80 MMHG | WEIGHT: 246 LBS | OXYGEN SATURATION: 98 %

## 2025-03-18 DIAGNOSIS — G47.33 OBSTRUCTIVE SLEEP APNEA (ADULT) (PEDIATRIC): ICD-10-CM

## 2025-03-18 DIAGNOSIS — R91.1 SOLITARY PULMONARY NODULE: ICD-10-CM

## 2025-03-18 PROCEDURE — G2211 COMPLEX E/M VISIT ADD ON: CPT

## 2025-03-18 PROCEDURE — 99214 OFFICE O/P EST MOD 30 MIN: CPT

## 2025-03-24 ENCOUNTER — OUTPATIENT (OUTPATIENT)
Dept: OUTPATIENT SERVICES | Facility: HOSPITAL | Age: 75
LOS: 1 days | End: 2025-03-24
Payer: MEDICARE

## 2025-03-24 ENCOUNTER — APPOINTMENT (OUTPATIENT)
Dept: PULMONOLOGY | Facility: HOSPITAL | Age: 75
End: 2025-03-24
Payer: MEDICARE

## 2025-03-24 DIAGNOSIS — Z92.89 PERSONAL HISTORY OF OTHER MEDICAL TREATMENT: Chronic | ICD-10-CM

## 2025-03-24 DIAGNOSIS — Z98.890 OTHER SPECIFIED POSTPROCEDURAL STATES: Chronic | ICD-10-CM

## 2025-03-24 DIAGNOSIS — Z90.410 ACQUIRED TOTAL ABSENCE OF PANCREAS: Chronic | ICD-10-CM

## 2025-03-24 DIAGNOSIS — R06.02 SHORTNESS OF BREATH: ICD-10-CM

## 2025-03-24 PROCEDURE — 94664 DEMO&/EVAL PT USE INHALER: CPT

## 2025-03-24 PROCEDURE — 94060 EVALUATION OF WHEEZING: CPT | Mod: 26

## 2025-03-24 PROCEDURE — 94729 DIFFUSING CAPACITY: CPT | Mod: 26

## 2025-03-24 PROCEDURE — 94727 GAS DIL/WSHOT DETER LNG VOL: CPT

## 2025-03-24 PROCEDURE — 94727 GAS DIL/WSHOT DETER LNG VOL: CPT | Mod: 26

## 2025-03-24 PROCEDURE — 94070 EVALUATION OF WHEEZING: CPT

## 2025-03-24 PROCEDURE — 94729 DIFFUSING CAPACITY: CPT

## 2025-03-25 DIAGNOSIS — R06.02 SHORTNESS OF BREATH: ICD-10-CM

## 2025-05-01 ENCOUNTER — NON-APPOINTMENT (OUTPATIENT)
Age: 75
End: 2025-05-01

## 2025-08-02 ENCOUNTER — NON-APPOINTMENT (OUTPATIENT)
Age: 75
End: 2025-08-02

## 2025-08-04 ENCOUNTER — APPOINTMENT (OUTPATIENT)
Dept: PULMONOLOGY | Facility: CLINIC | Age: 75
End: 2025-08-04
Payer: MEDICARE

## 2025-08-04 VITALS
WEIGHT: 245 LBS | BODY MASS INDEX: 35.07 KG/M2 | DIASTOLIC BLOOD PRESSURE: 78 MMHG | SYSTOLIC BLOOD PRESSURE: 141 MMHG | HEIGHT: 70 IN | HEART RATE: 49 BPM

## 2025-08-04 DIAGNOSIS — R91.1 SOLITARY PULMONARY NODULE: ICD-10-CM

## 2025-08-04 DIAGNOSIS — G47.33 OBSTRUCTIVE SLEEP APNEA (ADULT) (PEDIATRIC): ICD-10-CM

## 2025-08-04 PROCEDURE — G2211 COMPLEX E/M VISIT ADD ON: CPT

## 2025-08-04 PROCEDURE — 99214 OFFICE O/P EST MOD 30 MIN: CPT

## 2025-08-13 ENCOUNTER — APPOINTMENT (OUTPATIENT)
Dept: ORTHOPEDIC SURGERY | Facility: CLINIC | Age: 75
End: 2025-08-13
Payer: MEDICARE

## 2025-08-13 DIAGNOSIS — Z96.641 PRESENCE OF RIGHT ARTIFICIAL HIP JOINT: ICD-10-CM

## 2025-08-13 PROCEDURE — 99213 OFFICE O/P EST LOW 20 MIN: CPT

## 2025-09-03 ENCOUNTER — APPOINTMENT (OUTPATIENT)
Dept: ELECTROPHYSIOLOGY | Facility: CLINIC | Age: 75
End: 2025-09-03
Payer: MEDICARE

## 2025-09-03 VITALS
HEIGHT: 70 IN | SYSTOLIC BLOOD PRESSURE: 136 MMHG | DIASTOLIC BLOOD PRESSURE: 84 MMHG | WEIGHT: 247 LBS | BODY MASS INDEX: 35.36 KG/M2 | HEART RATE: 64 BPM

## 2025-09-03 DIAGNOSIS — I48.0 PAROXYSMAL ATRIAL FIBRILLATION: ICD-10-CM

## 2025-09-03 DIAGNOSIS — I10 ESSENTIAL (PRIMARY) HYPERTENSION: ICD-10-CM

## 2025-09-03 PROCEDURE — 99204 OFFICE O/P NEW MOD 45 MIN: CPT

## 2025-09-03 PROCEDURE — 93000 ELECTROCARDIOGRAM COMPLETE: CPT

## 2025-09-03 PROCEDURE — G2211 COMPLEX E/M VISIT ADD ON: CPT

## 2025-09-03 RX ORDER — DABIGATRAN ETEXILATE 150 MG/1
CAPSULE ORAL TWICE DAILY
Refills: 0 | Status: ACTIVE | COMMUNITY